# Patient Record
Sex: FEMALE | Race: OTHER | HISPANIC OR LATINO | URBAN - METROPOLITAN AREA
[De-identification: names, ages, dates, MRNs, and addresses within clinical notes are randomized per-mention and may not be internally consistent; named-entity substitution may affect disease eponyms.]

---

## 2017-05-05 ENCOUNTER — OUTPATIENT (OUTPATIENT)
Dept: OUTPATIENT SERVICES | Facility: HOSPITAL | Age: 37
LOS: 1 days | End: 2017-05-05
Payer: SELF-PAY

## 2017-05-05 ENCOUNTER — APPOINTMENT (OUTPATIENT)
Dept: UROLOGY | Facility: CLINIC | Age: 37
End: 2017-05-05

## 2017-05-05 DIAGNOSIS — R35.0 FREQUENCY OF MICTURITION: ICD-10-CM

## 2017-05-05 PROCEDURE — G0463: CPT

## 2017-05-09 DIAGNOSIS — N13.30 UNSPECIFIED HYDRONEPHROSIS: ICD-10-CM

## 2017-06-12 ENCOUNTER — OUTPATIENT (OUTPATIENT)
Dept: OUTPATIENT SERVICES | Facility: HOSPITAL | Age: 37
LOS: 1 days | End: 2017-06-12

## 2017-06-12 VITALS
WEIGHT: 169.98 LBS | HEIGHT: 63 IN | RESPIRATION RATE: 16 BRPM | TEMPERATURE: 97 F | SYSTOLIC BLOOD PRESSURE: 108 MMHG | HEART RATE: 82 BPM | DIASTOLIC BLOOD PRESSURE: 72 MMHG | OXYGEN SATURATION: 99 %

## 2017-06-12 DIAGNOSIS — N13.30 UNSPECIFIED HYDRONEPHROSIS: ICD-10-CM

## 2017-06-12 LAB
APPEARANCE UR: CLEAR — SIGNIFICANT CHANGE UP
BILIRUB UR-MCNC: NEGATIVE — SIGNIFICANT CHANGE UP
BLD GP AB SCN SERPL QL: NEGATIVE — SIGNIFICANT CHANGE UP
BLOOD UR QL VISUAL: NEGATIVE — SIGNIFICANT CHANGE UP
BUN SERPL-MCNC: 14 MG/DL — SIGNIFICANT CHANGE UP (ref 7–23)
CALCIUM SERPL-MCNC: 9 MG/DL — SIGNIFICANT CHANGE UP (ref 8.4–10.5)
CHLORIDE SERPL-SCNC: 104 MMOL/L — SIGNIFICANT CHANGE UP (ref 98–107)
CO2 SERPL-SCNC: 27 MMOL/L — SIGNIFICANT CHANGE UP (ref 22–31)
COLOR SPEC: SIGNIFICANT CHANGE UP
CREAT SERPL-MCNC: 0.61 MG/DL — SIGNIFICANT CHANGE UP (ref 0.5–1.3)
GLUCOSE SERPL-MCNC: 86 MG/DL — SIGNIFICANT CHANGE UP (ref 70–99)
GLUCOSE UR-MCNC: NEGATIVE — SIGNIFICANT CHANGE UP
HCT VFR BLD CALC: 37.9 % — SIGNIFICANT CHANGE UP (ref 34.5–45)
HGB BLD-MCNC: 12 G/DL — SIGNIFICANT CHANGE UP (ref 11.5–15.5)
KETONES UR-MCNC: NEGATIVE — SIGNIFICANT CHANGE UP
LEUKOCYTE ESTERASE UR-ACNC: NEGATIVE — SIGNIFICANT CHANGE UP
MCHC RBC-ENTMCNC: 29.3 PG — SIGNIFICANT CHANGE UP (ref 27–34)
MCHC RBC-ENTMCNC: 31.7 % — LOW (ref 32–36)
MCV RBC AUTO: 92.4 FL — SIGNIFICANT CHANGE UP (ref 80–100)
MUCOUS THREADS # UR AUTO: SIGNIFICANT CHANGE UP
NITRITE UR-MCNC: NEGATIVE — SIGNIFICANT CHANGE UP
PH UR: 6.5 — SIGNIFICANT CHANGE UP (ref 4.6–8)
PLATELET # BLD AUTO: 262 K/UL — SIGNIFICANT CHANGE UP (ref 150–400)
PMV BLD: 9.9 FL — SIGNIFICANT CHANGE UP (ref 7–13)
POTASSIUM SERPL-MCNC: 4.1 MMOL/L — SIGNIFICANT CHANGE UP (ref 3.5–5.3)
POTASSIUM SERPL-SCNC: 4.1 MMOL/L — SIGNIFICANT CHANGE UP (ref 3.5–5.3)
PROT UR-MCNC: NEGATIVE — SIGNIFICANT CHANGE UP
RBC # BLD: 4.1 M/UL — SIGNIFICANT CHANGE UP (ref 3.8–5.2)
RBC # FLD: 13.6 % — SIGNIFICANT CHANGE UP (ref 10.3–14.5)
RBC CASTS # UR COMP ASSIST: SIGNIFICANT CHANGE UP (ref 0–?)
RH IG SCN BLD-IMP: POSITIVE — SIGNIFICANT CHANGE UP
SODIUM SERPL-SCNC: 142 MMOL/L — SIGNIFICANT CHANGE UP (ref 135–145)
SP GR SPEC: 1.01 — SIGNIFICANT CHANGE UP (ref 1–1.03)
SQUAMOUS # UR AUTO: SIGNIFICANT CHANGE UP
UROBILINOGEN FLD QL: NORMAL E.U. — SIGNIFICANT CHANGE UP (ref 0.1–0.2)
WBC # BLD: 5.49 K/UL — SIGNIFICANT CHANGE UP (ref 3.8–10.5)
WBC # FLD AUTO: 5.49 K/UL — SIGNIFICANT CHANGE UP (ref 3.8–10.5)
WBC UR QL: SIGNIFICANT CHANGE UP (ref 0–?)

## 2017-06-12 RX ORDER — IBUPROFEN 200 MG
0 TABLET ORAL
Qty: 0 | Refills: 0 | COMMUNITY

## 2017-06-12 RX ORDER — SODIUM CHLORIDE 9 MG/ML
1000 INJECTION, SOLUTION INTRAVENOUS
Qty: 0 | Refills: 0 | Status: DISCONTINUED | OUTPATIENT
Start: 2017-06-13 | End: 2017-06-13

## 2017-06-12 NOTE — H&P PST ADULT - NSANTHOSAYNRD_GEN_A_CORE
No. SUSI screening performed.  STOP BANG Legend: 0-2 = LOW Risk; 3-4 = INTERMEDIATE Risk; 5-8 = HIGH Risk

## 2017-06-12 NOTE — H&P PST ADULT - HISTORY OF PRESENT ILLNESS
Pt. is a 38 yo female with left flank pain. Pt. went to the ER.  Diagnostic testing revealed left hydronephrosis.

## 2017-06-12 NOTE — H&P PST ADULT - NS NEC GEN PE MLT EXAM PC
Metabolic encephalopathy Type 2 diabetes mellitus without complication, without long-term current use of insulin Type 2 diabetes mellitus without complication, without long-term current use of insulin Type 2 diabetes mellitus without complication, without long-term current use of insulin Type 2 diabetes mellitus without complication, without long-term current use of insulin Metabolic encephalopathy Type 2 diabetes mellitus without complication, without long-term current use of insulin No bruits; no thyromegaly or nodules

## 2017-06-12 NOTE — H&P PST ADULT - REASON FOR ADMISSION
"I am having problems with my left kidney, the reason is obstruction, the tube that connects the kidney to the bladder, there is an obstruction there"

## 2017-06-12 NOTE — H&P PST ADULT - MALLAMPATI CLASS
on phonation/Class I (easy) - visualization of the soft palate, fauces, uvula, and both anterior and posterior pillars

## 2017-06-12 NOTE — ASU PATIENT PROFILE, ADULT - PATIENT KNOW
Called patient to let him know Dr. Saravia would like to repeat the procedure in 8 weeks.    He will be contacted by the schedulers to set up procedure.    Schedule Procedure:     Please Schedule 8-10 weeks  Procedure: EGD (54369) with EMR  Diagnosis: Smith's Esophagus K22.70  Is patient:    Diabetic? No   On Coumadin? No   On ASA/NSAIDS? Yes: aspirin  Prophylactic Antibiotics? No  Location: [facility]  Special Instructions:   MAC Anesthesia    With Dr. Saravia                yes

## 2017-06-13 ENCOUNTER — INPATIENT (INPATIENT)
Facility: HOSPITAL | Age: 37
LOS: 1 days | Discharge: HOME | End: 2017-06-15
Attending: UROLOGY | Admitting: UROLOGY
Payer: SELF-PAY

## 2017-06-13 ENCOUNTER — RESULT REVIEW (OUTPATIENT)
Age: 37
End: 2017-06-13

## 2017-06-13 ENCOUNTER — APPOINTMENT (OUTPATIENT)
Dept: UROLOGY | Facility: HOSPITAL | Age: 37
End: 2017-06-13

## 2017-06-13 VITALS
HEIGHT: 63 IN | SYSTOLIC BLOOD PRESSURE: 110 MMHG | HEART RATE: 69 BPM | RESPIRATION RATE: 16 BRPM | DIASTOLIC BLOOD PRESSURE: 61 MMHG | TEMPERATURE: 98 F | WEIGHT: 169.98 LBS | OXYGEN SATURATION: 100 %

## 2017-06-13 DIAGNOSIS — N13.30 UNSPECIFIED HYDRONEPHROSIS: ICD-10-CM

## 2017-06-13 LAB
HCG UR QL: NEGATIVE — SIGNIFICANT CHANGE UP
RH IG SCN BLD-IMP: POSITIVE — SIGNIFICANT CHANGE UP
SPECIMEN SOURCE: SIGNIFICANT CHANGE UP

## 2017-06-13 PROCEDURE — 88304 TISSUE EXAM BY PATHOLOGIST: CPT | Mod: 26

## 2017-06-13 PROCEDURE — 50544 LAPAROSCOPY PYELOPLASTY: CPT | Mod: LT

## 2017-06-13 RX ORDER — HEPARIN SODIUM 5000 [USP'U]/ML
5000 INJECTION INTRAVENOUS; SUBCUTANEOUS EVERY 8 HOURS
Qty: 0 | Refills: 0 | Status: DISCONTINUED | OUTPATIENT
Start: 2017-06-13 | End: 2017-06-15

## 2017-06-13 RX ORDER — SENNA PLUS 8.6 MG/1
2 TABLET ORAL AT BEDTIME
Qty: 0 | Refills: 0 | Status: DISCONTINUED | OUTPATIENT
Start: 2017-06-13 | End: 2017-06-15

## 2017-06-13 RX ORDER — CEFAZOLIN SODIUM 1 G
1000 VIAL (EA) INJECTION EVERY 8 HOURS
Qty: 0 | Refills: 0 | Status: COMPLETED | OUTPATIENT
Start: 2017-06-13 | End: 2017-06-14

## 2017-06-13 RX ORDER — ACETAMINOPHEN 500 MG
650 TABLET ORAL EVERY 6 HOURS
Qty: 0 | Refills: 0 | Status: DISCONTINUED | OUTPATIENT
Start: 2017-06-13 | End: 2017-06-15

## 2017-06-13 RX ORDER — SODIUM CHLORIDE 9 MG/ML
1000 INJECTION, SOLUTION INTRAVENOUS
Qty: 0 | Refills: 0 | Status: DISCONTINUED | OUTPATIENT
Start: 2017-06-13 | End: 2017-06-14

## 2017-06-13 RX ORDER — HYDROMORPHONE HYDROCHLORIDE 2 MG/ML
1 INJECTION INTRAMUSCULAR; INTRAVENOUS; SUBCUTANEOUS EVERY 4 HOURS
Qty: 0 | Refills: 0 | Status: DISCONTINUED | OUTPATIENT
Start: 2017-06-13 | End: 2017-06-14

## 2017-06-13 RX ORDER — DOCUSATE SODIUM 100 MG
100 CAPSULE ORAL THREE TIMES A DAY
Qty: 0 | Refills: 0 | Status: DISCONTINUED | OUTPATIENT
Start: 2017-06-13 | End: 2017-06-15

## 2017-06-13 RX ADMIN — HEPARIN SODIUM 5000 UNIT(S): 5000 INJECTION INTRAVENOUS; SUBCUTANEOUS at 21:42

## 2017-06-13 RX ADMIN — SODIUM CHLORIDE 125 MILLILITER(S): 9 INJECTION, SOLUTION INTRAVENOUS at 13:53

## 2017-06-13 RX ADMIN — HEPARIN SODIUM 5000 UNIT(S): 5000 INJECTION INTRAVENOUS; SUBCUTANEOUS at 14:00

## 2017-06-13 RX ADMIN — Medication 100 MILLIGRAM(S): at 20:26

## 2017-06-13 RX ADMIN — SENNA PLUS 2 TABLET(S): 8.6 TABLET ORAL at 21:41

## 2017-06-13 RX ADMIN — Medication 100 MILLIGRAM(S): at 21:42

## 2017-06-13 NOTE — PROGRESS NOTE ADULT - SUBJECTIVE AND OBJECTIVE BOX
Post op check    This   38yo F  is s/p  Piero. BRE lap pyrloplasty    PMH:  none  Pt is awake and alert  Has no c/o  Afeb 108/58  73  99%RA    Abd- soft; appropriately tender             wounds C&D  Flores 200  RHONDA 0

## 2017-06-14 ENCOUNTER — TRANSCRIPTION ENCOUNTER (OUTPATIENT)
Age: 37
End: 2017-06-14

## 2017-06-14 LAB
BACTERIA UR CULT: SIGNIFICANT CHANGE UP
BUN SERPL-MCNC: 8 MG/DL — SIGNIFICANT CHANGE UP (ref 7–23)
CALCIUM SERPL-MCNC: 8 MG/DL — LOW (ref 8.4–10.5)
CHLORIDE SERPL-SCNC: 104 MMOL/L — SIGNIFICANT CHANGE UP (ref 98–107)
CO2 SERPL-SCNC: 23 MMOL/L — SIGNIFICANT CHANGE UP (ref 22–31)
CREAT SERPL-MCNC: 0.58 MG/DL — SIGNIFICANT CHANGE UP (ref 0.5–1.3)
GLUCOSE SERPL-MCNC: 91 MG/DL — SIGNIFICANT CHANGE UP (ref 70–99)
HCT VFR BLD CALC: 31.2 % — LOW (ref 34.5–45)
HGB BLD-MCNC: 9.9 G/DL — LOW (ref 11.5–15.5)
MCHC RBC-ENTMCNC: 29.8 PG — SIGNIFICANT CHANGE UP (ref 27–34)
MCHC RBC-ENTMCNC: 31.7 % — LOW (ref 32–36)
MCV RBC AUTO: 94 FL — SIGNIFICANT CHANGE UP (ref 80–100)
PLATELET # BLD AUTO: 189 K/UL — SIGNIFICANT CHANGE UP (ref 150–400)
PMV BLD: 9.7 FL — SIGNIFICANT CHANGE UP (ref 7–13)
POTASSIUM SERPL-MCNC: 3.8 MMOL/L — SIGNIFICANT CHANGE UP (ref 3.5–5.3)
POTASSIUM SERPL-SCNC: 3.8 MMOL/L — SIGNIFICANT CHANGE UP (ref 3.5–5.3)
RBC # BLD: 3.32 M/UL — LOW (ref 3.8–5.2)
RBC # FLD: 14.1 % — SIGNIFICANT CHANGE UP (ref 10.3–14.5)
SODIUM SERPL-SCNC: 139 MMOL/L — SIGNIFICANT CHANGE UP (ref 135–145)
WBC # BLD: 6.38 K/UL — SIGNIFICANT CHANGE UP (ref 3.8–10.5)
WBC # FLD AUTO: 6.38 K/UL — SIGNIFICANT CHANGE UP (ref 3.8–10.5)

## 2017-06-14 RX ORDER — SODIUM CHLORIDE 9 MG/ML
1000 INJECTION, SOLUTION INTRAVENOUS
Qty: 0 | Refills: 0 | Status: DISCONTINUED | OUTPATIENT
Start: 2017-06-14 | End: 2017-06-15

## 2017-06-14 RX ORDER — HYDROMORPHONE HYDROCHLORIDE 2 MG/ML
0.5 INJECTION INTRAMUSCULAR; INTRAVENOUS; SUBCUTANEOUS ONCE
Qty: 0 | Refills: 0 | Status: DISCONTINUED | OUTPATIENT
Start: 2017-06-14 | End: 2017-06-14

## 2017-06-14 RX ORDER — HYDROMORPHONE HYDROCHLORIDE 2 MG/ML
1 INJECTION INTRAMUSCULAR; INTRAVENOUS; SUBCUTANEOUS EVERY 4 HOURS
Qty: 0 | Refills: 0 | Status: DISCONTINUED | OUTPATIENT
Start: 2017-06-14 | End: 2017-06-15

## 2017-06-14 RX ORDER — HYDROMORPHONE HYDROCHLORIDE 2 MG/ML
0.5 INJECTION INTRAMUSCULAR; INTRAVENOUS; SUBCUTANEOUS
Qty: 0 | Refills: 0 | Status: DISCONTINUED | OUTPATIENT
Start: 2017-06-14 | End: 2017-06-15

## 2017-06-14 RX ADMIN — Medication 100 MILLIGRAM(S): at 14:46

## 2017-06-14 RX ADMIN — SENNA PLUS 2 TABLET(S): 8.6 TABLET ORAL at 21:46

## 2017-06-14 RX ADMIN — HYDROMORPHONE HYDROCHLORIDE 1 MILLIGRAM(S): 2 INJECTION INTRAMUSCULAR; INTRAVENOUS; SUBCUTANEOUS at 15:44

## 2017-06-14 RX ADMIN — Medication 100 MILLIGRAM(S): at 05:35

## 2017-06-14 RX ADMIN — HYDROMORPHONE HYDROCHLORIDE 1 MILLIGRAM(S): 2 INJECTION INTRAMUSCULAR; INTRAVENOUS; SUBCUTANEOUS at 20:46

## 2017-06-14 RX ADMIN — HEPARIN SODIUM 5000 UNIT(S): 5000 INJECTION INTRAVENOUS; SUBCUTANEOUS at 05:35

## 2017-06-14 RX ADMIN — HYDROMORPHONE HYDROCHLORIDE 0.5 MILLIGRAM(S): 2 INJECTION INTRAMUSCULAR; INTRAVENOUS; SUBCUTANEOUS at 10:57

## 2017-06-14 RX ADMIN — HEPARIN SODIUM 5000 UNIT(S): 5000 INJECTION INTRAVENOUS; SUBCUTANEOUS at 21:46

## 2017-06-14 RX ADMIN — Medication 100 MILLIGRAM(S): at 03:53

## 2017-06-14 RX ADMIN — Medication 10 MILLIGRAM(S): at 21:47

## 2017-06-14 RX ADMIN — HYDROMORPHONE HYDROCHLORIDE 1 MILLIGRAM(S): 2 INJECTION INTRAMUSCULAR; INTRAVENOUS; SUBCUTANEOUS at 20:31

## 2017-06-14 RX ADMIN — HEPARIN SODIUM 5000 UNIT(S): 5000 INJECTION INTRAVENOUS; SUBCUTANEOUS at 14:46

## 2017-06-14 RX ADMIN — Medication 10 MILLIGRAM(S): at 14:46

## 2017-06-14 RX ADMIN — HYDROMORPHONE HYDROCHLORIDE 0.5 MILLIGRAM(S): 2 INJECTION INTRAMUSCULAR; INTRAVENOUS; SUBCUTANEOUS at 10:42

## 2017-06-14 RX ADMIN — HYDROMORPHONE HYDROCHLORIDE 1 MILLIGRAM(S): 2 INJECTION INTRAMUSCULAR; INTRAVENOUS; SUBCUTANEOUS at 15:59

## 2017-06-14 RX ADMIN — Medication 100 MILLIGRAM(S): at 12:00

## 2017-06-14 RX ADMIN — Medication 100 MILLIGRAM(S): at 21:46

## 2017-06-14 NOTE — PROGRESS NOTE ADULT - SUBJECTIVE AND OBJECTIVE BOX
POD #1    Afeb 96/54  76 100%RA    Pt has no c/o    Abd- soft NT ND; no flatus          wounds C&D    Mark 1400  RHONDA 60

## 2017-06-14 NOTE — PROGRESS NOTE ADULT - SUBJECTIVE AND OBJECTIVE BOX
ANESTHESIA POSTOP CHECK    37y Female POSTOP DAY 1 S/P cystoscopy    Vital Signs Last 24 Hrs  T(C): 37.4, Max: 37.4 (06-14 @ 10:36)  T(F): 99.3, Max: 99.3 (06-14 @ 10:36)  HR: 78 (70 - 88)  BP: 104/60 (92/50 - 115/66)  BP(mean): --  RR: 18 (16 - 23)  SpO2: 99% (97% - 100%)  I&O's Summary  I & Os for 24h ending 14 Jun 2017 07:00  =============================================  IN: 425 ml / OUT: 2424 ml / NET: -1999 ml    I & Os for current day (as of 14 Jun 2017 11:50)  =============================================  IN: 0 ml / OUT: 610 ml / NET: -610 ml      [x ] NO APPARENT ANESTHESIA COMPLICATIONS      Comments:

## 2017-06-15 VITALS
HEART RATE: 85 BPM | RESPIRATION RATE: 16 BRPM | OXYGEN SATURATION: 99 % | DIASTOLIC BLOOD PRESSURE: 71 MMHG | SYSTOLIC BLOOD PRESSURE: 112 MMHG | TEMPERATURE: 99 F

## 2017-06-15 LAB
BUN SERPL-MCNC: 5 MG/DL — LOW (ref 7–23)
CALCIUM SERPL-MCNC: 8.4 MG/DL — SIGNIFICANT CHANGE UP (ref 8.4–10.5)
CHLORIDE SERPL-SCNC: 105 MMOL/L — SIGNIFICANT CHANGE UP (ref 98–107)
CO2 SERPL-SCNC: 26 MMOL/L — SIGNIFICANT CHANGE UP (ref 22–31)
CREAT SERPL-MCNC: 0.6 MG/DL — SIGNIFICANT CHANGE UP (ref 0.5–1.3)
GLUCOSE SERPL-MCNC: 111 MG/DL — HIGH (ref 70–99)
HCT VFR BLD CALC: 33.3 % — LOW (ref 34.5–45)
HGB BLD-MCNC: 10.6 G/DL — LOW (ref 11.5–15.5)
MCHC RBC-ENTMCNC: 29.8 PG — SIGNIFICANT CHANGE UP (ref 27–34)
MCHC RBC-ENTMCNC: 31.8 % — LOW (ref 32–36)
MCV RBC AUTO: 93.5 FL — SIGNIFICANT CHANGE UP (ref 80–100)
PLATELET # BLD AUTO: 203 K/UL — SIGNIFICANT CHANGE UP (ref 150–400)
PMV BLD: 9.5 FL — SIGNIFICANT CHANGE UP (ref 7–13)
POTASSIUM SERPL-MCNC: 3.5 MMOL/L — SIGNIFICANT CHANGE UP (ref 3.5–5.3)
POTASSIUM SERPL-SCNC: 3.5 MMOL/L — SIGNIFICANT CHANGE UP (ref 3.5–5.3)
RBC # BLD: 3.56 M/UL — LOW (ref 3.8–5.2)
RBC # FLD: 13.9 % — SIGNIFICANT CHANGE UP (ref 10.3–14.5)
SODIUM SERPL-SCNC: 142 MMOL/L — SIGNIFICANT CHANGE UP (ref 135–145)
WBC # BLD: 7.12 K/UL — SIGNIFICANT CHANGE UP (ref 3.8–10.5)
WBC # FLD AUTO: 7.12 K/UL — SIGNIFICANT CHANGE UP (ref 3.8–10.5)

## 2017-06-15 RX ORDER — HYDROMORPHONE HYDROCHLORIDE 2 MG/ML
4 INJECTION INTRAMUSCULAR; INTRAVENOUS; SUBCUTANEOUS EVERY 4 HOURS
Qty: 0 | Refills: 0 | Status: DISCONTINUED | OUTPATIENT
Start: 2017-06-15 | End: 2017-06-15

## 2017-06-15 RX ORDER — HYDROMORPHONE HYDROCHLORIDE 2 MG/ML
1 INJECTION INTRAMUSCULAR; INTRAVENOUS; SUBCUTANEOUS
Qty: 20 | Refills: 0 | OUTPATIENT
Start: 2017-06-15

## 2017-06-15 RX ORDER — SENNA PLUS 8.6 MG/1
2 TABLET ORAL
Qty: 0 | Refills: 0 | COMMUNITY
Start: 2017-06-15

## 2017-06-15 RX ORDER — POTASSIUM CHLORIDE 20 MEQ
20 PACKET (EA) ORAL ONCE
Qty: 0 | Refills: 0 | Status: COMPLETED | OUTPATIENT
Start: 2017-06-15 | End: 2017-06-15

## 2017-06-15 RX ORDER — IBUPROFEN 200 MG
2 TABLET ORAL
Qty: 0 | Refills: 0 | COMMUNITY

## 2017-06-15 RX ORDER — DOCUSATE SODIUM 100 MG
1 CAPSULE ORAL
Qty: 0 | Refills: 0 | COMMUNITY
Start: 2017-06-15

## 2017-06-15 RX ORDER — HYDROMORPHONE HYDROCHLORIDE 2 MG/ML
2 INJECTION INTRAMUSCULAR; INTRAVENOUS; SUBCUTANEOUS EVERY 4 HOURS
Qty: 0 | Refills: 0 | Status: DISCONTINUED | OUTPATIENT
Start: 2017-06-15 | End: 2017-06-15

## 2017-06-15 RX ORDER — ACETAMINOPHEN 500 MG
2 TABLET ORAL
Qty: 0 | Refills: 0 | COMMUNITY
Start: 2017-06-15

## 2017-06-15 RX ADMIN — HYDROMORPHONE HYDROCHLORIDE 4 MILLIGRAM(S): 2 INJECTION INTRAMUSCULAR; INTRAVENOUS; SUBCUTANEOUS at 11:21

## 2017-06-15 RX ADMIN — SODIUM CHLORIDE 75 MILLILITER(S): 9 INJECTION, SOLUTION INTRAVENOUS at 06:04

## 2017-06-15 RX ADMIN — Medication 100 MILLIGRAM(S): at 06:04

## 2017-06-15 RX ADMIN — Medication 20 MILLIEQUIVALENT(S): at 09:08

## 2017-06-15 RX ADMIN — HEPARIN SODIUM 5000 UNIT(S): 5000 INJECTION INTRAVENOUS; SUBCUTANEOUS at 06:04

## 2017-06-15 RX ADMIN — HYDROMORPHONE HYDROCHLORIDE 4 MILLIGRAM(S): 2 INJECTION INTRAMUSCULAR; INTRAVENOUS; SUBCUTANEOUS at 11:50

## 2017-06-15 RX ADMIN — HYDROMORPHONE HYDROCHLORIDE 1 MILLIGRAM(S): 2 INJECTION INTRAMUSCULAR; INTRAVENOUS; SUBCUTANEOUS at 06:25

## 2017-06-15 RX ADMIN — HYDROMORPHONE HYDROCHLORIDE 1 MILLIGRAM(S): 2 INJECTION INTRAMUSCULAR; INTRAVENOUS; SUBCUTANEOUS at 06:09

## 2017-06-15 NOTE — PROGRESS NOTE ADULT - ATTENDING COMMENTS
Agree with note as written above. Patient currently up to chair. Complaining of some abdominal pain but is controlled somewhat with pain meds. Denies nausea or vomiting. No flatus    Vital Signs Last 24 Hrs  T(C): 37, Max: 37.4 (06-14 @ 10:36)  T(F): 98.6, Max: 99.3 (06-14 @ 10:36)  HR: 84 (71 - 86)  BP: 117/75 (92/50 - 117/75)  BP(mean): --  RR: 20 (16 - 20)  SpO2: 98% (98% - 100%)    Abd: soft, incisions c/d/i, RHONDA draining serosanguinous  : collazo draining clear yellow urine                          9.9    6.38  )-----------( 189      ( 14 Jun 2017 06:30 )             31.2   06-14    139  |  104  |  8   ----------------------------<  91  3.8   |  23  |  0.58    Ca    8.0<L>      14 Jun 2017 06:30    A/P 36 yo F s/p RObotic left pyeloplasty POD#1    - pain control  - ambulate  - AM labs tomorrow  - pt requesting to speak with socal work regarding billing issues  - TOV tomorrow AM
Agree with note as written above. Patient currently resting comfortably. No complaints.    A/P 36 yo F s/p left robotic pyeloplasty POD#2    - TOV today and monitor RHONDA output  - discharge planning

## 2017-06-15 NOTE — DISCHARGE NOTE ADULT - MEDICATION SUMMARY - MEDICATIONS TO TAKE
I will START or STAY ON the medications listed below when I get home from the hospital:    acetaminophen 325 mg oral tablet  -- 2 tab(s) by mouth every 6 hours, As needed, Mild Pain (1 - 3)  -- Indication: For Pain    HYDROmorphone 2 mg oral tablet  -- 1 to 2 tab(s) by mouth every 4 to 6 hours, As needed, For Pain MDD:6  -- Indication: For Pain    senna oral tablet  -- 2 tab(s) by mouth once a day (at bedtime)  -- Indication: For Constipation    docusate sodium 100 mg oral capsule  -- 1 cap(s) by mouth 3 times a day  -- Indication: For Constipation

## 2017-06-15 NOTE — DISCHARGE NOTE ADULT - CARE PLAN
Principal Discharge DX:	Hydronephrosis, left  Goal:	Pain control  Instructions for follow-up, activity and diet:	Drink plenty of fluids.  No heavy lifting (greater than 10 pounds) or straining for 4 to 6 weeks.  You may shower, just pat white strips dry, they will fall off in 10 to 14 days.  Do not drive when taking pain medication.  Call the urology clinic to schedule a follow up appointment in 3 weeks.  Call the clinic if you have fever greater than 101, difficulty urinating, pain not relieved with pain medication, nausea/vomiting. Principal Discharge DX:	Hydronephrosis, left  Goal:	Pain control  Instructions for follow-up, activity and diet:	Drink plenty of fluids.  No heavy lifting (greater than 10 pounds) or straining for 4 to 6 weeks.  You may shower, just pat white strips dry, they will fall off in 10 to 14 days.  Do not drive when taking pain medication.  You may have intermittent pink tinged urine and slight flank pain when you urinate.  This is normal and due to the stent in your ureter.   If your urine becomes bright red or with clots, please call the office.  Call the urology clinic to schedule a follow up appointment in 3 weeks for further management and stent removal.  Call the clinic if you have fever greater than 101, difficulty urinating, pain not relieved with pain medication, nausea/vomiting.

## 2017-06-15 NOTE — DISCHARGE NOTE ADULT - MEDICATION SUMMARY - MEDICATIONS TO STOP TAKING
I will STOP taking the medications listed below when I get home from the hospital:    Advil 200 mg oral tablet  -- 2  tabs by mouth , As Needed

## 2017-06-15 NOTE — PROGRESS NOTE ADULT - PROBLEM SELECTOR PLAN 1
check AM labs  Check urine outputs  OOB  awaiting GI
D/c Flores, monitor RHONDA output once voids, if remains low, d/c RHONDA  Advance diet, monitor GI function  OOB, ambulate  DVT prophy  Discharge planning.
check AM labs  Check urine outputs  OOB  awaiting GI

## 2017-06-15 NOTE — DISCHARGE NOTE ADULT - PLAN OF CARE
Pain control Drink plenty of fluids.  No heavy lifting (greater than 10 pounds) or straining for 4 to 6 weeks.  You may shower, just pat white strips dry, they will fall off in 10 to 14 days.  Do not drive when taking pain medication.  Call the urology clinic to schedule a follow up appointment in 3 weeks.  Call the clinic if you have fever greater than 101, difficulty urinating, pain not relieved with pain medication, nausea/vomiting. Drink plenty of fluids.  No heavy lifting (greater than 10 pounds) or straining for 4 to 6 weeks.  You may shower, just pat white strips dry, they will fall off in 10 to 14 days.  Do not drive when taking pain medication.  You may have intermittent pink tinged urine and slight flank pain when you urinate.  This is normal and due to the stent in your ureter.   If your urine becomes bright red or with clots, please call the office.  Call the urology clinic to schedule a follow up appointment in 3 weeks for further management and stent removal.  Call the clinic if you have fever greater than 101, difficulty urinating, pain not relieved with pain medication, nausea/vomiting.

## 2017-06-15 NOTE — DISCHARGE NOTE ADULT - HOSPITAL COURSE
38 yo F underwent uncomplicated robot assisted lap left pyeloplasty on 6/13/17.  Postoperative course uneventful, pain controlled, ambulating.  Successful TOV on POD #2, RHONDA then removed. Return of GI function on POD #2, diet advanced without incident.   Pt d/c on POD #2 to f/u with Urology clinic.  I-stop checked. 38 yo F underwent uncomplicated robot assisted lap left pyeloplasty on 6/13/17.  Postoperative course uneventful, pain controlled, ambulating.  Successful TOV on POD #2, RHONDA output minimal, RHONDA removed. Return of GI function on POD #2, diet advanced without incident.   Pt d/c on POD #2 to f/u with Urology clinic.  I-stop checked.

## 2017-06-15 NOTE — DISCHARGE NOTE ADULT - PATIENT PORTAL LINK FT
“You can access the [employer] Patient Portal, offered by [facility], by registering with the following website: [email or web address]”

## 2017-06-15 NOTE — PROGRESS NOTE ADULT - SUBJECTIVE AND OBJECTIVE BOX
Overnight events: No events      Subjective:  Pt passed flatus, small BM, no N/V, tolerating CLD      Objective:    Vital signs  T(C): , Max: 37.4 (06-14 @ 10:36)  HR: 72  BP: 116/62  SpO2: 99%  Wt(kg): --    Output     I & Os for current day (as of 06-15 @ 07:10)  =============================================  IN: 0 ml / OUT: 3764.5 ml / NET: -3764.5 ml    Flores: 2125 yellow  RHONDA: 14.5 serous    Gen: NAD  Abd: Incisions C/D/I, soft, appropriately tender, RHONDA dressing changed      Labs                        10.6   7.12  )-----------( 203      ( 15 Carlos 2017 05:48 )             33.3     15 Carlos 2017 05:48    142    |  105    |  5      ----------------------------<  111    3.5     |  26     |  0.60     Ca    8.4        15 Carlos 2017 05:48

## 2017-06-15 NOTE — DISCHARGE NOTE ADULT - INSTRUCTIONS
Drink plenty of fluids Call MD for any complain of difficulty urinating, bloody urine, fever and a return appointment.

## 2017-06-15 NOTE — DISCHARGE NOTE ADULT - PROVIDER TOKENS
FREE:[LAST:[Urology Clinic],PHONE:[phone]],FAX:[(   [phone]    -],ADDRESS:[[address] [place] [address]]]

## 2017-06-15 NOTE — DISCHARGE NOTE ADULT - NS AS ACTIVITY OBS
Walking-Indoors allowed/Walking-Outdoors allowed/No Heavy lifting/straining/Showering allowed/Stairs allowed

## 2017-06-15 NOTE — DISCHARGE NOTE ADULT - CONDITIONS AT DISCHARGE
+void, Tolerated po and fluids.   Left abdominal RHONDA insertion sited dressing clean, dry and intact

## 2017-06-16 ENCOUNTER — APPOINTMENT (OUTPATIENT)
Dept: UROLOGY | Facility: CLINIC | Age: 37
End: 2017-06-16

## 2017-07-07 ENCOUNTER — APPOINTMENT (OUTPATIENT)
Dept: UROLOGY | Facility: CLINIC | Age: 37
End: 2017-07-07

## 2017-07-28 ENCOUNTER — APPOINTMENT (OUTPATIENT)
Dept: UROLOGY | Facility: CLINIC | Age: 37
End: 2017-07-28

## 2017-07-28 ENCOUNTER — OUTPATIENT (OUTPATIENT)
Dept: OUTPATIENT SERVICES | Facility: HOSPITAL | Age: 37
LOS: 1 days | End: 2017-07-28
Payer: SELF-PAY

## 2017-07-28 VITALS
RESPIRATION RATE: 16 BRPM | BODY MASS INDEX: 29.02 KG/M2 | WEIGHT: 170 LBS | SYSTOLIC BLOOD PRESSURE: 107 MMHG | HEART RATE: 73 BPM | DIASTOLIC BLOOD PRESSURE: 74 MMHG | HEIGHT: 64 IN

## 2017-07-28 DIAGNOSIS — R35.0 FREQUENCY OF MICTURITION: ICD-10-CM

## 2017-07-28 PROCEDURE — 52310 CYSTOSCOPY AND TREATMENT: CPT

## 2017-08-01 DIAGNOSIS — N13.5 CROSSING VESSEL AND STRICTURE OF URETER WITHOUT HYDRONEPHROSIS: ICD-10-CM

## 2017-09-20 NOTE — H&P PST ADULT - PAIN SCALE PREFERRED, PROFILE
CC:  Mariela Cobb MD *

 

HISTORY AND PHYSICAL:

 

DATE OF ADMISSION:  17

 

PRIMARY CARE PROVIDER:  Mariela Cobb MD

 

CHIEF COMPLAINT:  Chest pain.

 

HISTORY OF PRESENT ILLNESS:  Ms. Javier is an 87-year-old female with history 
of COPD and recent diagnosis of pneumonia for which she was admitted to the 
hospital for 2 days and discharged on 17 with a course of prednisone and 
Levaquin who presented to the hospital today complaining of chest pain.  The 
patient stated that at approximately 9 p.m., when she was getting ready to bed, 
she started experiencing upper chest discomfort, nonradiating, localized at her 
upper chest. She lay on bed and she continued to feel the chest discomfort for 
approximately 15 to 20 minutes.  The chest pain resolved spontaneously.  There 
were no associating factors with it and there was no shortness of breath or 
cough.  The pain was not pleuritic and did not change with movement.  Once again
, it resolved spontaneously after 15 to 20 minutes.  The patient came to the ED 
for evaluation where her initial workup was unremarkable.  Troponin was 
negative and EKG showed no active changes.  Dr. Myrick requested for the 
patient to be observed on telemetry monitoring bed with stress test in the 
morning.

 

PAST MEDICAL HISTORY:

1.  Recent diagnosis of pneumonia.  The patient is still on Levaquin and 
prednisone.

2.  History of COPD with history of COPD exacerbation, continued on prednisone.

3.  Status post cataract surgery bilaterally.

4.  History of cholecystectomy.

5.  History of appendectomy.

6.  History of hysterectomy.

 

MEDICATIONS:  Are unchanged from the discharge 2 days ago and include:

1.  Prednisone 30 mg daily.

2.  Guaifenesin 1200 mg b.i.d.

3.  Ambien 10 mg at bedtime.

4.  Spiriva 1 inhalation daily.

5.  Omeprazole 20 mg daily.

6.  Levofloxacin 750 mg daily.

7.  Albuterol inhaler 2 puffs on a p.r.n. basis.

8.  Xanax 0.25 mg every 8 hours p.r.n.

 

SOCIAL HISTORY:  The patient smokes 1 pack per day and she started when she was 
a teenager.  She denies any alcohol or recreational drug use.  She is  
and lives with her son, who is her surrogate.

 

FAMILY HISTORY:  Positive for mother with history of Alzheimer's,  at the 
age of 87.  Father  secondary to alcoholism in his 70's.

 

REVIEW OF SYSTEMS:  Please see history of present illness.  The patient stated 
that her breathing has markedly improved and she coughed with phlegm and she 
uses incentive spirometry.  She continues to smoke.

 

She stated that she is in a stressful situation at home with her family.

 

She had been ambulating without any problems and no assistance.

 

Other remaining 14 systems reviewed with the patient and were otherwise 
negative.

 

                               PHYSICAL EXAMINATION

 

VITAL SIGNS:  Blood pressure of 117/59, heart rate of 56 and regular, 
respiratory rate of 21, oxygen saturation 92% on room air, temperature 98.0.

 

GENERAL:  The patient is a very pleasant 87-year-old female, who is in no acute 
distress.  Alert, awake, and oriented x3.

 

HEENT:  Head atraumatic, normocephalic.  Eyes: Pupils are equal and reactive to 
light and accommodation.  Oropharynx is clear. Mucosa moist.

 

NECK:  Supple, no JVD, no bruits bilaterally.

 

RESPIRATORY:  Coarse breath sounds at bilateral bases, otherwise clear.

 

CARDIOVASCULAR:  Regular rate and rhythm.  No murmurs.

 

ABDOMEN:  Soft, nontender.  Bowel sounds present in all 4 quadrants.

 

EXTREMITIES:  There is trace bilateral pedal edema.  Pulses are +2 bilaterally. 
There is no clubbing or cyanosis.

 

SKIN:  On evaluation of the skin, no rashes noted.

 

NEURO EVALUATION:  Speech is clear.  Cranial nerves II through XII are grossly 
intact.  Motor strength is 5/5 bilaterally.

 

PSYCHIATRIC EVALUATION:  Pleasant, cooperative with evaluation, oriented x3, 
but sometimes forgetful with no evidence of anxiety or depression.

 

 DIAGNOSTIC STUDIES/LAB DATA:  Laboratory data showed white blood cell count of 
12.7, hemoglobin of 12.5, hematocrit of 37, and platelets of 343.

 

Sodium of 131, potassium 3.3, chloride 101, carbon dioxide 26, BUN 25, 
creatinine 0.9.  Liver function tests were unremarkable.  C-reactive protein of 
8.5.  Troponin of 0.  TSH of 0.4.

 

Urinalysis grossly unremarkable.

 

CT angiogram of the chest showed no evidence of PE and resolving right lung 
pneumonia.

 

The patient's EKG showed normal sinus rhythm with a heart rate of 72 beats per 
minute with no ST changes.

 

ASSESSMENT AND PLAN:  1.  In regards to patient's chest pain, so far the patient
's workup had been unremarkable.  At this point, the patient is going to be 
observed on telemetry monitoring bed with serial troponins, which if they 
continue to negative, she is going to undergo a treadmill stress test in the 
morning.

2.  In regards to patient's chronic obstructive pulmonary disease, the patient 
does not appear in exacerbation.  She is going to be continued on prednisone 
that prednisone taper has been prescribed on discharge.  We will also continue 
her inhalers.

3.  In regards to patient's pneumonia, Levaquin is going to be continued as 
prescribed in discharge.

4.  In regards to DVT prophylaxis, the patient is going to be placed on heparin 
subcutaneously.

5.  The patient's code status is full and her surrogate is her son. TIME SPENT:
  Approximately 65 minutes was spent on admission of this patient.  More than 
half of that time was spent face-to-face with the patient during the interview 
and physical exam.

 

[id number]/[facility] #: [id number]

MYCHAL numerical [id number]

## 2017-10-04 ENCOUNTER — APPOINTMENT (OUTPATIENT)
Dept: ULTRASOUND IMAGING | Facility: CLINIC | Age: 37
End: 2017-10-04

## 2017-10-04 ENCOUNTER — OUTPATIENT (OUTPATIENT)
Dept: OUTPATIENT SERVICES | Facility: HOSPITAL | Age: 37
LOS: 1 days | End: 2017-10-04
Payer: COMMERCIAL

## 2017-10-04 DIAGNOSIS — N13.5 CROSSING VESSEL AND STRICTURE OF URETER WITHOUT HYDRONEPHROSIS: ICD-10-CM

## 2017-10-04 PROCEDURE — 76770 US EXAM ABDO BACK WALL COMP: CPT | Mod: 26

## 2017-10-04 PROCEDURE — 76770 US EXAM ABDO BACK WALL COMP: CPT

## 2017-10-27 ENCOUNTER — OUTPATIENT (OUTPATIENT)
Dept: OUTPATIENT SERVICES | Facility: HOSPITAL | Age: 37
LOS: 1 days | End: 2017-10-27
Payer: SELF-PAY

## 2017-10-27 ENCOUNTER — APPOINTMENT (OUTPATIENT)
Dept: UROLOGY | Facility: CLINIC | Age: 37
End: 2017-10-27

## 2017-10-27 DIAGNOSIS — R35.0 FREQUENCY OF MICTURITION: ICD-10-CM

## 2017-10-27 PROCEDURE — G0463: CPT

## 2017-11-06 DIAGNOSIS — N13.30 UNSPECIFIED HYDRONEPHROSIS: ICD-10-CM

## 2018-01-10 ENCOUNTER — OUTPATIENT (OUTPATIENT)
Dept: OUTPATIENT SERVICES | Facility: HOSPITAL | Age: 38
LOS: 1 days | End: 2018-01-10

## 2018-01-10 ENCOUNTER — APPOINTMENT (OUTPATIENT)
Dept: NUCLEAR MEDICINE | Facility: HOSPITAL | Age: 38
End: 2018-01-10
Payer: COMMERCIAL

## 2018-01-10 DIAGNOSIS — N13.5 CROSSING VESSEL AND STRICTURE OF URETER WITHOUT HYDRONEPHROSIS: ICD-10-CM

## 2018-01-10 PROCEDURE — 78708 K FLOW/FUNCT IMAGE W/DRUG: CPT | Mod: 26

## 2018-01-10 PROCEDURE — 51702 INSERT TEMP BLADDER CATH: CPT

## 2018-01-26 ENCOUNTER — OUTPATIENT (OUTPATIENT)
Dept: OUTPATIENT SERVICES | Facility: HOSPITAL | Age: 38
LOS: 1 days | End: 2018-01-26
Payer: SELF-PAY

## 2018-01-26 ENCOUNTER — APPOINTMENT (OUTPATIENT)
Dept: UROLOGY | Facility: CLINIC | Age: 38
End: 2018-01-26

## 2018-01-26 DIAGNOSIS — R35.0 FREQUENCY OF MICTURITION: ICD-10-CM

## 2018-01-26 PROCEDURE — G0463: CPT

## 2018-01-30 DIAGNOSIS — N13.5 CROSSING VESSEL AND STRICTURE OF URETER WITHOUT HYDRONEPHROSIS: ICD-10-CM

## 2018-02-15 ENCOUNTER — MESSAGE (OUTPATIENT)
Age: 38
End: 2018-02-15

## 2018-02-16 ENCOUNTER — OUTPATIENT (OUTPATIENT)
Dept: OUTPATIENT SERVICES | Facility: HOSPITAL | Age: 38
LOS: 1 days | End: 2018-02-16

## 2018-02-16 VITALS
HEART RATE: 60 BPM | TEMPERATURE: 98 F | RESPIRATION RATE: 14 BRPM | SYSTOLIC BLOOD PRESSURE: 110 MMHG | HEIGHT: 63 IN | DIASTOLIC BLOOD PRESSURE: 66 MMHG | WEIGHT: 173.94 LBS

## 2018-02-16 DIAGNOSIS — N13.30 UNSPECIFIED HYDRONEPHROSIS: Chronic | ICD-10-CM

## 2018-02-16 DIAGNOSIS — N13.5 CROSSING VESSEL AND STRICTURE OF URETER WITHOUT HYDRONEPHROSIS: ICD-10-CM

## 2018-02-16 LAB
APPEARANCE UR: CLEAR — SIGNIFICANT CHANGE UP
BILIRUB UR-MCNC: NEGATIVE — SIGNIFICANT CHANGE UP
BLOOD UR QL VISUAL: NEGATIVE — SIGNIFICANT CHANGE UP
BUN SERPL-MCNC: 14 MG/DL — SIGNIFICANT CHANGE UP (ref 7–23)
CALCIUM SERPL-MCNC: 8.7 MG/DL — SIGNIFICANT CHANGE UP (ref 8.4–10.5)
CHLORIDE SERPL-SCNC: 104 MMOL/L — SIGNIFICANT CHANGE UP (ref 98–107)
CO2 SERPL-SCNC: 26 MMOL/L — SIGNIFICANT CHANGE UP (ref 22–31)
COLOR SPEC: SIGNIFICANT CHANGE UP
CREAT SERPL-MCNC: 0.72 MG/DL — SIGNIFICANT CHANGE UP (ref 0.5–1.3)
GLUCOSE SERPL-MCNC: 78 MG/DL — SIGNIFICANT CHANGE UP (ref 70–99)
GLUCOSE UR-MCNC: NEGATIVE — SIGNIFICANT CHANGE UP
HCT VFR BLD CALC: 37.5 % — SIGNIFICANT CHANGE UP (ref 34.5–45)
HGB BLD-MCNC: 11.9 G/DL — SIGNIFICANT CHANGE UP (ref 11.5–15.5)
KETONES UR-MCNC: NEGATIVE — SIGNIFICANT CHANGE UP
LEUKOCYTE ESTERASE UR-ACNC: NEGATIVE — SIGNIFICANT CHANGE UP
MCHC RBC-ENTMCNC: 28.5 PG — SIGNIFICANT CHANGE UP (ref 27–34)
MCHC RBC-ENTMCNC: 31.7 % — LOW (ref 32–36)
MCV RBC AUTO: 89.7 FL — SIGNIFICANT CHANGE UP (ref 80–100)
NITRITE UR-MCNC: NEGATIVE — SIGNIFICANT CHANGE UP
NON-SQ EPI CELLS # UR AUTO: <1 — SIGNIFICANT CHANGE UP
NRBC # FLD: 0 — SIGNIFICANT CHANGE UP
PH UR: 6.5 — SIGNIFICANT CHANGE UP (ref 4.6–8)
PLATELET # BLD AUTO: 269 K/UL — SIGNIFICANT CHANGE UP (ref 150–400)
PMV BLD: 9.9 FL — SIGNIFICANT CHANGE UP (ref 7–13)
POTASSIUM SERPL-MCNC: 4.4 MMOL/L — SIGNIFICANT CHANGE UP (ref 3.5–5.3)
POTASSIUM SERPL-SCNC: 4.4 MMOL/L — SIGNIFICANT CHANGE UP (ref 3.5–5.3)
PROT UR-MCNC: NEGATIVE MG/DL — SIGNIFICANT CHANGE UP
RBC # BLD: 4.18 M/UL — SIGNIFICANT CHANGE UP (ref 3.8–5.2)
RBC # FLD: 13.1 % — SIGNIFICANT CHANGE UP (ref 10.3–14.5)
RBC CASTS # UR COMP ASSIST: SIGNIFICANT CHANGE UP (ref 0–?)
SODIUM SERPL-SCNC: 142 MMOL/L — SIGNIFICANT CHANGE UP (ref 135–145)
SP GR SPEC: 1.01 — SIGNIFICANT CHANGE UP (ref 1–1.04)
SQUAMOUS # UR AUTO: SIGNIFICANT CHANGE UP
UROBILINOGEN FLD QL: NORMAL MG/DL — SIGNIFICANT CHANGE UP
WBC # BLD: 5.86 K/UL — SIGNIFICANT CHANGE UP (ref 3.8–10.5)
WBC # FLD AUTO: 5.86 K/UL — SIGNIFICANT CHANGE UP (ref 3.8–10.5)
WBC UR QL: SIGNIFICANT CHANGE UP (ref 0–?)

## 2018-02-16 NOTE — H&P PST ADULT - NEGATIVE ENMT SYMPTOMS
no tinnitus/no vertigo/no sinus symptoms/no hearing difficulty/no ear pain/no throat pain/no dysphagia

## 2018-02-16 NOTE — H&P PST ADULT - MALLAMPATI CLASS
on phonation/Class I (easy) - visualization of the soft palate, fauces, uvula, and both anterior and posterior pillars Class II - visualization of the soft palate, fauces, and uvula

## 2018-02-16 NOTE — H&P PST ADULT - PROBLEM SELECTOR PLAN 1
Cystoscopy, Left Retrograde Pyelogram, Left Ureteroscopy, Possible Left Endopyelotomy scheduled on 2/20/18.  Pre-op instructions provided. Pt verbalized understanding.   Pepcid provided for GI prophylaxis.

## 2018-02-16 NOTE — H&P PST ADULT - MUSCULOSKELETAL
details… detailed exam no calf tenderness/ROM intact/no joint swelling/no joint erythema/normal strength/no joint warmth

## 2018-02-16 NOTE — H&P PST ADULT - HISTORY OF PRESENT ILLNESS
37 year old female with h/o left hydronephrosis, s/p left pyeloplasty in 6/2017 with stent placement. After stent was removed pt again developed left flank pain and found to have stricture of ureter. Pt presents today for presurgical evaluation for... 37 year old female with h/o left hydronephrosis, s/p left pyeloplasty in 6/2017 with stent placement. After stent was removed pt again developed left flank pain and found to have stricture of ureter. Pt presents today for presurgical evaluation for Cystoscopy, Left Retrograde Pyelogram, Left Ureteroscopy, Possible Left Endopyelotomy scheduled on 2/20/18.

## 2018-02-18 LAB
BACTERIA UR CULT: SIGNIFICANT CHANGE UP
SPECIMEN SOURCE: SIGNIFICANT CHANGE UP

## 2018-02-20 ENCOUNTER — RESULT REVIEW (OUTPATIENT)
Age: 38
End: 2018-02-20

## 2018-02-20 ENCOUNTER — INPATIENT (INPATIENT)
Facility: HOSPITAL | Age: 38
LOS: 0 days | Discharge: HOME | End: 2018-02-21
Attending: UROLOGY | Admitting: UROLOGY
Payer: MEDICAID

## 2018-02-20 ENCOUNTER — APPOINTMENT (OUTPATIENT)
Dept: UROLOGY | Facility: AMBULATORY SURGERY CENTER | Age: 38
End: 2018-02-20

## 2018-02-20 VITALS
OXYGEN SATURATION: 100 % | TEMPERATURE: 97 F | HEART RATE: 70 BPM | WEIGHT: 173.94 LBS | SYSTOLIC BLOOD PRESSURE: 127 MMHG | HEIGHT: 63 IN | RESPIRATION RATE: 16 BRPM | DIASTOLIC BLOOD PRESSURE: 77 MMHG

## 2018-02-20 DIAGNOSIS — N13.30 UNSPECIFIED HYDRONEPHROSIS: Chronic | ICD-10-CM

## 2018-02-20 DIAGNOSIS — N13.5 CROSSING VESSEL AND STRICTURE OF URETER WITHOUT HYDRONEPHROSIS: ICD-10-CM

## 2018-02-20 LAB
HCG UR-SCNC: NEGATIVE — SIGNIFICANT CHANGE UP
SP GR UR: 1.02 — SIGNIFICANT CHANGE UP (ref 1–1.03)

## 2018-02-20 PROCEDURE — 88305 TISSUE EXAM BY PATHOLOGIST: CPT | Mod: 26

## 2018-02-20 PROCEDURE — 52354 CYSTOURETERO W/BIOPSY: CPT | Mod: LT

## 2018-02-20 PROCEDURE — 74420 UROGRAPHY RTRGR +-KUB: CPT | Mod: 26

## 2018-02-20 RX ORDER — HEPARIN SODIUM 5000 [USP'U]/ML
5000 INJECTION INTRAVENOUS; SUBCUTANEOUS EVERY 8 HOURS
Qty: 0 | Refills: 0 | Status: DISCONTINUED | OUTPATIENT
Start: 2018-02-20 | End: 2018-02-21

## 2018-02-20 RX ORDER — CEFAZOLIN SODIUM 1 G
2000 VIAL (EA) INJECTION ONCE
Qty: 0 | Refills: 0 | Status: COMPLETED | OUTPATIENT
Start: 2018-02-20 | End: 2018-02-20

## 2018-02-20 RX ORDER — ACETAMINOPHEN 500 MG
650 TABLET ORAL EVERY 6 HOURS
Qty: 0 | Refills: 0 | Status: DISCONTINUED | OUTPATIENT
Start: 2018-02-20 | End: 2018-02-21

## 2018-02-20 RX ORDER — CEFAZOLIN SODIUM 1 G
VIAL (EA) INJECTION
Qty: 0 | Refills: 0 | Status: DISCONTINUED | OUTPATIENT
Start: 2018-02-20 | End: 2018-02-21

## 2018-02-20 RX ORDER — INFLUENZA VIRUS VACCINE 15; 15; 15; 15 UG/.5ML; UG/.5ML; UG/.5ML; UG/.5ML
0.5 SUSPENSION INTRAMUSCULAR ONCE
Qty: 0 | Refills: 0 | Status: COMPLETED | OUTPATIENT
Start: 2018-02-20 | End: 2018-02-21

## 2018-02-20 RX ORDER — SODIUM CHLORIDE 9 MG/ML
1000 INJECTION INTRAMUSCULAR; INTRAVENOUS; SUBCUTANEOUS
Qty: 0 | Refills: 0 | Status: DISCONTINUED | OUTPATIENT
Start: 2018-02-20 | End: 2018-02-21

## 2018-02-20 RX ORDER — CEFAZOLIN SODIUM 1 G
2000 VIAL (EA) INJECTION EVERY 8 HOURS
Qty: 0 | Refills: 0 | Status: DISCONTINUED | OUTPATIENT
Start: 2018-02-20 | End: 2018-02-21

## 2018-02-20 RX ADMIN — Medication 100 MILLIGRAM(S): at 19:57

## 2018-02-20 RX ADMIN — HEPARIN SODIUM 5000 UNIT(S): 5000 INJECTION INTRAVENOUS; SUBCUTANEOUS at 22:09

## 2018-02-20 RX ADMIN — SODIUM CHLORIDE 125 MILLILITER(S): 9 INJECTION INTRAMUSCULAR; INTRAVENOUS; SUBCUTANEOUS at 22:09

## 2018-02-20 NOTE — BRIEF OPERATIVE NOTE - COMMENTS
Unable to pass wire or contrast. Admit to [facility] inpatient for nephrostomy tube versus antegrade stent

## 2018-02-20 NOTE — BRIEF OPERATIVE NOTE - PROCEDURE
<<-----Click on this checkbox to enter Procedure Left ureteroscopy with biopsy  02/20/2018    [facility]

## 2018-02-21 ENCOUNTER — TRANSCRIPTION ENCOUNTER (OUTPATIENT)
Age: 38
End: 2018-02-21

## 2018-02-21 VITALS
RESPIRATION RATE: 16 BRPM | DIASTOLIC BLOOD PRESSURE: 73 MMHG | SYSTOLIC BLOOD PRESSURE: 109 MMHG | OXYGEN SATURATION: 100 % | TEMPERATURE: 98 F | HEART RATE: 62 BPM

## 2018-02-21 DIAGNOSIS — N13.30 UNSPECIFIED HYDRONEPHROSIS: ICD-10-CM

## 2018-02-21 LAB
APTT BLD: 31.4 SEC — SIGNIFICANT CHANGE UP (ref 27.5–37.4)
BASOPHILS # BLD AUTO: 0.02 K/UL — SIGNIFICANT CHANGE UP (ref 0–0.2)
BASOPHILS NFR BLD AUTO: 0.2 % — SIGNIFICANT CHANGE UP (ref 0–2)
BUN SERPL-MCNC: 12 MG/DL — SIGNIFICANT CHANGE UP (ref 7–23)
CALCIUM SERPL-MCNC: 8.1 MG/DL — LOW (ref 8.4–10.5)
CHLORIDE SERPL-SCNC: 106 MMOL/L — SIGNIFICANT CHANGE UP (ref 98–107)
CO2 SERPL-SCNC: 24 MMOL/L — SIGNIFICANT CHANGE UP (ref 22–31)
CREAT SERPL-MCNC: 0.84 MG/DL — SIGNIFICANT CHANGE UP (ref 0.5–1.3)
EOSINOPHIL # BLD AUTO: 0.02 K/UL — SIGNIFICANT CHANGE UP (ref 0–0.5)
EOSINOPHIL NFR BLD AUTO: 0.2 % — SIGNIFICANT CHANGE UP (ref 0–6)
GLUCOSE SERPL-MCNC: 104 MG/DL — HIGH (ref 70–99)
HCT VFR BLD CALC: 32.5 % — LOW (ref 34.5–45)
HGB BLD-MCNC: 10.4 G/DL — LOW (ref 11.5–15.5)
IMM GRANULOCYTES # BLD AUTO: 0.03 # — SIGNIFICANT CHANGE UP
IMM GRANULOCYTES NFR BLD AUTO: 0.4 % — SIGNIFICANT CHANGE UP (ref 0–1.5)
INR BLD: 1.04 — SIGNIFICANT CHANGE UP (ref 0.88–1.17)
LYMPHOCYTES # BLD AUTO: 2.09 K/UL — SIGNIFICANT CHANGE UP (ref 1–3.3)
LYMPHOCYTES # BLD AUTO: 25.6 % — SIGNIFICANT CHANGE UP (ref 13–44)
MCHC RBC-ENTMCNC: 28.7 PG — SIGNIFICANT CHANGE UP (ref 27–34)
MCHC RBC-ENTMCNC: 32 % — SIGNIFICANT CHANGE UP (ref 32–36)
MCV RBC AUTO: 89.5 FL — SIGNIFICANT CHANGE UP (ref 80–100)
MONOCYTES # BLD AUTO: 0.43 K/UL — SIGNIFICANT CHANGE UP (ref 0–0.9)
MONOCYTES NFR BLD AUTO: 5.3 % — SIGNIFICANT CHANGE UP (ref 2–14)
NEUTROPHILS # BLD AUTO: 5.56 K/UL — SIGNIFICANT CHANGE UP (ref 1.8–7.4)
NEUTROPHILS NFR BLD AUTO: 68.3 % — SIGNIFICANT CHANGE UP (ref 43–77)
NRBC # FLD: 0 — SIGNIFICANT CHANGE UP
PLATELET # BLD AUTO: 236 K/UL — SIGNIFICANT CHANGE UP (ref 150–400)
PMV BLD: 9.7 FL — SIGNIFICANT CHANGE UP (ref 7–13)
POTASSIUM SERPL-MCNC: 3.6 MMOL/L — SIGNIFICANT CHANGE UP (ref 3.5–5.3)
POTASSIUM SERPL-SCNC: 3.6 MMOL/L — SIGNIFICANT CHANGE UP (ref 3.5–5.3)
PROTHROM AB SERPL-ACNC: 12 SEC — SIGNIFICANT CHANGE UP (ref 9.8–13.1)
RBC # BLD: 3.63 M/UL — LOW (ref 3.8–5.2)
RBC # FLD: 13 % — SIGNIFICANT CHANGE UP (ref 10.3–14.5)
SODIUM SERPL-SCNC: 141 MMOL/L — SIGNIFICANT CHANGE UP (ref 135–145)
WBC # BLD: 8.15 K/UL — SIGNIFICANT CHANGE UP (ref 3.8–10.5)
WBC # FLD AUTO: 8.15 K/UL — SIGNIFICANT CHANGE UP (ref 3.8–10.5)

## 2018-02-21 PROCEDURE — 74176 CT ABD & PELVIS W/O CONTRAST: CPT | Mod: 26

## 2018-02-21 PROCEDURE — 50695 PLMT URETERAL STENT PRQ: CPT | Mod: LT

## 2018-02-21 RX ORDER — OXYCODONE HYDROCHLORIDE 5 MG/1
5 TABLET ORAL EVERY 4 HOURS
Qty: 0 | Refills: 0 | Status: DISCONTINUED | OUTPATIENT
Start: 2018-02-21 | End: 2018-02-21

## 2018-02-21 RX ORDER — OXYCODONE HYDROCHLORIDE 5 MG/1
10 TABLET ORAL EVERY 4 HOURS
Qty: 0 | Refills: 0 | Status: DISCONTINUED | OUTPATIENT
Start: 2018-02-21 | End: 2018-02-21

## 2018-02-21 RX ADMIN — Medication 650 MILLIGRAM(S): at 12:52

## 2018-02-21 RX ADMIN — HEPARIN SODIUM 5000 UNIT(S): 5000 INJECTION INTRAVENOUS; SUBCUTANEOUS at 05:26

## 2018-02-21 RX ADMIN — SODIUM CHLORIDE 125 MILLILITER(S): 9 INJECTION INTRAMUSCULAR; INTRAVENOUS; SUBCUTANEOUS at 12:44

## 2018-02-21 RX ADMIN — INFLUENZA VIRUS VACCINE 0.5 MILLILITER(S): 15; 15; 15; 15 SUSPENSION INTRAMUSCULAR at 16:05

## 2018-02-21 RX ADMIN — Medication 100 MILLIGRAM(S): at 05:25

## 2018-02-21 RX ADMIN — OXYCODONE HYDROCHLORIDE 5 MILLIGRAM(S): 5 TABLET ORAL at 15:56

## 2018-02-21 NOTE — DISCHARGE NOTE ADULT - MEDICATION SUMMARY - MEDICATIONS TO STOP TAKING
I will STOP taking the medications listed below when I get home from the hospital:    acetaminophen 325 mg oral tablet  -- 2 tab(s) by mouth every 6 hours, As needed, Mild Pain (1 - 3)    HYDROmorphone 2 mg oral tablet  -- 1 to 2 tab(s) by mouth every 4 to 6 hours, As needed, For Pain MDD:6

## 2018-02-21 NOTE — PROGRESS NOTE ADULT - SUBJECTIVE AND OBJECTIVE BOX
POD #1    Afeb 100/62 57 100%RA    Pt has no c/o    Abd- soft NT ND; some L. CVAT    Void 800    CT - L. hydro

## 2018-02-21 NOTE — CHART NOTE - NSCHARTNOTEFT_GEN_A_CORE
Patient Age: 37    Patient Gender: F    Procedure (including site / side if known): L. nephrostomy tube/stent if poss; nephrostogram    Diagnosis / Indication: L. hydroneph/UPJ obst    Interventional Radiology Attending Physician: Catherine    Ordering Attending Physician: Santy    Pertinent Medical History:    PAST MEDICAL & SURGICAL HISTORY:  Seasonal allergies  Hydronephrosis, left  Acquired hydronephrosis: left pyeloplasty 6/2017        Pertinent Labs:                            10.4   8.15  )-----------( 236      ( 21 Feb 2018 05:54 )             32.5       02-21    141  |  106  |  12  ----------------------------<  104<H>  3.6   |  24  |  0.84    Ca    8.1<L>      21 Feb 2018 05:54        PT/INR - ( 21 Feb 2018 05:54 )   PT: 12.0 SEC;   INR: 1.04          PTT - ( 21 Feb 2018 05:54 )  PTT:31.4 SEC    Patient and Family aware:   [ X ]Y   [  ]N

## 2018-02-21 NOTE — DISCHARGE NOTE ADULT - PATIENT PORTAL LINK FT
You can access the [facility] Patient Portal, offered by [facility], by registering with the following website: [email or web address]

## 2018-02-21 NOTE — DISCHARGE NOTE ADULT - NS AS DC FOLLOWUP STROKE INST
Smoking Cessation/Influenza vaccination (VIS Pub Date: August 19, 2014)/hydronephrosis, nephrostomy tube care

## 2018-02-21 NOTE — DISCHARGE NOTE ADULT - PLAN OF CARE
L. neph tube May shower tomorrow; if dressing does not get wet may change it every 2 days; be careful not to pull on tube; if fever develops or very severe pain that increases, uncap tube and attach to bag as shown; make appt for clinic a week from Friday March 2

## 2018-02-21 NOTE — DISCHARGE NOTE ADULT - HOSPITAL COURSE
Pt had pyeloplasty last year; developed hydronephrosis and pain and yesterday went to OR; stent not able to be placed; today IR placed stent and nephrostomy tube; home today with capped neph tube; f/u in clinic next week

## 2018-02-21 NOTE — DISCHARGE NOTE ADULT - INSTRUCTIONS
Make a follow up appointment with Dr. Madera. Call if you develop a fever or if you have pain not relieved by medication. Nephrostomy tube care as instructed. Drink plenty of liquids. as tolerated

## 2018-02-21 NOTE — DISCHARGE NOTE ADULT - CONDITIONS AT DISCHARGE
Pt is afebrile and offers no complaints. In no acute distress. Left nephrostomy tube patent and draining adequate amounts of bloody urine. Pt is voiding as well. Ambulating ad della and tolerating diet.

## 2018-02-21 NOTE — DISCHARGE NOTE ADULT - CARE PLAN
Principal Discharge DX:	Hydronephrosis, left  Goal:	L. neph tube  Assessment and plan of treatment:	May shower tomorrow; if dressing does not get wet may change it every 2 days; be careful not to pull on tube; if fever develops or very severe pain that increases, uncap tube and attach to bag as shown; make appt for clinic a week from Friday March 2

## 2018-02-21 NOTE — DISCHARGE NOTE ADULT - MEDICATION SUMMARY - MEDICATIONS TO TAKE
I will START or STAY ON the medications listed below when I get home from the hospital:    oxyCODONE-acetaminophen 5 mg-325 mg oral tablet  -- 1-2 tab(s) by mouth every 6 hours, As Needed MDD:8  -- Caution federal law prohibits the transfer of this drug to any person other  than the person for whom it was prescribed.  May cause drowsiness.  Alcohol may intensify this effect.  Use care when operating dangerous machinery.  This prescription cannot be refilled.  This product contains acetaminophen.  Do not use  with any other product containing acetaminophen to prevent possible liver damage.  Using more of this medication than prescribed may cause serious breathing problems.    -- Indication: For pain

## 2018-02-22 LAB
SPECIMEN SOURCE: SIGNIFICANT CHANGE UP
SPECIMEN SOURCE: SIGNIFICANT CHANGE UP

## 2018-02-23 LAB — BACTERIA UR CULT: SIGNIFICANT CHANGE UP

## 2018-02-28 ENCOUNTER — TRANSCRIPTION ENCOUNTER (OUTPATIENT)
Age: 38
End: 2018-02-28

## 2018-03-02 ENCOUNTER — OUTPATIENT (OUTPATIENT)
Dept: OUTPATIENT SERVICES | Facility: HOSPITAL | Age: 38
LOS: 1 days | End: 2018-03-02
Payer: SELF-PAY

## 2018-03-02 ENCOUNTER — APPOINTMENT (OUTPATIENT)
Dept: UROLOGY | Facility: CLINIC | Age: 38
End: 2018-03-02

## 2018-03-02 VITALS
TEMPERATURE: 98.3 F | SYSTOLIC BLOOD PRESSURE: 120 MMHG | DIASTOLIC BLOOD PRESSURE: 79 MMHG | RESPIRATION RATE: 16 BRPM | HEART RATE: 69 BPM

## 2018-03-02 DIAGNOSIS — N13.30 UNSPECIFIED HYDRONEPHROSIS: Chronic | ICD-10-CM

## 2018-03-02 DIAGNOSIS — R35.0 FREQUENCY OF MICTURITION: ICD-10-CM

## 2018-03-02 LAB
ANION GAP SERPL CALC-SCNC: 13 MMOL/L
BUN SERPL-MCNC: 12 MG/DL
CALCIUM SERPL-MCNC: 9.2 MG/DL
CHLORIDE SERPL-SCNC: 99 MMOL/L
CO2 SERPL-SCNC: 27 MMOL/L
CREAT SERPL-MCNC: 0.74 MG/DL
GLUCOSE SERPL-MCNC: 99 MG/DL
POTASSIUM SERPL-SCNC: 4.2 MMOL/L
SODIUM SERPL-SCNC: 139 MMOL/L

## 2018-03-02 PROCEDURE — G0463: CPT

## 2018-03-02 PROCEDURE — 50435 EXCHANGE NEPHROSTOMY CATH: CPT

## 2018-03-03 ENCOUNTER — EMERGENCY (EMERGENCY)
Facility: HOSPITAL | Age: 38
LOS: 1 days | Discharge: HOME | End: 2018-03-03
Attending: EMERGENCY MEDICINE | Admitting: EMERGENCY MEDICINE
Payer: MEDICAID

## 2018-03-03 ENCOUNTER — MOBILE ON CALL (OUTPATIENT)
Age: 38
End: 2018-03-03

## 2018-03-03 VITALS
SYSTOLIC BLOOD PRESSURE: 117 MMHG | RESPIRATION RATE: 16 BRPM | TEMPERATURE: 98 F | HEART RATE: 65 BPM | DIASTOLIC BLOOD PRESSURE: 82 MMHG | OXYGEN SATURATION: 100 %

## 2018-03-03 DIAGNOSIS — Z87.448 PERSONAL HISTORY OF OTHER DISEASES OF URINARY SYSTEM: Chronic | ICD-10-CM

## 2018-03-03 DIAGNOSIS — N13.30 UNSPECIFIED HYDRONEPHROSIS: Chronic | ICD-10-CM

## 2018-03-03 LAB
ALBUMIN SERPL ELPH-MCNC: 4 G/DL — SIGNIFICANT CHANGE UP (ref 3.3–5)
ALP SERPL-CCNC: 73 U/L — SIGNIFICANT CHANGE UP (ref 40–120)
ALT FLD-CCNC: 15 U/L — SIGNIFICANT CHANGE UP (ref 4–33)
APPEARANCE UR: CLEAR — SIGNIFICANT CHANGE UP
AST SERPL-CCNC: 16 U/L — SIGNIFICANT CHANGE UP (ref 4–32)
BACTERIA # UR AUTO: SIGNIFICANT CHANGE UP
BASOPHILS # BLD AUTO: 0.04 K/UL — SIGNIFICANT CHANGE UP (ref 0–0.2)
BASOPHILS NFR BLD AUTO: 0.6 % — SIGNIFICANT CHANGE UP (ref 0–2)
BILIRUB SERPL-MCNC: 0.2 MG/DL — SIGNIFICANT CHANGE UP (ref 0.2–1.2)
BILIRUB UR-MCNC: NEGATIVE — SIGNIFICANT CHANGE UP
BLOOD UR QL VISUAL: HIGH
BUN SERPL-MCNC: 8 MG/DL — SIGNIFICANT CHANGE UP (ref 7–23)
CALCIUM SERPL-MCNC: 8.6 MG/DL — SIGNIFICANT CHANGE UP (ref 8.4–10.5)
CHLORIDE SERPL-SCNC: 102 MMOL/L — SIGNIFICANT CHANGE UP (ref 98–107)
CO2 SERPL-SCNC: 25 MMOL/L — SIGNIFICANT CHANGE UP (ref 22–31)
COLOR SPEC: SIGNIFICANT CHANGE UP
CREAT SERPL-MCNC: 0.62 MG/DL — SIGNIFICANT CHANGE UP (ref 0.5–1.3)
EOSINOPHIL # BLD AUTO: 0.23 K/UL — SIGNIFICANT CHANGE UP (ref 0–0.5)
EOSINOPHIL NFR BLD AUTO: 3.6 % — SIGNIFICANT CHANGE UP (ref 0–6)
GLUCOSE SERPL-MCNC: 90 MG/DL — SIGNIFICANT CHANGE UP (ref 70–99)
GLUCOSE UR-MCNC: NEGATIVE — SIGNIFICANT CHANGE UP
HCG SERPL-ACNC: < 5 MIU/ML — SIGNIFICANT CHANGE UP
HCT VFR BLD CALC: 36.2 % — SIGNIFICANT CHANGE UP (ref 34.5–45)
HGB BLD-MCNC: 11.8 G/DL — SIGNIFICANT CHANGE UP (ref 11.5–15.5)
IMM GRANULOCYTES # BLD AUTO: 0.02 # — SIGNIFICANT CHANGE UP
IMM GRANULOCYTES NFR BLD AUTO: 0.3 % — SIGNIFICANT CHANGE UP (ref 0–1.5)
KETONES UR-MCNC: NEGATIVE — SIGNIFICANT CHANGE UP
LEUKOCYTE ESTERASE UR-ACNC: HIGH
LYMPHOCYTES # BLD AUTO: 2.53 K/UL — SIGNIFICANT CHANGE UP (ref 1–3.3)
LYMPHOCYTES # BLD AUTO: 39.5 % — SIGNIFICANT CHANGE UP (ref 13–44)
MCHC RBC-ENTMCNC: 28.4 PG — SIGNIFICANT CHANGE UP (ref 27–34)
MCHC RBC-ENTMCNC: 32.6 % — SIGNIFICANT CHANGE UP (ref 32–36)
MCV RBC AUTO: 87.2 FL — SIGNIFICANT CHANGE UP (ref 80–100)
MONOCYTES # BLD AUTO: 0.41 K/UL — SIGNIFICANT CHANGE UP (ref 0–0.9)
MONOCYTES NFR BLD AUTO: 6.4 % — SIGNIFICANT CHANGE UP (ref 2–14)
NEUTROPHILS # BLD AUTO: 3.18 K/UL — SIGNIFICANT CHANGE UP (ref 1.8–7.4)
NEUTROPHILS NFR BLD AUTO: 49.6 % — SIGNIFICANT CHANGE UP (ref 43–77)
NITRITE UR-MCNC: NEGATIVE — SIGNIFICANT CHANGE UP
NRBC # FLD: 0 — SIGNIFICANT CHANGE UP
PH UR: 6.5 — SIGNIFICANT CHANGE UP (ref 4.6–8)
PLATELET # BLD AUTO: 254 K/UL — SIGNIFICANT CHANGE UP (ref 150–400)
PMV BLD: 9.6 FL — SIGNIFICANT CHANGE UP (ref 7–13)
POTASSIUM SERPL-MCNC: 4.1 MMOL/L — SIGNIFICANT CHANGE UP (ref 3.5–5.3)
POTASSIUM SERPL-SCNC: 4.1 MMOL/L — SIGNIFICANT CHANGE UP (ref 3.5–5.3)
PROT SERPL-MCNC: 7.4 G/DL — SIGNIFICANT CHANGE UP (ref 6–8.3)
PROT UR-MCNC: 30 MG/DL — HIGH
RBC # BLD: 4.15 M/UL — SIGNIFICANT CHANGE UP (ref 3.8–5.2)
RBC # FLD: 12.7 % — SIGNIFICANT CHANGE UP (ref 10.3–14.5)
RBC CASTS # UR COMP ASSIST: HIGH (ref 0–?)
SODIUM SERPL-SCNC: 139 MMOL/L — SIGNIFICANT CHANGE UP (ref 135–145)
SP GR SPEC: 1.01 — SIGNIFICANT CHANGE UP (ref 1–1.04)
UROBILINOGEN FLD QL: NORMAL MG/DL — SIGNIFICANT CHANGE UP
WBC # BLD: 6.41 K/UL — SIGNIFICANT CHANGE UP (ref 3.8–10.5)
WBC # FLD AUTO: 6.41 K/UL — SIGNIFICANT CHANGE UP (ref 3.8–10.5)
WBC UR QL: HIGH (ref 0–?)

## 2018-03-03 PROCEDURE — 76775 US EXAM ABDO BACK WALL LIM: CPT | Mod: 26

## 2018-03-03 PROCEDURE — 99284 EMERGENCY DEPT VISIT MOD MDM: CPT

## 2018-03-03 PROCEDURE — 99220: CPT

## 2018-03-03 RX ORDER — OXYCODONE AND ACETAMINOPHEN 5; 325 MG/1; MG/1
1 TABLET ORAL ONCE
Qty: 0 | Refills: 0 | Status: DISCONTINUED | OUTPATIENT
Start: 2018-03-03 | End: 2018-03-03

## 2018-03-03 RX ADMIN — OXYCODONE AND ACETAMINOPHEN 1 TABLET(S): 5; 325 TABLET ORAL at 22:50

## 2018-03-03 RX ADMIN — OXYCODONE AND ACETAMINOPHEN 1 TABLET(S): 5; 325 TABLET ORAL at 22:15

## 2018-03-03 NOTE — ED PROVIDER NOTE - OBJECTIVE STATEMENT
38F with h/o pyeloplasty and left hydronephrosis s/p left nephrostomy tube sent to ED by urologist for evaluation of likely post-obstructive diuresis. Patient had nephrostomy tube exchanged yesterday with large urine output after exchange, was advised to contact urology answering service if increased urine output >500cc/hr. Patient has been having ~600cc/hr, contacted urology answering service and was advised to come to ED for evaluation of likely post-obstructive diuresis. Patient also c/o chronic pain at site of nephrostomy tube, unchanged from baseline pain.

## 2018-03-03 NOTE — ED CDU PROVIDER INITIAL DAY NOTE - PMH
Hydronephrosis, left    Seasonal allergies Hydronephrosis, left    Seasonal allergies    Ureteral stricture  (left percutaneous nephrostomy and ureteral stent placed 2/21/18)

## 2018-03-03 NOTE — ED ADULT NURSE NOTE - OBJECTIVE STATEMENT
Tiffany RN: Pt rcvd to room 20, c/o increased drainage from L neph tube. Pt had nephrostomy tube placed 2 weeks ago, then removed yesterday and had to be replaced. Pt was told to go to ER if output increased. In 3 hours, pt had 600ml output. Pt arrives with nephrostomy drainage bag full. Denies any other complaints at this time. In NAD. Denies fever/chills. Site dry/intact/dressed. States neph tube was placed due to "blockage in kidney." Evaluated by MD. Labs drawn & sent. 20g IV placed to L ac. IV dry/intact/patent. VSS. Report given to primary RN.

## 2018-03-03 NOTE — ED CDU PROVIDER INITIAL DAY NOTE - MEDICAL DECISION MAKING DETAILS
Encourage PO hydration, monitor I&O's, am labs, Urology team reassessment, general observation and reassessment.

## 2018-03-03 NOTE — ED ADULT TRIAGE NOTE - CHIEF COMPLAINT QUOTE
Pt 2 weeks s/p left sided nephrostomy tube placement, tube taken out yesterday and replaced due to increased fluid production. Pt c/o voiding approx 600 ccs of fluid into tube x 3 hours. Told to come in by MD for further eval. Pt denies complaints. Appears comfortable.

## 2018-03-03 NOTE — ED CDU PROVIDER INITIAL DAY NOTE - GASTROINTESTINAL, MLM
Abdomen soft, non-tender, no rebound, no guarding. Abdomen soft and flat, mildly TTP to mid abdomen and around left nephrostomy (site is clean and dry with intact dry gauze/Tegaderm overlying site), otherwise objectively non-tender, no rebound, no guarding.  Nephrostomy tube draining clear urine.

## 2018-03-03 NOTE — ED CDU PROVIDER INITIAL DAY NOTE - PSH
Acquired hydronephrosis  left pyeloplasty 6/2017 Acquired hydronephrosis  left pyeloplasty 6/2017  History of nephrostomy  (Left percutaneous nephrostomy and ureteral stent placed 2/21/18)

## 2018-03-03 NOTE — ED CDU PROVIDER INITIAL DAY NOTE - OBJECTIVE STATEMENT
38F with h/o pyeloplasty and left hydronephrosis s/p left nephrostomy tube sent to ED by urologist for evaluation of likely post-obstructive diuresis. Patient had nephrostomy tube exchanged yesterday with large urine output after exchange, was advised to contact urology answering service if increased urine output >500cc/hr. Patient has been having ~600cc/hr, contacted urology answering service and was advised to come to ED for evaluation of likely post-obstructive diuresis. Patient also c/o chronic pain at site of nephrostomy tube, unchanged from baseline pain.    CDU NOEMI Guzman Note--------  39 yo female, PMH of left hydronephrisis with hx/o left pyeloplasty 6/2017 with stent placement; after stent removed, had left flank pain and was dx with ureteral stricture.  Pt. was scheduled for 2/20/18 left ureteroscopy with biopsy; per Operative Note in EMR, "unable to pass wire or contrast"; pt was admitted to [facility] and had IR procedure 2/21/18: Left percutaneous nephrostomy with insertion of left nephrostomy tube and ureteral stent.  As per above, pt had nephrostomy tube exchanged yesterday with large urine output after exchange; per pt's urologist, was advised to come to the ED for further eval of likely post-obstructive diuresis.  Pt. was evaluated in the ED and Urology was consulted.  Per discussion with Urology team (see Progress Note), advised PO hydration to meet losses from nephrostomy tube, and observation/monitoring overnight and am labs.  On CDU arrival, pt has no active c/o.  CDU plan d/w pt; pt verbalized agreement with plan. 38F with h/o pyeloplasty and left hydronephrosis s/p left nephrostomy tube sent to ED by urologist for evaluation of likely post-obstructive diuresis. Patient had nephrostomy tube exchanged yesterday with large urine output after exchange, was advised to contact urology answering service if increased urine output >500cc/hr. Patient has been having ~600cc/hr, contacted urology answering service and was advised to come to ED for evaluation of likely post-obstructive diuresis. Patient also c/o chronic pain at site of nephrostomy tube, unchanged from baseline pain.    CDU NOEMI Guzman Note--------  39 yo female, PMH of left hydronephrisis with hx/o left pyeloplasty 6/2017 with stent placement; after stent removed, had left flank pain and was dx with ureteral stricture.  Pt. was scheduled for 2/20/18 left ureteroscopy with biopsy; per Operative Note in EMR, "unable to pass wire or contrast"; pt was admitted to [facility] and had IR procedure 2/21/18: Left percutaneous nephrostomy with insertion of left nephrostomy tube and ureteral stent.  As per above, pt had nephrostomy tube exchanged yesterday with large urine output after exchange; per pt's urologist, was advised to come to the ED for further eval of likely post-obstructive diuresis.  Pt. was evaluated in the ED and Urology was consulted.  Per discussion with Urology team (see Progress Note), advised PO hydration to meet losses from nephrostomy tube, and observation/monitoring overnight and am labs.  On CDU arrival, pt has no active c/o; on ROS, pt states 6/10 flank pain at nephrostomy site, c/w pain she has been having since nephrostomy tube placed; no new/worsening pain; no hx/o N/V/D/fevers.  CDU plan d/w pt; pt verbalized agreement with plan. 38F with h/o pyeloplasty and left hydronephrosis s/p left nephrostomy tube sent to ED by urologist for evaluation of likely post-obstructive diuresis. Patient had nephrostomy tube exchanged yesterday with large urine output after exchange, was advised to contact urology answering service if increased urine output >500cc/hr. Patient has been having ~600cc/hr, contacted urology answering service and was advised to come to ED for evaluation of likely post-obstructive diuresis. Patient also c/o chronic pain at site of nephrostomy tube, unchanged from baseline pain.    CDU NOEMI Guzman Note--------  39 yo [place] >ENGLISH SPEAKING female, PMH of left hydronephrisis with hx/o left pyeloplasty 6/2017 with stent placement; after stent removed, had left flank pain and was dx with ureteral stricture.  Pt. was scheduled for 2/20/18 left ureteroscopy with biopsy; per Operative Note in EMR, "unable to pass wire or contrast"; pt was admitted to [facility] and had IR procedure 2/21/18: Left percutaneous nephrostomy with insertion of left nephrostomy tube and ureteral stent.  As per above, pt had nephrostomy tube exchanged yesterday with large urine output after exchange; per pt's urologist, was advised to come to the ED for further eval of likely post-obstructive diuresis.  Pt. was evaluated in the ED and Urology was consulted.  Per discussion with Urology team (see Progress Note), advised PO hydration to meet losses from nephrostomy tube, and observation/monitoring overnight and am labs.  On CDU arrival, pt states 6/10 left flank pain at nephrostomy site, c/w pain she has been having since nephrostomy tube placed; also states she has been having mid abd pain since nephrostomy tube placed.  Pt. states no new/worsening pain; no hx/o N/V/D/fevers.  CDU plan (encourage PO hydration, monitor I&O's, am labs, Urology team reassessment, general observation and reassessment) d/w pt; pt verbalized agreement with plan.

## 2018-03-03 NOTE — ED CDU PROVIDER INITIAL DAY NOTE - ATTENDING CONTRIBUTION TO CARE
I performed a face to face history and physical exam of the patient and discussed their management with the resident. I reviewed the resident's note and agree with the documented findings and plan of care. 39 y/o female with pyeloplasty and L hydronephrosis, s/p L nephrostomy tube replacement yesterday with greater than 600cc of urine/hr, pt with pain at site, no fever, no chills, no hemturia, pt seen in ED by urology, initial labs wnL, pt admitted to observation unit for I&O monitoring, repeat BMP, IV hydration, urology reassessment. I performed a face to face history and physical exam of the patient and discussed their management with the PA. I reviewed the PA's note and agree with the documented findings and plan of care. 39 y/o female with pyeloplasty and L hydronephrosis, s/p L nephrostomy tube replacement yesterday with greater than 600cc of urine/hr, pt with pain at site, no fever, no chills, no hemturia, pt seen in ED by urology, initial labs wnL, pt admitted to observation unit for close I&O monitoring, repeat BMP, IV hydration, urology reassessment.

## 2018-03-03 NOTE — ED PROVIDER NOTE - ATTENDING CONTRIBUTION TO CARE
I performed a face to face history and physical exam of the patient and discussed their management with the resident. I reviewed the resident's note and agree with the documented findings and plan of care. 39 y/o female with pyeloplasty and L hydronephrosis, s/p L nephrostomy tube replacement yesterday with greater than 600cc of urine/hr, pt with pain at site, no fever, no chills, no hemturia, 1)CBC, CMP, U/A, 2)Urology 3)re-evaluate

## 2018-03-03 NOTE — ED CDU PROVIDER INITIAL DAY NOTE - INDICATION FOR OBSERVATION
Other Encourage PO hydration, monitor I&O's, am labs, Urology team reassessment, general observation and reassessment./Other

## 2018-03-03 NOTE — ED PROVIDER NOTE - MEDICAL DECISION MAKING DETAILS
38F with h/o pyeloplasty and left hydronephrosis s/p left nephrostomy tube sent to ED by urologist for evaluation of likely post-obstructive diuresis. Will check BMP and reassess.

## 2018-03-03 NOTE — ED CDU PROVIDER INITIAL DAY NOTE - PROGRESS NOTE DETAILS
Of note, at 1945 hrs, I had spoken to the covering Urology PA Lindsey Christensen regarding plan of care; she advised to encourage PO hydration and stated to repeat bmp in am; Uro team will be rounding on pt in am.  CDU RN caring for pt notified of plan on arrival of pt to unit; I's and O's being monitored.  Pt. stable at present; will continue to monitor.

## 2018-03-04 VITALS
RESPIRATION RATE: 16 BRPM | OXYGEN SATURATION: 98 % | DIASTOLIC BLOOD PRESSURE: 63 MMHG | SYSTOLIC BLOOD PRESSURE: 103 MMHG | HEART RATE: 72 BPM | TEMPERATURE: 98 F

## 2018-03-04 LAB
ALBUMIN SERPL ELPH-MCNC: 3.9 G/DL — SIGNIFICANT CHANGE UP (ref 3.3–5)
ALP SERPL-CCNC: 67 U/L — SIGNIFICANT CHANGE UP (ref 40–120)
ALT FLD-CCNC: 12 U/L — SIGNIFICANT CHANGE UP (ref 4–33)
AST SERPL-CCNC: 12 U/L — SIGNIFICANT CHANGE UP (ref 4–32)
BASOPHILS # BLD AUTO: 0.03 K/UL — SIGNIFICANT CHANGE UP (ref 0–0.2)
BASOPHILS NFR BLD AUTO: 0.5 % — SIGNIFICANT CHANGE UP (ref 0–2)
BILIRUB SERPL-MCNC: 0.2 MG/DL — SIGNIFICANT CHANGE UP (ref 0.2–1.2)
BUN SERPL-MCNC: 10 MG/DL — SIGNIFICANT CHANGE UP (ref 7–23)
CALCIUM SERPL-MCNC: 8.6 MG/DL — SIGNIFICANT CHANGE UP (ref 8.4–10.5)
CHLORIDE SERPL-SCNC: 99 MMOL/L — SIGNIFICANT CHANGE UP (ref 98–107)
CO2 SERPL-SCNC: 25 MMOL/L — SIGNIFICANT CHANGE UP (ref 22–31)
CREAT SERPL-MCNC: 0.77 MG/DL — SIGNIFICANT CHANGE UP (ref 0.5–1.3)
EOSINOPHIL # BLD AUTO: 0.27 K/UL — SIGNIFICANT CHANGE UP (ref 0–0.5)
EOSINOPHIL NFR BLD AUTO: 4.4 % — SIGNIFICANT CHANGE UP (ref 0–6)
GLUCOSE SERPL-MCNC: 92 MG/DL — SIGNIFICANT CHANGE UP (ref 70–99)
HCT VFR BLD CALC: 34.5 % — SIGNIFICANT CHANGE UP (ref 34.5–45)
HGB BLD-MCNC: 11.2 G/DL — LOW (ref 11.5–15.5)
IMM GRANULOCYTES # BLD AUTO: 0.01 # — SIGNIFICANT CHANGE UP
IMM GRANULOCYTES NFR BLD AUTO: 0.2 % — SIGNIFICANT CHANGE UP (ref 0–1.5)
LYMPHOCYTES # BLD AUTO: 2.86 K/UL — SIGNIFICANT CHANGE UP (ref 1–3.3)
LYMPHOCYTES # BLD AUTO: 46.5 % — HIGH (ref 13–44)
MCHC RBC-ENTMCNC: 28.4 PG — SIGNIFICANT CHANGE UP (ref 27–34)
MCHC RBC-ENTMCNC: 32.5 % — SIGNIFICANT CHANGE UP (ref 32–36)
MCV RBC AUTO: 87.6 FL — SIGNIFICANT CHANGE UP (ref 80–100)
MONOCYTES # BLD AUTO: 0.41 K/UL — SIGNIFICANT CHANGE UP (ref 0–0.9)
MONOCYTES NFR BLD AUTO: 6.7 % — SIGNIFICANT CHANGE UP (ref 2–14)
NEUTROPHILS # BLD AUTO: 2.57 K/UL — SIGNIFICANT CHANGE UP (ref 1.8–7.4)
NEUTROPHILS NFR BLD AUTO: 41.7 % — LOW (ref 43–77)
NRBC # FLD: 0 — SIGNIFICANT CHANGE UP
PLATELET # BLD AUTO: 249 K/UL — SIGNIFICANT CHANGE UP (ref 150–400)
PMV BLD: 9.4 FL — SIGNIFICANT CHANGE UP (ref 7–13)
POTASSIUM SERPL-MCNC: 3.5 MMOL/L — SIGNIFICANT CHANGE UP (ref 3.5–5.3)
POTASSIUM SERPL-SCNC: 3.5 MMOL/L — SIGNIFICANT CHANGE UP (ref 3.5–5.3)
PROT SERPL-MCNC: 6.9 G/DL — SIGNIFICANT CHANGE UP (ref 6–8.3)
RBC # BLD: 3.94 M/UL — SIGNIFICANT CHANGE UP (ref 3.8–5.2)
RBC # FLD: 12.4 % — SIGNIFICANT CHANGE UP (ref 10.3–14.5)
SODIUM SERPL-SCNC: 135 MMOL/L — SIGNIFICANT CHANGE UP (ref 135–145)
WBC # BLD: 6.15 K/UL — SIGNIFICANT CHANGE UP (ref 3.8–10.5)
WBC # FLD AUTO: 6.15 K/UL — SIGNIFICANT CHANGE UP (ref 3.8–10.5)

## 2018-03-04 PROCEDURE — 99217: CPT

## 2018-03-04 NOTE — CONSULT NOTE ADULT - ASSESSMENT
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  -- electrolytes WNL  -- monitor UOP, strict I & O, please record UOP every hour  -- encourage PO, pt should drink to thirst  -- UOP should be less than 200cc/hr  -- repeat BMP in AM  -- d/w Dr. Madera

## 2018-03-04 NOTE — CONSULT NOTE ADULT - SUBJECTIVE AND OBJECTIVE BOX
HPI  37F hx UPJ obstruction s/p L robotic pyeloplasty 17 s/p placement of L antegrade stent and L NT for distal polyp causing recurrent obstruction now p/w increased UOP from NT after exchange on 3/2. Pt p/w to clinic on 3/2 for removal of L NT. When the cope loupe was cut, brisk output from tube was noted, L NT was exchanged and pt was sent home w/NT to drainage. BMP at the time revealed nl electrolytes and Cr of .74. Since leaving clinic. NT output was copious, ~500-600cc every 1-2 hours. Pt has been attempting to keep up w/losses w/PO hydration. Pt denies muscle cramps, confusion, palpitations.    PAST MEDICAL & SURGICAL HISTORY:  Ureteral stricture: (left percutaneous nephrostomy and ureteral stent placed 18)  Seasonal allergies  Hydronephrosis, left  History of nephrostomy: (Left percutaneous nephrostomy and ureteral stent placed 18)  Acquired hydronephrosis: left pyeloplasty 2017      MEDICATIONS  (STANDING):    MEDICATIONS  (PRN):      FAMILY HISTORY:  Family history of uterine cancer (Mother)      Allergies    No Known Allergies    Intolerances        SOCIAL HISTORY:    REVIEW OF SYSTEMS: Otherwise negative as stated in HPI    Physical Exam  Vital signs  T(C): 36.7 (18 @ 16:18), Max: 36.7 (18 @ 14:07)  HR: 68 (18 @ 16:18)  BP: 106/65 (18 @ 16:18)  SpO2: 100% (18 @ 16:18)  Wt(kg): --    Output    UOP    Gen:  [x] NAD [] toxic    Pulm:  [x] no resp distress [] no substernal retractions  	  CV:  [x] no JVD  [x] RRR    GI:  [x] Soft [x] ND left lower quadrant discomfort    Back: L NT output clear  mild L discomfort around L NT    LABS:       @ 16:11    WBC 6.41  / Hct 36.2  / SCr 0.62         139  |  102  |  8   ----------------------------<  90  4.1   |  25  |  0.62    Ca    8.6      03 Mar 2018 16:11    TPro  7.4  /  Alb  4.0  /  TBili  0.2  /  DBili  x   /  AST  16  /  ALT  15  /  AlkPhos  73  03-03      Urinalysis Basic - ( 03 Mar 2018 19:11 )    Color: COLORL / Appearance: CLEAR / S.006 / pH: 6.5  Gluc: NEGATIVE / Ketone: NEGATIVE  / Bili: NEGATIVE / Urobili: NORMAL mg/dL   Blood: MODERATE / Protein: 30 mg/dL / Nitrite: NEGATIVE   Leuk Esterase: MODERATE / RBC: 5-10 / WBC 5-10   Sq Epi: x / Non Sq Epi: x / Bacteria: FEW    RADIOLOGY:

## 2018-03-04 NOTE — ED CDU PROVIDER SUBSEQUENT DAY NOTE - PMH
Hydronephrosis, left    Seasonal allergies    Ureteral stricture  (left percutaneous nephrostomy and ureteral stent placed 2/21/18)

## 2018-03-04 NOTE — PROGRESS NOTE ADULT - SUBJECTIVE AND OBJECTIVE BOX
Subjective    Patient seen and examined. No overnight events. No urination per urethra. Denies fevers/chills/flank pain.     Objective    Vital signs  T(F): , Max: 98.1 (03-03-18 @ 14:07)  HR: 72 (03-04-18 @ 10:00)  BP: 103/63 (03-04-18 @ 10:00)  SpO2: 98% (03-04-18 @ 10:00)  Wt(kg): --    Output     03-03 @ 07:01  -  03-04 @ 07:00  --------------------------------------------------------  IN: 2900 mL / OUT: 2325 mL / NET: 575 mL    03-04 @ 07:01  -  03-04 @ 11:52  --------------------------------------------------------  IN: 800 mL / OUT: 1500 mL / NET: -700 mL        General: NAD  Abdomen: soft/non-tender/non-distended  : NT in place, clear yellow urine    Labs      03-04 @ 05:30    WBC 6.15  / Hct 34.5  / SCr 0.77     03-03 @ 16:11    WBC 6.41  / Hct 36.2  / SCr 0.62

## 2018-03-04 NOTE — ED CDU PROVIDER SUBSEQUENT DAY NOTE - PSH
Acquired hydronephrosis  left pyeloplasty 6/2017  History of nephrostomy  (Left percutaneous nephrostomy and ureteral stent placed 2/21/18)

## 2018-03-04 NOTE — ED CDU PROVIDER SUBSEQUENT DAY NOTE - MEDICAL DECISION MAKING DETAILS
38F p/w sent in by urologist due to possible post-obstructive diuresis - pt has been passing > 500cc/hr.  Pt with L Hydronephrosis with stent and nephrostomy tube.  Flank pain on L side -> stricture, L ureteroscopy, IR procedure feb 21, perc nephrostomy + stent.  2 D ago, urologist -> acute worsening pain, urine leakage.  (+)600cc of UO / hr.  Placed in CDU for obs of postobstructive diuresis.  PO hydration, titrate to thirst, following electrolytes.  Still having significant UO - ~1L in 3h this AM.  Strict I/O.  Reassess by uro.  If still having significant UO by noon – recheck BMP.  D/w Uro - electrolytes wnl, pt feeling well, can d/c home f/u uro as outpt.

## 2018-03-04 NOTE — ED CDU PROVIDER SUBSEQUENT DAY NOTE - ATTENDING CONTRIBUTION TO CARE
38F p/w sent in by urologist due to possible post-obstructive diuresis - pt has been passing > 500cc/hr.  Pt with L Hydronephrosis with stent and nephrostomy tube.  Flank pain on L side -> stricture, L ureteroscopy, IR procedure feb 21, perc nephrostomy + stent.  2 D ago, urologist -> acute worsening pain, urine leakage.  (+)600cc of UO / hr.  Placed in CDU for obs of postobstructive diuresis.  PO hydration, titrate to thirst, following electrolytes.  Still having significant UO - ~1L in 3h this AM.  Strict I/O.  Reassess by uro.  If still having significant UO by noon – recheck BMP.  D/w Uro - electrolytes wnl, pt feeling well, can d/c home f/u uro as outpt.    VS:  unremarkable    GEN - NAD; well appearing; A+O x3   HEAD - NC/AT     ENT - PEERL, EOMI, mucous membranes  moist , no discharge      NECK: Neck supple, non-tender without lymphadenopathy, no masses, no JVD  PULM - CTA b/l,  symmetric breath sounds  COR -  normal heart sounds    ABD - , ND, NT, soft, no guarding, no rebound, no masses    BACK - no CVA tenderness, nontender spine   L flank nephrostomy tube c/d/i, draining light yellow clear urine  EXTREMS - no edema, no deformity, warm and well perfused    SKIN - no rash or bruising      NEUROLOGIC - alert, CN 2-12 intact, sensation nl, motor 5/5 RUE/LUE/RLE/LLE.

## 2018-03-04 NOTE — PROGRESS NOTE ADULT - ASSESSMENT
38F h/o L robotic pyeloplasty, recent URS showing distal left ureteral lesion.  Presenting with increased UOP for L NT after uncapping on Friday.    Stable for discharge home.    --PO hydration  --follow up Friday in office for nephrostogram, plan for repeat ureteroscopy as outpatient

## 2018-03-04 NOTE — ED CDU PROVIDER SUBSEQUENT DAY NOTE - PROGRESS NOTE DETAILS
Pt resting comfortably in the interim; no issues thus far.  Will continue to monitor; pt will be signed out to day CDU PA and attending at 0700 hrs. Pt feeling better after CDU stay. Pt seen and examined by Urology, stable for dc with adequate hydration and close outpatient f/u.

## 2018-03-04 NOTE — PROGRESS NOTE ADULT - ATTENDING COMMENTS
Pt seen and examined  agree with above assessment- appears urine volume refluxing up stent and via nephrostomy  f/u in office as planned above for management

## 2018-03-05 DIAGNOSIS — N13.30 UNSPECIFIED HYDRONEPHROSIS: ICD-10-CM

## 2018-03-05 DIAGNOSIS — N13.5 CROSSING VESSEL AND STRICTURE OF URETER WITHOUT HYDRONEPHROSIS: ICD-10-CM

## 2018-03-05 DIAGNOSIS — Q62.11 CONGENITAL OCCLUSION OF URETEROPELVIC JUNCTION: ICD-10-CM

## 2018-03-05 LAB
BACTERIA UR CULT: SIGNIFICANT CHANGE UP
SPECIMEN SOURCE: SIGNIFICANT CHANGE UP

## 2018-03-09 ENCOUNTER — APPOINTMENT (OUTPATIENT)
Dept: UROLOGY | Facility: CLINIC | Age: 38
End: 2018-03-09

## 2018-03-13 ENCOUNTER — OUTPATIENT (OUTPATIENT)
Dept: OUTPATIENT SERVICES | Facility: HOSPITAL | Age: 38
LOS: 1 days | Discharge: HOME | End: 2018-03-13
Payer: MEDICAID

## 2018-03-13 ENCOUNTER — APPOINTMENT (OUTPATIENT)
Dept: UROLOGY | Facility: AMBULATORY SURGERY CENTER | Age: 38
End: 2018-03-13

## 2018-03-13 ENCOUNTER — RESULT REVIEW (OUTPATIENT)
Age: 38
End: 2018-03-13

## 2018-03-13 VITALS
WEIGHT: 173.94 LBS | OXYGEN SATURATION: 100 % | SYSTOLIC BLOOD PRESSURE: 106 MMHG | DIASTOLIC BLOOD PRESSURE: 69 MMHG | RESPIRATION RATE: 16 BRPM | HEIGHT: 63 IN | HEART RATE: 69 BPM | TEMPERATURE: 79 F

## 2018-03-13 VITALS
HEART RATE: 74 BPM | OXYGEN SATURATION: 98 % | SYSTOLIC BLOOD PRESSURE: 102 MMHG | TEMPERATURE: 98 F | DIASTOLIC BLOOD PRESSURE: 67 MMHG | RESPIRATION RATE: 12 BRPM

## 2018-03-13 DIAGNOSIS — N13.30 UNSPECIFIED HYDRONEPHROSIS: Chronic | ICD-10-CM

## 2018-03-13 DIAGNOSIS — N13.30 UNSPECIFIED HYDRONEPHROSIS: ICD-10-CM

## 2018-03-13 DIAGNOSIS — Z87.448 PERSONAL HISTORY OF OTHER DISEASES OF URINARY SYSTEM: Chronic | ICD-10-CM

## 2018-03-13 PROCEDURE — 88305 TISSUE EXAM BY PATHOLOGIST: CPT | Mod: 26

## 2018-03-13 PROCEDURE — 52354 CYSTOURETERO W/BIOPSY: CPT | Mod: LT

## 2018-03-13 PROCEDURE — 52332 CYSTOSCOPY AND TREATMENT: CPT | Mod: LT

## 2018-03-13 RX ORDER — CEPHALEXIN 500 MG
1 CAPSULE ORAL
Qty: 6 | Refills: 0 | OUTPATIENT
Start: 2018-03-13 | End: 2018-03-15

## 2018-03-13 RX ORDER — TAMSULOSIN HYDROCHLORIDE 0.4 MG/1
1 CAPSULE ORAL
Qty: 30 | Refills: 0 | OUTPATIENT
Start: 2018-03-13 | End: 2018-04-11

## 2018-03-13 NOTE — ASU DISCHARGE PLAN (ADULT/PEDIATRIC). - SPECIAL INSTRUCTIONS
Alternate between Tylenol and Advil every 6 hrs if you have pain     Keep nephrostomy clamped. If experiencing sever back pain, fever, chills, nausea, or difficulty urinating please call [phone]

## 2018-03-13 NOTE — BRIEF OPERATIVE NOTE - PROCEDURE
<<-----Click on this checkbox to enter Procedure Ureteroscopy of left ureter with use of laser  03/13/2018    Active  [facility]

## 2018-03-13 NOTE — ASU DISCHARGE PLAN (ADULT/PEDIATRIC). - NOTIFY
Pain not relieved by Medications/Unable to Urinate/Swelling that continues/Fever greater than 101/Persistent Nausea and Vomiting/Inability to Tolerate Liquids or Foods/Bleeding that does not stop

## 2018-03-13 NOTE — BRIEF OPERATIVE NOTE - OPERATION/FINDINGS
L ureteroscopy, L RPG, L balloon dilation of stricture, L basket and biopsy of ureteral mass, L laser fulguration of ureteral mass, L ureteral stent placement

## 2018-03-14 ENCOUNTER — INPATIENT (INPATIENT)
Facility: HOSPITAL | Age: 38
LOS: 4 days | Discharge: HOME | End: 2018-03-19
Attending: UROLOGY | Admitting: UROLOGY
Payer: MEDICAID

## 2018-03-14 VITALS
TEMPERATURE: 102 F | OXYGEN SATURATION: 100 % | SYSTOLIC BLOOD PRESSURE: 112 MMHG | DIASTOLIC BLOOD PRESSURE: 74 MMHG | HEART RATE: 732 BPM | RESPIRATION RATE: 16 BRPM

## 2018-03-14 DIAGNOSIS — N13.30 UNSPECIFIED HYDRONEPHROSIS: Chronic | ICD-10-CM

## 2018-03-14 DIAGNOSIS — Z87.448 PERSONAL HISTORY OF OTHER DISEASES OF URINARY SYSTEM: Chronic | ICD-10-CM

## 2018-03-14 LAB
APPEARANCE UR: CLEAR — SIGNIFICANT CHANGE UP
BACTERIA # UR AUTO: SIGNIFICANT CHANGE UP
BASE EXCESS BLDV CALC-SCNC: 0.7 MMOL/L — SIGNIFICANT CHANGE UP
BASOPHILS # BLD AUTO: 0.03 K/UL — SIGNIFICANT CHANGE UP (ref 0–0.2)
BASOPHILS NFR BLD AUTO: 0.2 % — SIGNIFICANT CHANGE UP (ref 0–2)
BILIRUB UR-MCNC: NEGATIVE — SIGNIFICANT CHANGE UP
BLOOD GAS VENOUS - CREATININE: 0.68 MG/DL — SIGNIFICANT CHANGE UP (ref 0.5–1.3)
BLOOD UR QL VISUAL: HIGH
CHLORIDE BLDV-SCNC: 103 MMOL/L — SIGNIFICANT CHANGE UP (ref 96–108)
COLOR SPEC: SIGNIFICANT CHANGE UP
EOSINOPHIL # BLD AUTO: 0 K/UL — SIGNIFICANT CHANGE UP (ref 0–0.5)
EOSINOPHIL NFR BLD AUTO: 0 % — SIGNIFICANT CHANGE UP (ref 0–6)
GAS PNL BLDV: 132 MMOL/L — LOW (ref 136–146)
GLUCOSE BLDV-MCNC: 126 — HIGH (ref 70–99)
GLUCOSE UR-MCNC: NEGATIVE — SIGNIFICANT CHANGE UP
HCO3 BLDV-SCNC: 25 MMOL/L — SIGNIFICANT CHANGE UP (ref 20–27)
HCT VFR BLD CALC: 32 % — LOW (ref 34.5–45)
HCT VFR BLDV CALC: 34.7 % — SIGNIFICANT CHANGE UP (ref 34.5–45)
HGB BLD-MCNC: 10.3 G/DL — LOW (ref 11.5–15.5)
HGB BLDV-MCNC: 11.3 G/DL — LOW (ref 11.5–15.5)
IMM GRANULOCYTES # BLD AUTO: 0.1 # — SIGNIFICANT CHANGE UP
IMM GRANULOCYTES NFR BLD AUTO: 0.6 % — SIGNIFICANT CHANGE UP (ref 0–1.5)
KETONES UR-MCNC: SIGNIFICANT CHANGE UP
LACTATE BLDV-MCNC: 1.4 MMOL/L — SIGNIFICANT CHANGE UP (ref 0.5–2)
LEUKOCYTE ESTERASE UR-ACNC: HIGH
LYMPHOCYTES # BLD AUTO: 1.41 K/UL — SIGNIFICANT CHANGE UP (ref 1–3.3)
LYMPHOCYTES # BLD AUTO: 8.4 % — LOW (ref 13–44)
MCHC RBC-ENTMCNC: 28.2 PG — SIGNIFICANT CHANGE UP (ref 27–34)
MCHC RBC-ENTMCNC: 32.2 % — SIGNIFICANT CHANGE UP (ref 32–36)
MCV RBC AUTO: 87.7 FL — SIGNIFICANT CHANGE UP (ref 80–100)
MONOCYTES # BLD AUTO: 0.89 K/UL — SIGNIFICANT CHANGE UP (ref 0–0.9)
MONOCYTES NFR BLD AUTO: 5.3 % — SIGNIFICANT CHANGE UP (ref 2–14)
MUCOUS THREADS # UR AUTO: SIGNIFICANT CHANGE UP
NEUTROPHILS # BLD AUTO: 14.33 K/UL — HIGH (ref 1.8–7.4)
NEUTROPHILS NFR BLD AUTO: 85.5 % — HIGH (ref 43–77)
NITRITE UR-MCNC: NEGATIVE — SIGNIFICANT CHANGE UP
NRBC # FLD: 0 — SIGNIFICANT CHANGE UP
PCO2 BLDV: 37 MMHG — LOW (ref 41–51)
PH BLDV: 7.43 PH — SIGNIFICANT CHANGE UP (ref 7.32–7.43)
PH UR: 7.5 — SIGNIFICANT CHANGE UP (ref 4.6–8)
PLATELET # BLD AUTO: 228 K/UL — SIGNIFICANT CHANGE UP (ref 150–400)
PMV BLD: 9.5 FL — SIGNIFICANT CHANGE UP (ref 7–13)
PO2 BLDV: 34 MMHG — LOW (ref 35–40)
POTASSIUM BLDV-SCNC: 3.6 MMOL/L — SIGNIFICANT CHANGE UP (ref 3.4–4.5)
PROT UR-MCNC: 30 MG/DL — HIGH
RBC # BLD: 3.65 M/UL — LOW (ref 3.8–5.2)
RBC # FLD: 13.1 % — SIGNIFICANT CHANGE UP (ref 10.3–14.5)
RBC CASTS # UR COMP ASSIST: SIGNIFICANT CHANGE UP (ref 0–?)
SAO2 % BLDV: 63.4 % — SIGNIFICANT CHANGE UP (ref 60–85)
SP GR SPEC: 1.01 — SIGNIFICANT CHANGE UP (ref 1–1.04)
SQUAMOUS # UR AUTO: SIGNIFICANT CHANGE UP
UROBILINOGEN FLD QL: NORMAL MG/DL — SIGNIFICANT CHANGE UP
WBC # BLD: 16.76 K/UL — HIGH (ref 3.8–10.5)
WBC # FLD AUTO: 16.76 K/UL — HIGH (ref 3.8–10.5)
WBC UR QL: HIGH (ref 0–?)

## 2018-03-14 PROCEDURE — 71045 X-RAY EXAM CHEST 1 VIEW: CPT | Mod: 26

## 2018-03-14 RX ORDER — PIPERACILLIN AND TAZOBACTAM 4; .5 G/20ML; G/20ML
3.38 INJECTION, POWDER, LYOPHILIZED, FOR SOLUTION INTRAVENOUS ONCE
Qty: 0 | Refills: 0 | Status: COMPLETED | OUTPATIENT
Start: 2018-03-14 | End: 2018-03-14

## 2018-03-14 RX ORDER — ACETAMINOPHEN 500 MG
1000 TABLET ORAL ONCE
Qty: 0 | Refills: 0 | Status: COMPLETED | OUTPATIENT
Start: 2018-03-14 | End: 2018-03-14

## 2018-03-14 RX ORDER — SODIUM CHLORIDE 9 MG/ML
2000 INJECTION INTRAMUSCULAR; INTRAVENOUS; SUBCUTANEOUS ONCE
Qty: 0 | Refills: 0 | Status: COMPLETED | OUTPATIENT
Start: 2018-03-14 | End: 2018-03-14

## 2018-03-14 RX ORDER — VANCOMYCIN HCL 1 G
1000 VIAL (EA) INTRAVENOUS ONCE
Qty: 0 | Refills: 0 | Status: COMPLETED | OUTPATIENT
Start: 2018-03-14 | End: 2018-03-15

## 2018-03-14 RX ADMIN — SODIUM CHLORIDE 2000 MILLILITER(S): 9 INJECTION INTRAMUSCULAR; INTRAVENOUS; SUBCUTANEOUS at 23:20

## 2018-03-14 RX ADMIN — Medication 400 MILLIGRAM(S): at 23:20

## 2018-03-14 RX ADMIN — PIPERACILLIN AND TAZOBACTAM 200 GRAM(S): 4; .5 INJECTION, POWDER, LYOPHILIZED, FOR SOLUTION INTRAVENOUS at 23:45

## 2018-03-14 NOTE — ED PROVIDER NOTE - SKIN, MLM
Skin normal color for race, warm, dry and intact. No evidence of rash. no signs of infection around surgical site, - left flank

## 2018-03-14 NOTE — ED PROVIDER NOTE - ATTENDING CONTRIBUTION TO CARE
oc: procedure 2 d prior for ureteral mass: procedure was L ureteroscopy, L RPG, L balloon dilation of stricture, L basket and biopsy of ureteral mass, L laser fulguration of ureteral mass, L ureteral stent placement. Since this am, increasing left lower back pain; fever at home to '107'. painful urination.  Exam: febrile as noted. diffuse tenderness left CVA region. mild bilateral lower abdomen tenderness. no guarding.   Pt started on antibiotics and fluids; urology consulted, requesting US. labs and cultures sent.

## 2018-03-14 NOTE — ED ADULT TRIAGE NOTE - CHIEF COMPLAINT QUOTE
pt c/o fever s/p surgery yesterday. pt states they placed a left nephrostomy tube and stent yesterday, had fevers all day tmax 107.7. reports only taking percocet for pain and fever. no redness at site, dressing CDI pt c/o fever s/p surgery yesterday. pt states they placed a left nephrostomy tube and stent yesterday, had fevers all day today, tmax 107.7. reports only taking percocet for pain and fever. no redness at site, dressing CDI

## 2018-03-15 ENCOUNTER — TRANSCRIPTION ENCOUNTER (OUTPATIENT)
Age: 38
End: 2018-03-15

## 2018-03-15 DIAGNOSIS — R50.9 FEVER, UNSPECIFIED: ICD-10-CM

## 2018-03-15 DIAGNOSIS — N13.5 CROSSING VESSEL AND STRICTURE OF URETER WITHOUT HYDRONEPHROSIS: Chronic | ICD-10-CM

## 2018-03-15 DIAGNOSIS — A41.9 SEPSIS, UNSPECIFIED ORGANISM: ICD-10-CM

## 2018-03-15 LAB
ALBUMIN SERPL ELPH-MCNC: 3.8 G/DL — SIGNIFICANT CHANGE UP (ref 3.3–5)
ALP SERPL-CCNC: 74 U/L — SIGNIFICANT CHANGE UP (ref 40–120)
ALT FLD-CCNC: 18 U/L — SIGNIFICANT CHANGE UP (ref 4–33)
AST SERPL-CCNC: 15 U/L — SIGNIFICANT CHANGE UP (ref 4–32)
BASOPHILS # BLD AUTO: 0.02 K/UL — SIGNIFICANT CHANGE UP (ref 0–0.2)
BASOPHILS NFR BLD AUTO: 0.2 % — SIGNIFICANT CHANGE UP (ref 0–2)
BILIRUB SERPL-MCNC: 0.3 MG/DL — SIGNIFICANT CHANGE UP (ref 0.2–1.2)
BUN SERPL-MCNC: 5 MG/DL — LOW (ref 7–23)
BUN SERPL-MCNC: 8 MG/DL — SIGNIFICANT CHANGE UP (ref 7–23)
BUN SERPL-MCNC: 9 MG/DL — SIGNIFICANT CHANGE UP (ref 7–23)
CALCIUM SERPL-MCNC: 7.4 MG/DL — LOW (ref 8.4–10.5)
CALCIUM SERPL-MCNC: 7.4 MG/DL — LOW (ref 8.4–10.5)
CALCIUM SERPL-MCNC: 8.2 MG/DL — LOW (ref 8.4–10.5)
CHLORIDE SERPL-SCNC: 100 MMOL/L — SIGNIFICANT CHANGE UP (ref 98–107)
CHLORIDE SERPL-SCNC: 108 MMOL/L — HIGH (ref 98–107)
CHLORIDE SERPL-SCNC: 110 MMOL/L — HIGH (ref 98–107)
CO2 SERPL-SCNC: 20 MMOL/L — LOW (ref 22–31)
CO2 SERPL-SCNC: 23 MMOL/L — SIGNIFICANT CHANGE UP (ref 22–31)
CO2 SERPL-SCNC: 24 MMOL/L — SIGNIFICANT CHANGE UP (ref 22–31)
CREAT SERPL-MCNC: 0.64 MG/DL — SIGNIFICANT CHANGE UP (ref 0.5–1.3)
CREAT SERPL-MCNC: 0.68 MG/DL — SIGNIFICANT CHANGE UP (ref 0.5–1.3)
CREAT SERPL-MCNC: 0.73 MG/DL — SIGNIFICANT CHANGE UP (ref 0.5–1.3)
EOSINOPHIL # BLD AUTO: 0 K/UL — SIGNIFICANT CHANGE UP (ref 0–0.5)
EOSINOPHIL NFR BLD AUTO: 0 % — SIGNIFICANT CHANGE UP (ref 0–6)
GLUCOSE SERPL-MCNC: 115 MG/DL — HIGH (ref 70–99)
GLUCOSE SERPL-MCNC: 123 MG/DL — HIGH (ref 70–99)
GLUCOSE SERPL-MCNC: 128 MG/DL — HIGH (ref 70–99)
HCT VFR BLD CALC: 29.3 % — LOW (ref 34.5–45)
HCT VFR BLD CALC: 29.5 % — LOW (ref 34.5–45)
HGB BLD-MCNC: 9.3 G/DL — LOW (ref 11.5–15.5)
HGB BLD-MCNC: 9.7 G/DL — LOW (ref 11.5–15.5)
IMM GRANULOCYTES # BLD AUTO: 0.04 # — SIGNIFICANT CHANGE UP
IMM GRANULOCYTES NFR BLD AUTO: 0.5 % — SIGNIFICANT CHANGE UP (ref 0–1.5)
LYMPHOCYTES # BLD AUTO: 0.65 K/UL — LOW (ref 1–3.3)
LYMPHOCYTES # BLD AUTO: 7.3 % — LOW (ref 13–44)
MCHC RBC-ENTMCNC: 28.4 PG — SIGNIFICANT CHANGE UP (ref 27–34)
MCHC RBC-ENTMCNC: 29.8 PG — SIGNIFICANT CHANGE UP (ref 27–34)
MCHC RBC-ENTMCNC: 31.7 % — LOW (ref 32–36)
MCHC RBC-ENTMCNC: 32.9 % — SIGNIFICANT CHANGE UP (ref 32–36)
MCV RBC AUTO: 89.6 FL — SIGNIFICANT CHANGE UP (ref 80–100)
MCV RBC AUTO: 90.8 FL — SIGNIFICANT CHANGE UP (ref 80–100)
METHOD TYPE: SIGNIFICANT CHANGE UP
MONOCYTES # BLD AUTO: 0.25 K/UL — SIGNIFICANT CHANGE UP (ref 0–0.9)
MONOCYTES NFR BLD AUTO: 2.8 % — SIGNIFICANT CHANGE UP (ref 2–14)
NEUTROPHILS # BLD AUTO: 7.91 K/UL — HIGH (ref 1.8–7.4)
NEUTROPHILS NFR BLD AUTO: 89.2 % — HIGH (ref 43–77)
NRBC # FLD: 0 — SIGNIFICANT CHANGE UP
NRBC # FLD: 0 — SIGNIFICANT CHANGE UP
ORGANISM # SPEC MICROSCOPIC CNT: SIGNIFICANT CHANGE UP
ORGANISM # SPEC MICROSCOPIC CNT: SIGNIFICANT CHANGE UP
PLATELET # BLD AUTO: 158 K/UL — SIGNIFICANT CHANGE UP (ref 150–400)
PLATELET # BLD AUTO: 183 K/UL — SIGNIFICANT CHANGE UP (ref 150–400)
PMV BLD: 9.2 FL — SIGNIFICANT CHANGE UP (ref 7–13)
PMV BLD: 9.9 FL — SIGNIFICANT CHANGE UP (ref 7–13)
POTASSIUM SERPL-MCNC: 3.6 MMOL/L — SIGNIFICANT CHANGE UP (ref 3.5–5.3)
POTASSIUM SERPL-MCNC: 3.7 MMOL/L — SIGNIFICANT CHANGE UP (ref 3.5–5.3)
POTASSIUM SERPL-MCNC: 3.9 MMOL/L — SIGNIFICANT CHANGE UP (ref 3.5–5.3)
POTASSIUM SERPL-SCNC: 3.6 MMOL/L — SIGNIFICANT CHANGE UP (ref 3.5–5.3)
POTASSIUM SERPL-SCNC: 3.7 MMOL/L — SIGNIFICANT CHANGE UP (ref 3.5–5.3)
POTASSIUM SERPL-SCNC: 3.9 MMOL/L — SIGNIFICANT CHANGE UP (ref 3.5–5.3)
PROT SERPL-MCNC: 6.9 G/DL — SIGNIFICANT CHANGE UP (ref 6–8.3)
RBC # BLD: 3.25 M/UL — LOW (ref 3.8–5.2)
RBC # BLD: 3.27 M/UL — LOW (ref 3.8–5.2)
RBC # FLD: 13.3 % — SIGNIFICANT CHANGE UP (ref 10.3–14.5)
RBC # FLD: 13.4 % — SIGNIFICANT CHANGE UP (ref 10.3–14.5)
SODIUM SERPL-SCNC: 136 MMOL/L — SIGNIFICANT CHANGE UP (ref 135–145)
SODIUM SERPL-SCNC: 141 MMOL/L — SIGNIFICANT CHANGE UP (ref 135–145)
SODIUM SERPL-SCNC: 143 MMOL/L — SIGNIFICANT CHANGE UP (ref 135–145)
SPECIMEN SOURCE: SIGNIFICANT CHANGE UP
SPECIMEN SOURCE: SIGNIFICANT CHANGE UP
WBC # BLD: 6.7 K/UL — SIGNIFICANT CHANGE UP (ref 3.8–10.5)
WBC # BLD: 8.87 K/UL — SIGNIFICANT CHANGE UP (ref 3.8–10.5)
WBC # FLD AUTO: 6.7 K/UL — SIGNIFICANT CHANGE UP (ref 3.8–10.5)
WBC # FLD AUTO: 8.87 K/UL — SIGNIFICANT CHANGE UP (ref 3.8–10.5)

## 2018-03-15 PROCEDURE — 99223 1ST HOSP IP/OBS HIGH 75: CPT | Mod: AI

## 2018-03-15 PROCEDURE — 74018 RADEX ABDOMEN 1 VIEW: CPT | Mod: 26

## 2018-03-15 PROCEDURE — 76775 US EXAM ABDO BACK WALL LIM: CPT | Mod: 26

## 2018-03-15 PROCEDURE — 76770 US EXAM ABDO BACK WALL COMP: CPT | Mod: 26

## 2018-03-15 RX ORDER — PIPERACILLIN AND TAZOBACTAM 4; .5 G/20ML; G/20ML
3.38 INJECTION, POWDER, LYOPHILIZED, FOR SOLUTION INTRAVENOUS EVERY 8 HOURS
Qty: 0 | Refills: 0 | Status: DISCONTINUED | OUTPATIENT
Start: 2018-03-15 | End: 2018-03-16

## 2018-03-15 RX ORDER — SENNA PLUS 8.6 MG/1
2 TABLET ORAL AT BEDTIME
Qty: 0 | Refills: 0 | Status: DISCONTINUED | OUTPATIENT
Start: 2018-03-15 | End: 2018-03-19

## 2018-03-15 RX ORDER — OXYCODONE AND ACETAMINOPHEN 5; 325 MG/1; MG/1
2 TABLET ORAL EVERY 6 HOURS
Qty: 0 | Refills: 0 | Status: DISCONTINUED | OUTPATIENT
Start: 2018-03-15 | End: 2018-03-15

## 2018-03-15 RX ORDER — ONDANSETRON 8 MG/1
4 TABLET, FILM COATED ORAL ONCE
Qty: 0 | Refills: 0 | Status: COMPLETED | OUTPATIENT
Start: 2018-03-15 | End: 2018-03-15

## 2018-03-15 RX ORDER — SODIUM CHLORIDE 9 MG/ML
1000 INJECTION INTRAMUSCULAR; INTRAVENOUS; SUBCUTANEOUS ONCE
Qty: 0 | Refills: 0 | Status: COMPLETED | OUTPATIENT
Start: 2018-03-15 | End: 2018-03-16

## 2018-03-15 RX ORDER — SODIUM CHLORIDE 9 MG/ML
1000 INJECTION INTRAMUSCULAR; INTRAVENOUS; SUBCUTANEOUS ONCE
Qty: 0 | Refills: 0 | Status: COMPLETED | OUTPATIENT
Start: 2018-03-15 | End: 2018-03-15

## 2018-03-15 RX ORDER — IBUPROFEN 200 MG
400 TABLET ORAL EVERY 6 HOURS
Qty: 0 | Refills: 0 | Status: DISCONTINUED | OUTPATIENT
Start: 2018-03-15 | End: 2018-03-15

## 2018-03-15 RX ORDER — TAMSULOSIN HYDROCHLORIDE 0.4 MG/1
0.4 CAPSULE ORAL AT BEDTIME
Qty: 0 | Refills: 0 | Status: DISCONTINUED | OUTPATIENT
Start: 2018-03-15 | End: 2018-03-19

## 2018-03-15 RX ORDER — OXYCODONE AND ACETAMINOPHEN 5; 325 MG/1; MG/1
1 TABLET ORAL EVERY 4 HOURS
Qty: 0 | Refills: 0 | Status: DISCONTINUED | OUTPATIENT
Start: 2018-03-15 | End: 2018-03-15

## 2018-03-15 RX ORDER — DOCUSATE SODIUM 100 MG
100 CAPSULE ORAL DAILY
Qty: 0 | Refills: 0 | Status: DISCONTINUED | OUTPATIENT
Start: 2018-03-15 | End: 2018-03-19

## 2018-03-15 RX ORDER — KETOROLAC TROMETHAMINE 30 MG/ML
15 SYRINGE (ML) INJECTION EVERY 6 HOURS
Qty: 0 | Refills: 0 | Status: DISCONTINUED | OUTPATIENT
Start: 2018-03-15 | End: 2018-03-16

## 2018-03-15 RX ORDER — VANCOMYCIN HCL 1 G
1000 VIAL (EA) INTRAVENOUS EVERY 12 HOURS
Qty: 0 | Refills: 0 | Status: DISCONTINUED | OUTPATIENT
Start: 2018-03-15 | End: 2018-03-16

## 2018-03-15 RX ORDER — SODIUM CHLORIDE 9 MG/ML
1000 INJECTION, SOLUTION INTRAVENOUS
Qty: 0 | Refills: 0 | Status: DISCONTINUED | OUTPATIENT
Start: 2018-03-15 | End: 2018-03-15

## 2018-03-15 RX ORDER — HEPARIN SODIUM 5000 [USP'U]/ML
5000 INJECTION INTRAVENOUS; SUBCUTANEOUS EVERY 12 HOURS
Qty: 0 | Refills: 0 | Status: DISCONTINUED | OUTPATIENT
Start: 2018-03-15 | End: 2018-03-19

## 2018-03-15 RX ORDER — OXYCODONE HYDROCHLORIDE 5 MG/1
5 TABLET ORAL EVERY 4 HOURS
Qty: 0 | Refills: 0 | Status: DISCONTINUED | OUTPATIENT
Start: 2018-03-15 | End: 2018-03-19

## 2018-03-15 RX ORDER — ACETAMINOPHEN 500 MG
650 TABLET ORAL EVERY 6 HOURS
Qty: 0 | Refills: 0 | Status: DISCONTINUED | OUTPATIENT
Start: 2018-03-15 | End: 2018-03-16

## 2018-03-15 RX ORDER — ACETAMINOPHEN 500 MG
1000 TABLET ORAL ONCE
Qty: 0 | Refills: 0 | Status: COMPLETED | OUTPATIENT
Start: 2018-03-15 | End: 2018-03-15

## 2018-03-15 RX ORDER — SODIUM CHLORIDE 9 MG/ML
1000 INJECTION, SOLUTION INTRAVENOUS
Qty: 0 | Refills: 0 | Status: DISCONTINUED | OUTPATIENT
Start: 2018-03-15 | End: 2018-03-17

## 2018-03-15 RX ORDER — OXYCODONE HYDROCHLORIDE 5 MG/1
10 TABLET ORAL EVERY 4 HOURS
Qty: 0 | Refills: 0 | Status: DISCONTINUED | OUTPATIENT
Start: 2018-03-15 | End: 2018-03-19

## 2018-03-15 RX ADMIN — PIPERACILLIN AND TAZOBACTAM 25 GRAM(S): 4; .5 INJECTION, POWDER, LYOPHILIZED, FOR SOLUTION INTRAVENOUS at 16:25

## 2018-03-15 RX ADMIN — Medication 100 MILLIGRAM(S): at 12:14

## 2018-03-15 RX ADMIN — SODIUM CHLORIDE 1000 MILLILITER(S): 9 INJECTION INTRAMUSCULAR; INTRAVENOUS; SUBCUTANEOUS at 02:46

## 2018-03-15 RX ADMIN — SODIUM CHLORIDE 75 MILLILITER(S): 9 INJECTION, SOLUTION INTRAVENOUS at 22:31

## 2018-03-15 RX ADMIN — Medication 650 MILLIGRAM(S): at 04:48

## 2018-03-15 RX ADMIN — PIPERACILLIN AND TAZOBACTAM 25 GRAM(S): 4; .5 INJECTION, POWDER, LYOPHILIZED, FOR SOLUTION INTRAVENOUS at 07:26

## 2018-03-15 RX ADMIN — HEPARIN SODIUM 5000 UNIT(S): 5000 INJECTION INTRAVENOUS; SUBCUTANEOUS at 04:56

## 2018-03-15 RX ADMIN — TAMSULOSIN HYDROCHLORIDE 0.4 MILLIGRAM(S): 0.4 CAPSULE ORAL at 22:31

## 2018-03-15 RX ADMIN — Medication 15 MILLIGRAM(S): at 17:48

## 2018-03-15 RX ADMIN — Medication 15 MILLIGRAM(S): at 12:14

## 2018-03-15 RX ADMIN — HEPARIN SODIUM 5000 UNIT(S): 5000 INJECTION INTRAVENOUS; SUBCUTANEOUS at 17:48

## 2018-03-15 RX ADMIN — Medication 250 MILLIGRAM(S): at 00:50

## 2018-03-15 RX ADMIN — Medication 250 MILLIGRAM(S): at 14:09

## 2018-03-15 RX ADMIN — SODIUM CHLORIDE 1000 MILLILITER(S): 9 INJECTION INTRAMUSCULAR; INTRAVENOUS; SUBCUTANEOUS at 06:06

## 2018-03-15 RX ADMIN — ONDANSETRON 4 MILLIGRAM(S): 8 TABLET, FILM COATED ORAL at 16:47

## 2018-03-15 RX ADMIN — SENNA PLUS 2 TABLET(S): 8.6 TABLET ORAL at 22:31

## 2018-03-15 RX ADMIN — SODIUM CHLORIDE 125 MILLILITER(S): 9 INJECTION, SOLUTION INTRAVENOUS at 02:20

## 2018-03-15 RX ADMIN — OXYCODONE AND ACETAMINOPHEN 2 TABLET(S): 5; 325 TABLET ORAL at 03:18

## 2018-03-15 RX ADMIN — SODIUM CHLORIDE 75 MILLILITER(S): 9 INJECTION, SOLUTION INTRAVENOUS at 16:38

## 2018-03-15 RX ADMIN — Medication 15 MILLIGRAM(S): at 07:57

## 2018-03-15 RX ADMIN — Medication 400 MILLIGRAM(S): at 16:47

## 2018-03-15 NOTE — H&P ADULT - HISTORY OF PRESENT ILLNESS
39yo F with hx of Left hydronephrosis s/p Robotic Left pyeloplasty one year ago recently underwent a left ureteroscopy, biopsy of ureteral mass yesterday presents to [facility] ED with 1 day of fever, chills, and abdominal pain. She endorses that her temperature was as "high as 107.0" at home. Has associated left flank pain, dysuria, and some bloody urine. Denies CP, SOB, diplopia, vomiting, and or being around sick contacts.    Of note, she previously had a Percutaneous Nephrostomy tube placed in 2/2018 for severe hydro. Due to the severe hydro yesterday she underwent a repeat ureteroscopy, Left balloon dilation of the stricture, Left basket and biopsy of a ureteral mass, and left ureteral stent placement. 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   In ED: Tm/c: 102.4, BP: 112/74, HR: 130, 02: 100% RA. Given 1L NS bolus, Blood and Urine Cx’s were obtained. Zosyn IVSS/ Vanco 1gm IVSS x1 dose given. Left Perc in place and capped. Uncapped PCN- sent Cx and placed to drainage bag.

## 2018-03-15 NOTE — H&P ADULT - ASSESSMENT
37yo F with hx of hydronephrosis s/p Robotic Left pyeloplasty developed ureteral stricture disease is POD #1 from Left Ureteroscopy, RPG, Left balloon dilation of stricture, biopsy of ureteral mass, Left stent with elevated temperature concerning for sepsis.

## 2018-03-15 NOTE — ED ADULT NURSE REASSESSMENT NOTE - NS ED NURSE REASSESS COMMENT FT1
report received from CHAD jordan, pt noted resting comfortable with family at bedside, pt revitaled as noted. pt responding well to fluid bolus to help bring up her BP. pt states mild pain at surgical site. pt respirations are even and unlabored and pt is in NAD. pt report received for 909a by CHAD Malcolm. all of RN questions were answered and pt put in fro transport.

## 2018-03-15 NOTE — PROGRESS NOTE ADULT - SUBJECTIVE AND OBJECTIVE BOX
Overnight events:  Pt continues to be febrile this AM with stable BP, received 1liter bolus overnight for soft BP    Subjective:  Through  pt c/o LLQ to left flank pain, dysuria, but able to void to completion, no N/V    Objective:    Vital signs  T(C): , Max: 39.6 (03-15-18 @ 07:55)  HR: 120 (03-15-18 @ 07:55)  BP: 127/61 (03-15-18 @ 07:55)  SpO2: 98% (03-15-18 @ 07:55)  Wt(kg): --    Output   Void: NR  L NT: 550      Gen: NAD  Abd: soft, tender LLQ, no rebound or guarding, + left CVAT      Labs                        9.3    8.87  )-----------( 183      ( 15 Mar 2018 05:55 )             29.3     15 Mar 2018 05:55    141    |  108    |  8      ----------------------------<  115    3.7     |  20     |  0.68     Ca    7.4        15 Mar 2018 05:55        Urine Cx: P  Blood Cx: P    Imaging: P Overnight events:  Pt continues to be febrile this AM with stable BP, received 1liter bolus overnight for soft BP    Subjective:  Through  pt c/o LLQ to left flank pain, dysuria, but able to void to completion, no N/V    Objective:    Vital signs  T(C): , Max: 39.6 (03-15-18 @ 07:55)  HR: 120 (03-15-18 @ 07:55)  BP: 127/61 (03-15-18 @ 07:55)  SpO2: 98% (03-15-18 @ 07:55)  Wt(kg): --    Output   Void: NR  L NT: 550      Gen: NAD  Abd: soft, tender LLQ, no rebound or guarding, + left CVAT, NT dressing c/d/i      Labs                        9.3    8.87  )-----------( 183      ( 15 Mar 2018 05:55 )             29.3     15 Mar 2018 05:55    141    |  108    |  8      ----------------------------<  115    3.7     |  20     |  0.68     Ca    7.4        15 Mar 2018 05:55        Urine Cx: P  Blood Cx: P    Imaging: P

## 2018-03-15 NOTE — PROGRESS NOTE ADULT - ASSESSMENT
39 yo F POD #1 L URS, ureteral biopsy, balloon dilation, ureteral stent placement, capped L NT for left hydro/ureteral stricture admitted with fever, L NT uncapped

## 2018-03-15 NOTE — H&P ADULT - NSHPPHYSICALEXAM_GEN_ALL_CORE
Vital Signs Last 24 Hrs  T(C): 39.1 (14 Mar 2018 20:19), Max: 39.1 (14 Mar 2018 20:19)  T(F): 102.4 (14 Mar 2018 20:19), Max: 102.4 (14 Mar 2018 20:19)  HR: 132 (14 Mar 2018 20:19) (132 - 732)  BP: 112/74 (14 Mar 2018 20:19) (112/74 - 112/74)  BP(mean): --  RR: 16 (14 Mar 2018 20:19) (16 - 16)  SpO2: 100% (14 Mar 2018 20:19) (100% - 100%)    Gen: AAOx3, NAD  Lungs: CTA b/l. No w/r/r  CV: sinus tachy  Abd: soft, +BS, diffuse abdominal tenderness. + Left flank tenderness. Left flank with PCN in place- capped.   : uncapped the Left PCN- draining straw-colored urine into bag  Ext: no edema b/l. 2+ DP  Neuro: no defecits CN II-XII

## 2018-03-15 NOTE — PROVIDER CONTACT NOTE (MEDICATION) - ASSESSMENT
Pt. w/ elevated temp 102.8, tachycardic in 120's..  BP & O2 stable. Pt. reports "chills." No pain otherwise noted.

## 2018-03-15 NOTE — PROVIDER CONTACT NOTE (MEDICATION) - ACTION/TREATMENT ORDERED:
Tylenol & Bolus provided as ordered after temp of 102.8  Temp continued at 103.2.   Toradol IV push administered as ordered.  Change of shift, temp at 99.0 F. Pt. reports relief.

## 2018-03-15 NOTE — H&P ADULT - PSH
Acquired hydronephrosis  left pyeloplasty 6/2017  Acquired ureteral stricture    History of nephrostomy  (Left percutaneous nephrostomy and ureteral stent placed 2/21/18)

## 2018-03-15 NOTE — ED ADULT NURSE NOTE - CHIEF COMPLAINT QUOTE
pt c/o fever s/p surgery yesterday. pt states they placed a left nephrostomy tube and stent yesterday, had fevers all day today, tmax 107.7. reports only taking percocet for pain and fever. no redness at site, dressing CDI

## 2018-03-15 NOTE — H&P ADULT - NSHPREVIEWOFSYSTEMS_GEN_ALL_CORE
Constitutional: +fever, chills  Respiratory: negative  Cardiovascular: negative  Gastroenterology: +nausea, abdominal pain  Genitourinary: burning on urination, bloody urine  MSK: negative  Neuro: +headache

## 2018-03-15 NOTE — PROGRESS NOTE ADULT - PROBLEM SELECTOR PLAN 1
Continue NT to drainage  Continue zosyn/vanco  Monitor VS  Standing toradol  AM labs reviewed  F/U cultures  F/U official U/S, KUB reports  DVT prophy, IS  OOB, ambulate

## 2018-03-16 LAB
BUN SERPL-MCNC: 4 MG/DL — LOW (ref 7–23)
CALCIUM SERPL-MCNC: 7.4 MG/DL — LOW (ref 8.4–10.5)
CHLORIDE SERPL-SCNC: 107 MMOL/L — SIGNIFICANT CHANGE UP (ref 98–107)
CO2 SERPL-SCNC: 20 MMOL/L — LOW (ref 22–31)
CREAT SERPL-MCNC: 0.64 MG/DL — SIGNIFICANT CHANGE UP (ref 0.5–1.3)
GLUCOSE SERPL-MCNC: 113 MG/DL — HIGH (ref 70–99)
HCG UR-SCNC: NEGATIVE — SIGNIFICANT CHANGE UP
HCT VFR BLD CALC: 27.2 % — LOW (ref 34.5–45)
HGB BLD-MCNC: 9 G/DL — LOW (ref 11.5–15.5)
MCHC RBC-ENTMCNC: 29.9 PG — SIGNIFICANT CHANGE UP (ref 27–34)
MCHC RBC-ENTMCNC: 33.1 % — SIGNIFICANT CHANGE UP (ref 32–36)
MCV RBC AUTO: 90.4 FL — SIGNIFICANT CHANGE UP (ref 80–100)
NRBC # FLD: 0 — SIGNIFICANT CHANGE UP
ORGANISM # SPEC MICROSCOPIC CNT: SIGNIFICANT CHANGE UP
PLATELET # BLD AUTO: 152 K/UL — SIGNIFICANT CHANGE UP (ref 150–400)
PMV BLD: 9.9 FL — SIGNIFICANT CHANGE UP (ref 7–13)
POTASSIUM SERPL-MCNC: 3.4 MMOL/L — LOW (ref 3.5–5.3)
POTASSIUM SERPL-SCNC: 3.4 MMOL/L — LOW (ref 3.5–5.3)
RBC # BLD: 3.01 M/UL — LOW (ref 3.8–5.2)
RBC # FLD: 13.3 % — SIGNIFICANT CHANGE UP (ref 10.3–14.5)
SODIUM SERPL-SCNC: 139 MMOL/L — SIGNIFICANT CHANGE UP (ref 135–145)
SP GR UR: 1 — LOW (ref 1–1.03)
SPECIMEN SOURCE: SIGNIFICANT CHANGE UP
SPECIMEN SOURCE: SIGNIFICANT CHANGE UP
WBC # BLD: 5.33 K/UL — SIGNIFICANT CHANGE UP (ref 3.8–10.5)
WBC # FLD AUTO: 5.33 K/UL — SIGNIFICANT CHANGE UP (ref 3.8–10.5)

## 2018-03-16 PROCEDURE — 99232 SBSQ HOSP IP/OBS MODERATE 35: CPT

## 2018-03-16 PROCEDURE — 74177 CT ABD & PELVIS W/CONTRAST: CPT | Mod: 26

## 2018-03-16 RX ORDER — MEROPENEM 1 G/30ML
2000 INJECTION INTRAVENOUS ONCE
Qty: 0 | Refills: 0 | Status: COMPLETED | OUTPATIENT
Start: 2018-03-16 | End: 2018-03-16

## 2018-03-16 RX ORDER — ACETAMINOPHEN 500 MG
500 TABLET ORAL EVERY 4 HOURS
Qty: 0 | Refills: 0 | Status: DISCONTINUED | OUTPATIENT
Start: 2018-03-16 | End: 2018-03-16

## 2018-03-16 RX ORDER — ACETAMINOPHEN 500 MG
650 TABLET ORAL EVERY 6 HOURS
Qty: 0 | Refills: 0 | Status: DISCONTINUED | OUTPATIENT
Start: 2018-03-16 | End: 2018-03-19

## 2018-03-16 RX ORDER — ONDANSETRON 8 MG/1
4 TABLET, FILM COATED ORAL EVERY 6 HOURS
Qty: 0 | Refills: 0 | Status: DISCONTINUED | OUTPATIENT
Start: 2018-03-16 | End: 2018-03-19

## 2018-03-16 RX ORDER — ACETAMINOPHEN 500 MG
1000 TABLET ORAL ONCE
Qty: 0 | Refills: 0 | Status: COMPLETED | OUTPATIENT
Start: 2018-03-16 | End: 2018-03-16

## 2018-03-16 RX ORDER — MEROPENEM 1 G/30ML
2000 INJECTION INTRAVENOUS EVERY 8 HOURS
Qty: 0 | Refills: 0 | Status: COMPLETED | OUTPATIENT
Start: 2018-03-16 | End: 2018-03-18

## 2018-03-16 RX ORDER — POTASSIUM CHLORIDE 20 MEQ
40 PACKET (EA) ORAL EVERY 4 HOURS
Qty: 0 | Refills: 0 | Status: COMPLETED | OUTPATIENT
Start: 2018-03-16 | End: 2018-03-16

## 2018-03-16 RX ORDER — MEROPENEM 1 G/30ML
INJECTION INTRAVENOUS
Qty: 0 | Refills: 0 | Status: COMPLETED | OUTPATIENT
Start: 2018-03-16 | End: 2018-03-18

## 2018-03-16 RX ADMIN — TAMSULOSIN HYDROCHLORIDE 0.4 MILLIGRAM(S): 0.4 CAPSULE ORAL at 21:58

## 2018-03-16 RX ADMIN — MEROPENEM 200 MILLIGRAM(S): 1 INJECTION INTRAVENOUS at 21:58

## 2018-03-16 RX ADMIN — Medication 40 MILLIEQUIVALENT(S): at 20:03

## 2018-03-16 RX ADMIN — SODIUM CHLORIDE 1000 MILLILITER(S): 9 INJECTION INTRAMUSCULAR; INTRAVENOUS; SUBCUTANEOUS at 00:04

## 2018-03-16 RX ADMIN — Medication 100 MILLIGRAM(S): at 12:17

## 2018-03-16 RX ADMIN — Medication 650 MILLIGRAM(S): at 18:22

## 2018-03-16 RX ADMIN — Medication 40 MILLIEQUIVALENT(S): at 12:17

## 2018-03-16 RX ADMIN — MEROPENEM 200 MILLIGRAM(S): 1 INJECTION INTRAVENOUS at 07:28

## 2018-03-16 RX ADMIN — HEPARIN SODIUM 5000 UNIT(S): 5000 INJECTION INTRAVENOUS; SUBCUTANEOUS at 18:22

## 2018-03-16 RX ADMIN — Medication 15 MILLIGRAM(S): at 05:35

## 2018-03-16 RX ADMIN — Medication 15 MILLIGRAM(S): at 00:04

## 2018-03-16 RX ADMIN — Medication 250 MILLIGRAM(S): at 02:31

## 2018-03-16 RX ADMIN — HEPARIN SODIUM 5000 UNIT(S): 5000 INJECTION INTRAVENOUS; SUBCUTANEOUS at 05:35

## 2018-03-16 RX ADMIN — ONDANSETRON 4 MILLIGRAM(S): 8 TABLET, FILM COATED ORAL at 18:41

## 2018-03-16 RX ADMIN — PIPERACILLIN AND TAZOBACTAM 25 GRAM(S): 4; .5 INJECTION, POWDER, LYOPHILIZED, FOR SOLUTION INTRAVENOUS at 00:04

## 2018-03-16 RX ADMIN — Medication 15 MILLIGRAM(S): at 12:17

## 2018-03-16 RX ADMIN — MEROPENEM 200 MILLIGRAM(S): 1 INJECTION INTRAVENOUS at 14:43

## 2018-03-16 RX ADMIN — Medication 400 MILLIGRAM(S): at 05:34

## 2018-03-16 RX ADMIN — SENNA PLUS 2 TABLET(S): 8.6 TABLET ORAL at 21:58

## 2018-03-16 NOTE — PROVIDER CONTACT NOTE (CRITICAL VALUE NOTIFICATION) - BACKGROUND
S/P nephrostomy 3/13
Pt. s/p L ureteroscopy, L NT placement for ureteral stricture and hydronephrosis.

## 2018-03-16 NOTE — PROVIDER CONTACT NOTE (CRITICAL VALUE NOTIFICATION) - ACTION/TREATMENT ORDERED:
Continue to monitor
PA notified. Pt. getting zosyn q8h, vanco q12h, 1L NS ordered for low BP. Cont to monitor v/s.

## 2018-03-16 NOTE — PROVIDER CONTACT NOTE (OTHER) - ACTION/TREATMENT ORDERED:
PA notified, blood cx, urine cx, IV tylenol 1G to be ordered. Will reassess v/s and continue to monitor.
Tylenol and CT of ABD
Tylenol and hypothermia blanket, toradol IVP

## 2018-03-16 NOTE — PROVIDER CONTACT NOTE (CRITICAL VALUE NOTIFICATION) - ASSESSMENT
febrile, IV tylenol and toradol given. Hr elevated.
Pt. A&Ox4, Temp 36.7 oral, HR 76, BP 95/49, O2 99% RA.

## 2018-03-16 NOTE — PROGRESS NOTE ADULT - PROBLEM SELECTOR PLAN 1
Continue NT to drainage  Meropenem  Monitor VS  Standing toradol, IV tylenol as needed  AM labs reviewed  F/U cultures  DVT prophy, IS  OOB, ambulate Continue NT to drainage  Meropenem  Monitor VS  Continue collazo  Standing toradol, IV tylenol as needed  AM labs reviewed  F/U cultures  DVT prophy, IS  OOB, ambulate Continue NT to drainage  Meropenem  Monitor VS  Continue collazo  Standing toradol, IV tylenol as needed  AM labs reviewed  Repeat blood and urine cultures ordered  F/U cultures  DVT prophy, IS  OOB, ambulate

## 2018-03-16 NOTE — PROGRESS NOTE ADULT - SUBJECTIVE AND OBJECTIVE BOX
Overnight events:  Pt continued to be febrile with stable BP, T max 104.5, last fever 101.1 at 4am, currently 99.5, no change in MS    Subjective:  Pt states she feels okay this am, no N/V, no chills, still with mild abdominal/flank pain    Objective:    Vital signs  T(C): , Max: 40.3 (03-15-18 @ 16:33)  HR: 99 (03-16-18 @ 06:38)  BP: 124/65 (03-16-18 @ 04:44)  SpO2: 97% (03-16-18 @ 04:44)  Wt(kg): --    Output   Collazo: 100  L NT: 2400        Gen: NAD  Abd: soft, minimal tenderness, no guarding or rebound  : collazo secured    Labs                        9.0    5.33  )-----------( 152      ( 16 Mar 2018 07:21 )             27.2     16 Mar 2018 07:21    139    |  107    |  4      ----------------------------<  113    3.4     |  20     |  0.64     Ca    7.4        16 Mar 2018 07:21        Urine Cx: P     Blood Cx:   Culture - Blood (03.15.18 @ 00:39)    Culture - Blood:       -  Serratia marcescens: + DETECT MICHELA    Specimen Source: BLOOD PERIPHERAL    Organism: BLOOD CULTURE PCR    Gram Stain Blood:   ***** CRITICAL RESULT *****  PERSON CALLED / READ-BACK: KIN NORTH RN / Y  DATE / TIME CALLED: 03/15/18 1701  CALLED BY: RUDY WAGNER                *******************************                * This is an appended result. *  *******************************  A prior result that was reported as final has been changed.  GNR^Gram Neg Rods  AFTER: 14 HOURS INCUBATION  BOTTLE: AEROBIC   ANAEROBIC BOTTLES    Organism Identification: BLOOD CULTURE PCR    Method Type: PCR    Imaging:  < from: US Renal (03.15.18 @ 00:53) >  IMPRESSION:     Moderate hydronephrosis of the left kidney. Ureteral stent is identified  proximally in the left renal pelvis and distally in the urinary bladder.    < end of copied text > Overnight events:  Pt continued to be febrile with stable BP, T max 104.5, last fever 101.1 at 4am, currently 99.5, no change in MS, collazo placed overnight for maximal drainage    Subjective:  Pt states she feels okay this am, no N/V, no chills, still with mild abdominal/flank pain    Objective:    Vital signs  T(C): , Max: 40.3 (03-15-18 @ 16:33)  HR: 99 (03-16-18 @ 06:38)  BP: 124/65 (03-16-18 @ 04:44)  SpO2: 97% (03-16-18 @ 04:44)  Wt(kg): --    Output   Collazo: 100  L NT: 2400        Gen: NAD  Abd: soft, minimal tenderness, no guarding or rebound, L NT dressing c/d/i, draining yellow urine  : collazo secured    Labs                        9.0    5.33  )-----------( 152      ( 16 Mar 2018 07:21 )             27.2     16 Mar 2018 07:21    139    |  107    |  4      ----------------------------<  113    3.4     |  20     |  0.64     Ca    7.4        16 Mar 2018 07:21        Urine Cx: P     Blood Cx:   Culture - Blood (03.15.18 @ 00:39)    Culture - Blood:       -  Serratia marcescens: + DETECT MICHELA    Specimen Source: BLOOD PERIPHERAL    Organism: BLOOD CULTURE PCR    Gram Stain Blood:   ***** CRITICAL RESULT *****  PERSON CALLED / READ-BACK: KIN NORTH RN / Y  DATE / TIME CALLED: 03/15/18 1701  CALLED BY: RUDY WAGNER                *******************************                * This is an appended result. *  *******************************  A prior result that was reported as final has been changed.  GNR^Gram Neg Rods  AFTER: 14 HOURS INCUBATION  BOTTLE: AEROBIC   ANAEROBIC BOTTLES    Organism Identification: BLOOD CULTURE PCR    Method Type: PCR    Imaging:  < from: US Renal (03.15.18 @ 00:53) >  IMPRESSION:     Moderate hydronephrosis of the left kidney. Ureteral stent is identified  proximally in the left renal pelvis and distally in the urinary bladder.    < end of copied text >

## 2018-03-16 NOTE — PROGRESS NOTE ADULT - ASSESSMENT
37 yo F POD #2 L URS, ureteral biopsy, balloon dilation, ureteral stent placement, capped L NT for left hydro/ureteral stricture admitted with fever, L NT uncapped, continued fevers with stable BP, normal MS, Bcx PCR + serratia, vanco d/c and meropenem started.

## 2018-03-16 NOTE — PROGRESS NOTE ADULT - ATTENDING COMMENTS
Agree with note as written above. Patient spiked fevers again x2. Currently resting comfortably with no complaints    Abd: soft, nondistended, some LLQ pain, nephrostomy tube draining light pink urine    A/P s/p left ureteral stricture dilation and mass ablation, admitted for fever    - FU sensitivities for culture  - Place collazo catheter for max drainage  - Continue to monitor closely.
Agree with note as stated above. Currently with minimal complaints. States that every time she has a febrile episode, feels more tired afterwards.    Abd: soft, nt/nd, nephrostomy tube draining clear yellow urine  : Flores draining yellow urine    A/P s/p left ureteral stricture dilation and mass ablation    - Continue meropenem  - COntinue close monitoring  - Follow-up sensitivities

## 2018-03-17 LAB
-  AMIKACIN: SIGNIFICANT CHANGE UP
-  AMPICILLIN/SULBACTAM: SIGNIFICANT CHANGE UP
-  AMPICILLIN: SIGNIFICANT CHANGE UP
-  AZTREONAM: SIGNIFICANT CHANGE UP
-  CEFAZOLIN: SIGNIFICANT CHANGE UP
-  CEFEPIME: SIGNIFICANT CHANGE UP
-  CEFOXITIN: SIGNIFICANT CHANGE UP
-  CEFTAZIDIME: SIGNIFICANT CHANGE UP
-  CEFTRIAXONE: SIGNIFICANT CHANGE UP
-  CIPROFLOXACIN: SIGNIFICANT CHANGE UP
-  ERTAPENEM: SIGNIFICANT CHANGE UP
-  GENTAMICIN: SIGNIFICANT CHANGE UP
-  IMIPENEM: SIGNIFICANT CHANGE UP
-  LEVOFLOXACIN: SIGNIFICANT CHANGE UP
-  MEROPENEM: SIGNIFICANT CHANGE UP
-  NITROFURANTOIN: SIGNIFICANT CHANGE UP
-  NITROFURANTOIN: SIGNIFICANT CHANGE UP
-  PIPERACILLIN/TAZOBACTAM: SIGNIFICANT CHANGE UP
-  TIGECYCLINE: SIGNIFICANT CHANGE UP
-  TOBRAMYCIN: SIGNIFICANT CHANGE UP
-  TRIMETHOPRIM/SULFAMETHOXAZOLE: SIGNIFICANT CHANGE UP
BACTERIA BLD CULT: SIGNIFICANT CHANGE UP
BACTERIA BLD CULT: SIGNIFICANT CHANGE UP
BACTERIA UR CULT: SIGNIFICANT CHANGE UP
HCT VFR BLD CALC: 28.2 % — LOW (ref 34.5–45)
HGB BLD-MCNC: 8.8 G/DL — LOW (ref 11.5–15.5)
MCHC RBC-ENTMCNC: 28.2 PG — SIGNIFICANT CHANGE UP (ref 27–34)
MCHC RBC-ENTMCNC: 31.2 % — LOW (ref 32–36)
MCV RBC AUTO: 90.4 FL — SIGNIFICANT CHANGE UP (ref 80–100)
METHOD TYPE: SIGNIFICANT CHANGE UP
NRBC # FLD: 0 — SIGNIFICANT CHANGE UP
ORGANISM # SPEC MICROSCOPIC CNT: SIGNIFICANT CHANGE UP
PLATELET # BLD AUTO: 171 K/UL — SIGNIFICANT CHANGE UP (ref 150–400)
PMV BLD: 9.7 FL — SIGNIFICANT CHANGE UP (ref 7–13)
RBC # BLD: 3.12 M/UL — LOW (ref 3.8–5.2)
RBC # FLD: 13.4 % — SIGNIFICANT CHANGE UP (ref 10.3–14.5)
S MARCESCENS DNA BLD POS NAA+NON-PROBE: SIGNIFICANT CHANGE UP
SPECIMEN SOURCE: SIGNIFICANT CHANGE UP
WBC # BLD: 4.72 K/UL — SIGNIFICANT CHANGE UP (ref 3.8–10.5)
WBC # FLD AUTO: 4.72 K/UL — SIGNIFICANT CHANGE UP (ref 3.8–10.5)

## 2018-03-17 PROCEDURE — 99232 SBSQ HOSP IP/OBS MODERATE 35: CPT

## 2018-03-17 RX ADMIN — MEROPENEM 200 MILLIGRAM(S): 1 INJECTION INTRAVENOUS at 05:46

## 2018-03-17 RX ADMIN — Medication 650 MILLIGRAM(S): at 18:16

## 2018-03-17 RX ADMIN — HEPARIN SODIUM 5000 UNIT(S): 5000 INJECTION INTRAVENOUS; SUBCUTANEOUS at 05:46

## 2018-03-17 RX ADMIN — Medication 650 MILLIGRAM(S): at 01:43

## 2018-03-17 RX ADMIN — MEROPENEM 200 MILLIGRAM(S): 1 INJECTION INTRAVENOUS at 22:18

## 2018-03-17 RX ADMIN — MEROPENEM 200 MILLIGRAM(S): 1 INJECTION INTRAVENOUS at 13:47

## 2018-03-17 RX ADMIN — Medication 650 MILLIGRAM(S): at 09:42

## 2018-03-17 RX ADMIN — Medication 100 MILLIGRAM(S): at 11:54

## 2018-03-17 RX ADMIN — HEPARIN SODIUM 5000 UNIT(S): 5000 INJECTION INTRAVENOUS; SUBCUTANEOUS at 18:16

## 2018-03-17 RX ADMIN — TAMSULOSIN HYDROCHLORIDE 0.4 MILLIGRAM(S): 0.4 CAPSULE ORAL at 22:19

## 2018-03-17 RX ADMIN — SODIUM CHLORIDE 75 MILLILITER(S): 9 INJECTION, SOLUTION INTRAVENOUS at 11:54

## 2018-03-17 NOTE — PROGRESS NOTE ADULT - ASSESSMENT
37 yo F POD #3 L URS, ureteral biopsy, balloon dilation, ureteral stent placement, capped L NT for left hydro/ureteral stricture admitted with fever, L NT uncapped, continued fevers with stable BP, normal MS, Bcx PCR + serratia, vanco d/c and meropenem started. Patient switched to zosyn vanc. 37 yo F POD #3 L URS, ureteral biopsy, balloon dilation, ureteral stent placement, capped L NT for left hydro/ureteral stricture admitted with fever, L NT uncapped, continued fevers with stable BP, normal MS, Bcx PCR + serratia, vanco d/c and meropenem started.

## 2018-03-17 NOTE — PROGRESS NOTE ADULT - SUBJECTIVE AND OBJECTIVE BOX
Subjective:      Objectives:  T(C): 36.8 (03-17-18 @ 05:40), Max: 38 (03-17-18 @ 01:30)  HR: 71 (03-17-18 @ 05:40) (71 - 99)  BP: 92/51 (03-17-18 @ 05:40) (92/51 - 108/67)  RR: 17 (03-17-18 @ 05:40) (16 - 17)  SpO2: 100% (03-17-18 @ 05:40) (99% - 100%)  Wt(kg): --    03-15 @ 07:01  -  03-16 @ 07:00  --------------------------------------------------------  IN:  Total IN: 0 mL    OUT:    Indwelling Catheter - Urethral: 110 mL    Nephrostomy Tube: 4750 mL    Voided: 1000 mL  Total OUT: 5860 mL    Total NET: -5860 mL      03-16 @ 07:01  -  03-17 @ 06:56  --------------------------------------------------------  IN:  Total IN: 0 mL    OUT:    Indwelling Catheter - Urethral: 4175 mL    Nephrostomy Tube: 2750 mL  Total OUT: 6925 mL    Total NET: -6925 mL          Physcial Exam  GENERAL: NAD, well-developed  ABDOMEN: Soft, Nontender, Nondistended; Bowel sounds present  GENITOURINARY:  WOUND:  DRAINS:  PSYCH: AAOx3    LABS:                        8.8    4.72  )-----------( 171      ( 17 Mar 2018 05:30 )             28.2     03-16    139  |  107  |  4<L>  ----------------------------<  113<H>  3.4<L>   |  20<L>  |  0.64    Ca    7.4<L>      16 Mar 2018 07:21 Overnight events:  T Max 100.4    Subjective:  Pt c/o myalgias, urinating well, states abdominal/flank pain better    Objective:    Vital signs  T(C): , Max: 38.3 (03-16-18 @ 17:50)  HR: 92 (03-17-18 @ 09:31)  BP: 98/71 (03-17-18 @ 09:31)  SpO2: 100% (03-17-18 @ 09:31)  Wt(kg): --    Output   Collazo: 250  NT: 1,000      Gen: NAD  Abd: soft, minimal left lower quadrant tenderness, no rebound, no guarding, L NT dressing c/d/i, no CVAT  : collazo secured    Labs                        8.8    4.72  )-----------( 171      ( 17 Mar 2018 05:30 )             28.2     16 Mar 2018 07:21    139    |  107    |  4      ----------------------------<  113    3.4     |  20     |  0.64     Ca    7.4        16 Mar 2018 07:21        Urine Cx:   Culture - Urine (03.16.18 @ 07:45)    Culture - Urine:   NO GROWTH AT 24 HOURS    Specimen Source: URINE CATHETER    Culture - Blood (03.16.18 @ 07:44)    Culture - Blood:   NO ORGANISMS ISOLATED  NO ORGANISMS ISOLATED AT 24 HOURS    Specimen Source: BLOOD VENOUS    Culture - Blood (03.16.18 @ 07:44)    Culture - Blood:   NO ORGANISMS ISOLATED  NO ORGANISMS ISOLATED AT 24 HOURS    Specimen Source: BLOOD PERIPHERAL    Culture - Urine (03.15.18 @ 03:07)    Culture - Urine:   GNR^Gram Neg Rods  COLONY COUNT: > = 100,000 CFU/ML    Specimen Source: NEPHROSTOMY - LEFT    Culture - Blood (03.15.18 @ 00:39)    Culture - Blood:   ***Blood Panel PCR results on this specimen are available  approximately 3 hours after the Gram stain result***  Gram stain, PCR, and/or culture results may not always  correspond due to difference in methodologies  ------------------------------------------------------------  This PCR assay was performed using [employer].  The  following targets are tested for:  Enterococcus, vancomycin  resistant enterococci, Listeria monocytogenes,  coagulase  negative staphylococci, S. aureus, methicillin resistant S.  aureus, Streptococcus agalactiae (Group B), S. pneumoniae,  S. pyogenes (Group A), Acinetobacter baumannii, Enterobacter  cloacae, E. coli, Klebsiella oxytoca, K. pneumoniae, Proteus  sp., Serratia marcescens, Haemophilus influenzae, Neisseria  meningitidis, Pseudomonas aeruginosa, Candida albicans, C.  glabrata, C. krusei, C. parapsilosis, C. tropicalis and the  KPC resistance gene.  **NOTE: Due to technical problems, Proteus sp. will NOT be  reported as part of the BCID paneluntil further notice.    -  Serratia marcescens: + DETECT MICHELA    Specimen Source: BLOOD PERIPHERAL    Organism: BLOOD CULTURE PCR    Organism: Serratia marcescens    Gram Stain Blood:   ***** CRITICAL RESULT *****  PERSON CALLED / READ-BACK: KIN NORTH RN / Y  DATE / TIME CALLED: 03/15/18 1701  CALLED BY: RUDY WAGNER                *******************************                * This is an appended result. *  *******************************  A prior result that was reported as final has been changed.  GNR^Gram Neg Rods  AFTER: 14 HOURS INCUBATION  BOTTLE: AEROBIC   ANAEROBIC BOTTLES    Organism Identification: BLOOD CULTURE PCR  Serratia marcescens    Method Type: PCR      Blood Cx:     Imaging  < from: CT Abdomen and Pelvis w/ IV Cont (03.16.18 @ 23:10) >  IMPRESSION: Adequate placement of left percutaneous nephrostomy and left   ureteral stent.    Focal pyelonephritis upper pole left kidney.    < end of copied text >

## 2018-03-17 NOTE — PROGRESS NOTE ADULT - PROBLEM SELECTOR PLAN 1
Continue NT to drainage  Zosyn/Vanc  Continue collazo  Standing toradol, IV tylenol as needed  AM labs  F/U cultures  DVT prophy, IS  OOB, ambulate Continue NT to drainage  Continue meropenem  Continue collazo  Monitor VS  AM labs reviewed  F/U cultures  DVT prophy, IS  OOB, ambulate

## 2018-03-18 LAB
BUN SERPL-MCNC: 5 MG/DL — LOW (ref 7–23)
CALCIUM SERPL-MCNC: 8.6 MG/DL — SIGNIFICANT CHANGE UP (ref 8.4–10.5)
CHLORIDE SERPL-SCNC: 100 MMOL/L — SIGNIFICANT CHANGE UP (ref 98–107)
CO2 SERPL-SCNC: 26 MMOL/L — SIGNIFICANT CHANGE UP (ref 22–31)
CREAT SERPL-MCNC: 0.7 MG/DL — SIGNIFICANT CHANGE UP (ref 0.5–1.3)
GLUCOSE SERPL-MCNC: 95 MG/DL — SIGNIFICANT CHANGE UP (ref 70–99)
HCT VFR BLD CALC: 32.5 % — LOW (ref 34.5–45)
HGB BLD-MCNC: 10.2 G/DL — LOW (ref 11.5–15.5)
MAGNESIUM SERPL-MCNC: 2 MG/DL — SIGNIFICANT CHANGE UP (ref 1.6–2.6)
MCHC RBC-ENTMCNC: 27.7 PG — SIGNIFICANT CHANGE UP (ref 27–34)
MCHC RBC-ENTMCNC: 31.4 % — LOW (ref 32–36)
MCV RBC AUTO: 88.3 FL — SIGNIFICANT CHANGE UP (ref 80–100)
NRBC # FLD: 0 — SIGNIFICANT CHANGE UP
PHOSPHATE SERPL-MCNC: 3.1 MG/DL — SIGNIFICANT CHANGE UP (ref 2.5–4.5)
PLATELET # BLD AUTO: 208 K/UL — SIGNIFICANT CHANGE UP (ref 150–400)
PMV BLD: 9.5 FL — SIGNIFICANT CHANGE UP (ref 7–13)
POTASSIUM SERPL-MCNC: 3.8 MMOL/L — SIGNIFICANT CHANGE UP (ref 3.5–5.3)
POTASSIUM SERPL-SCNC: 3.8 MMOL/L — SIGNIFICANT CHANGE UP (ref 3.5–5.3)
RBC # BLD: 3.68 M/UL — LOW (ref 3.8–5.2)
RBC # FLD: 13.4 % — SIGNIFICANT CHANGE UP (ref 10.3–14.5)
SODIUM SERPL-SCNC: 138 MMOL/L — SIGNIFICANT CHANGE UP (ref 135–145)
WBC # BLD: 4.02 K/UL — SIGNIFICANT CHANGE UP (ref 3.8–10.5)
WBC # FLD AUTO: 4.02 K/UL — SIGNIFICANT CHANGE UP (ref 3.8–10.5)

## 2018-03-18 RX ORDER — ACETAMINOPHEN 500 MG
650 TABLET ORAL ONCE
Qty: 0 | Refills: 0 | Status: COMPLETED | OUTPATIENT
Start: 2018-03-18 | End: 2018-03-18

## 2018-03-18 RX ADMIN — Medication 650 MILLIGRAM(S): at 21:07

## 2018-03-18 RX ADMIN — MEROPENEM 200 MILLIGRAM(S): 1 INJECTION INTRAVENOUS at 07:04

## 2018-03-18 RX ADMIN — SENNA PLUS 2 TABLET(S): 8.6 TABLET ORAL at 21:01

## 2018-03-18 RX ADMIN — HEPARIN SODIUM 5000 UNIT(S): 5000 INJECTION INTRAVENOUS; SUBCUTANEOUS at 18:21

## 2018-03-18 RX ADMIN — TAMSULOSIN HYDROCHLORIDE 0.4 MILLIGRAM(S): 0.4 CAPSULE ORAL at 21:01

## 2018-03-18 RX ADMIN — Medication 1 TABLET(S): at 18:21

## 2018-03-18 RX ADMIN — HEPARIN SODIUM 5000 UNIT(S): 5000 INJECTION INTRAVENOUS; SUBCUTANEOUS at 06:03

## 2018-03-18 RX ADMIN — Medication 100 MILLIGRAM(S): at 14:46

## 2018-03-18 RX ADMIN — Medication 650 MILLIGRAM(S): at 03:15

## 2018-03-18 NOTE — PROGRESS NOTE ADULT - PROBLEM SELECTOR PLAN 1
Continue NT to drainage  Continue meropenem  Continue Flores  Monitor VS  Will review AM labs  F/U cultures  DVT prophy, IS  OOB, ambulate Continue NT to drainage  Change Meropenem to Bactrim, as Cx show Serratia sensitive to Bactrim  Continue Flores  Monitor VS  Will review AM labs  F/U cultures  DVT prophy, IS  OOB, ambulate

## 2018-03-18 NOTE — PROGRESS NOTE ADULT - SUBJECTIVE AND OBJECTIVE BOX
Overnight events:  T Max 100.4    Subjective:  Pt c/o myalgias, urinating well, states abdominal/flank pain better    Objective:    Vital signs  Vital Signs Last 24 Hrs  T(C): 37.1 (18 Mar 2018 05:58), Max: 38.2 (17 Mar 2018 18:13)  T(F): 98.7 (18 Mar 2018 05:58), Max: 100.7 (17 Mar 2018 18:13)  HR: 84 (18 Mar 2018 05:58) (67 - 94)  BP: 95/53 (18 Mar 2018 05:58) (95/53 - 117/69)  BP(mean): --  RR: 17 (18 Mar 2018 05:58) (17 - 18)  SpO2: 99% (18 Mar 2018 05:58) (99% - 100%)  Wt(kg): --    Output   I&O's Detail    16 Mar 2018 07:01  -  17 Mar 2018 07:00  --------------------------------------------------------  IN:  Total IN: 0 mL    OUT:    Indwelling Catheter - Urethral: 4175 mL    Nephrostomy Tube: 2750 mL  Total OUT: 6925 mL    Total NET: -6925 mL      17 Mar 2018 07:01  -  18 Mar 2018 06:57  --------------------------------------------------------  IN:  Total IN: 0 mL    OUT:    Indwelling Catheter - Urethral: 4000 mL    Nephrostomy Tube: 2555 mL  Total OUT: 6555 mL    Total NET: -6555 mL        Gen: NAD  Abd: soft, minimal left lower quadrant tenderness, no rebound, no guarding, L NT dressing c/d/i, no CVAT  : collazo secured    Labs                        8.8    4.72  )-----------( 171      ( 17 Mar 2018 05:30 )             28.2     16 Mar 2018 07:21    139    |  107    |  4      ----------------------------<  113    3.4     |  20     |  0.64     Ca    7.4        16 Mar 2018 07:21        Urine Cx:   Culture - Urine (03.16.18 @ 07:45)    Culture - Urine:   NO GROWTH AT 24 HOURS    Specimen Source: URINE CATHETER    Culture - Blood (03.16.18 @ 07:44)    Culture - Blood:   NO ORGANISMS ISOLATED  NO ORGANISMS ISOLATED AT 24 HOURS    Specimen Source: BLOOD VENOUS    Culture - Blood (03.16.18 @ 07:44)    Culture - Blood:   NO ORGANISMS ISOLATED  NO ORGANISMS ISOLATED AT 24 HOURS    Specimen Source: BLOOD PERIPHERAL    Culture - Urine (03.15.18 @ 03:07)    Culture - Urine:   GNR^Gram Neg Rods  COLONY COUNT: > = 100,000 CFU/ML    Specimen Source: NEPHROSTOMY - LEFT    Culture - Blood (03.15.18 @ 00:39)    Culture - Blood:   ***Blood Panel PCR results on this specimen are available  approximately 3 hours after the Gram stain result***  Gram stain, PCR, and/or culture results may not always  correspond due to difference in methodologies  ------------------------------------------------------------  This PCR assay was performed using [employer].  The  following targets are tested for:  Enterococcus, vancomycin  resistant enterococci, Listeria monocytogenes,  coagulase  negative staphylococci, S. aureus, methicillin resistant S.  aureus, Streptococcus agalactiae (Group B), S. pneumoniae,  S. pyogenes (Group A), Acinetobacter baumannii, Enterobacter  cloacae, E. coli, Klebsiella oxytoca, K. pneumoniae, Proteus  sp., Serratia marcescens, Haemophilus influenzae, Neisseria  meningitidis, Pseudomonas aeruginosa, Candida albicans, C.  glabrata, C. krusei, C. parapsilosis, C. tropicalis and the  KPC resistance gene.  **NOTE: Due to technical problems, Proteus sp. will NOT be  reported as part of the BCID paneluntil further notice.    -  Serratia marcescens: + DETECT MICHELA    Specimen Source: BLOOD PERIPHERAL    Organism: BLOOD CULTURE PCR    Organism: Serratia marcescens    Gram Stain Blood:   ***** CRITICAL RESULT *****  PERSON CALLED / READ-BACK: KIN NORTH RN / Y  DATE / TIME CALLED: 03/15/18 1701  CALLED BY: RUDY WAGNER                *******************************                * This is an appended result. *  *******************************  A prior result that was reported as final has been changed.  GNR^Gram Neg Rods  AFTER: 14 HOURS INCUBATION  BOTTLE: AEROBIC   ANAEROBIC BOTTLES    Organism Identification: BLOOD CULTURE PCR  Serratia marcescens    Method Type: PCR      Blood Cx:     Imaging  < from: CT Abdomen and Pelvis w/ IV Cont (03.16.18 @ 23:10) >  IMPRESSION: Adequate placement of left percutaneous nephrostomy and left   ureteral stent.    Focal pyelonephritis upper pole left kidney. Overnight events:  T Max 100.4    Subjective:  Pt c/o diffuse abdominal pain is improving, urinating well.      Objective:    Vital signs  Vital Signs Last 24 Hrs  T(C): 37.1 (18 Mar 2018 05:58), Max: 38.2 (17 Mar 2018 18:13)  T(F): 98.7 (18 Mar 2018 05:58), Max: 100.7 (17 Mar 2018 18:13)  HR: 84 (18 Mar 2018 05:58) (67 - 94)  BP: 95/53 (18 Mar 2018 05:58) (95/53 - 117/69)  BP(mean): --  RR: 17 (18 Mar 2018 05:58) (17 - 18)  SpO2: 99% (18 Mar 2018 05:58) (99% - 100%)  Wt(kg): --    Output   I&O's Detail    16 Mar 2018 07:01  -  17 Mar 2018 07:00  --------------------------------------------------------  IN:  Total IN: 0 mL    OUT:    Indwelling Catheter - Urethral: 4175 mL    Nephrostomy Tube: 2750 mL  Total OUT: 6925 mL    Total NET: -6925 mL      17 Mar 2018 07:01  -  18 Mar 2018 06:57  --------------------------------------------------------  IN:  Total IN: 0 mL    OUT:    Indwelling Catheter - Urethral: 4000 mL    Nephrostomy Tube: 2555 mL  Total OUT: 6555 mL    Total NET: -6555 mL        Gen: NAD  Abd: soft, minimal left lower quadrant tenderness, no rebound, no guarding, L NT dressing c/d/i, no CVAT  : collazo secured    Labs                        8.8    4.72  )-----------( 171      ( 17 Mar 2018 05:30 )             28.2     16 Mar 2018 07:21    139    |  107    |  4      ----------------------------<  113    3.4     |  20     |  0.64     Ca    7.4        16 Mar 2018 07:21        Urine Cx:   Culture - Urine (03.16.18 @ 07:45)    Culture - Urine:   NO GROWTH AT 24 HOURS    Specimen Source: URINE CATHETER    Culture - Blood (03.16.18 @ 07:44)    Culture - Blood:   NO ORGANISMS ISOLATED  NO ORGANISMS ISOLATED AT 24 HOURS    Specimen Source: BLOOD VENOUS    Culture - Blood (03.16.18 @ 07:44)    Culture - Blood:   NO ORGANISMS ISOLATED  NO ORGANISMS ISOLATED AT 24 HOURS    Specimen Source: BLOOD PERIPHERAL    Culture - Urine (03.15.18 @ 03:07)    Culture - Urine:   GNR^Gram Neg Rods  COLONY COUNT: > = 100,000 CFU/ML    Specimen Source: NEPHROSTOMY - LEFT    Culture - Blood (03.15.18 @ 00:39)    Culture - Blood:   ***Blood Panel PCR results on this specimen are available  approximately 3 hours after the Gram stain result***  Gram stain, PCR, and/or culture results may not always  correspond due to difference in methodologies  ------------------------------------------------------------  This PCR assay was performed using [employer].  The  following targets are tested for:  Enterococcus, vancomycin  resistant enterococci, Listeria monocytogenes,  coagulase  negative staphylococci, S. aureus, methicillin resistant S.  aureus, Streptococcus agalactiae (Group B), S. pneumoniae,  S. pyogenes (Group A), Acinetobacter baumannii, Enterobacter  cloacae, E. coli, Klebsiella oxytoca, K. pneumoniae, Proteus  sp., Serratia marcescens, Haemophilus influenzae, Neisseria  meningitidis, Pseudomonas aeruginosa, Candida albicans, C.  glabrata, C. krusei, C. parapsilosis, C. tropicalis and the  KPC resistance gene.  **NOTE: Due to technical problems, Proteus sp. will NOT be  reported as part of the BCID paneluntil further notice.    -  Serratia marcescens: + DETECT MICHELA    Specimen Source: BLOOD PERIPHERAL    Organism: BLOOD CULTURE PCR    Organism: Serratia marcescens    Gram Stain Blood:   ***** CRITICAL RESULT *****  PERSON CALLED / READ-BACK: KIN NORTH RN / Y  DATE / TIME CALLED: 03/15/18 1701  CALLED BY: RUDY WAGNER                *******************************                * This is an appended result. *  *******************************  A prior result that was reported as final has been changed.  GNR^Gram Neg Rods  AFTER: 14 HOURS INCUBATION  BOTTLE: AEROBIC   ANAEROBIC BOTTLES    Organism Identification: BLOOD CULTURE PCR  Serratia marcescens    Method Type: PCR      Blood Cx:     Imaging  < from: CT Abdomen and Pelvis w/ IV Cont (03.16.18 @ 23:10) >  IMPRESSION: Adequate placement of left percutaneous nephrostomy and left   ureteral stent.    Focal pyelonephritis upper pole left kidney.

## 2018-03-19 ENCOUNTER — TRANSCRIPTION ENCOUNTER (OUTPATIENT)
Age: 38
End: 2018-03-19

## 2018-03-19 VITALS
RESPIRATION RATE: 16 BRPM | OXYGEN SATURATION: 99 % | SYSTOLIC BLOOD PRESSURE: 99 MMHG | HEART RATE: 90 BPM | TEMPERATURE: 98 F | DIASTOLIC BLOOD PRESSURE: 61 MMHG

## 2018-03-19 LAB
HCT VFR BLD CALC: 34.2 % — LOW (ref 34.5–45)
HGB BLD-MCNC: 10.7 G/DL — LOW (ref 11.5–15.5)
MCHC RBC-ENTMCNC: 27.8 PG — SIGNIFICANT CHANGE UP (ref 27–34)
MCHC RBC-ENTMCNC: 31.3 % — LOW (ref 32–36)
MCV RBC AUTO: 88.8 FL — SIGNIFICANT CHANGE UP (ref 80–100)
NRBC # FLD: 0 — SIGNIFICANT CHANGE UP
PLATELET # BLD AUTO: 237 K/UL — SIGNIFICANT CHANGE UP (ref 150–400)
PMV BLD: 9.4 FL — SIGNIFICANT CHANGE UP (ref 7–13)
RBC # BLD: 3.85 M/UL — SIGNIFICANT CHANGE UP (ref 3.8–5.2)
RBC # FLD: 13.2 % — SIGNIFICANT CHANGE UP (ref 10.3–14.5)
WBC # BLD: 5.64 K/UL — SIGNIFICANT CHANGE UP (ref 3.8–10.5)
WBC # FLD AUTO: 5.64 K/UL — SIGNIFICANT CHANGE UP (ref 3.8–10.5)

## 2018-03-19 RX ORDER — TAMSULOSIN HYDROCHLORIDE 0.4 MG/1
1 CAPSULE ORAL
Qty: 30 | Refills: 0
Start: 2018-03-19 | End: 2018-04-17

## 2018-03-19 RX ADMIN — Medication 100 MILLIGRAM(S): at 12:07

## 2018-03-19 RX ADMIN — Medication 1 TABLET(S): at 05:15

## 2018-03-19 NOTE — DISCHARGE NOTE ADULT - INSTRUCTIONS
as tolerated Call MD for any c/o difficulty urinating, bloody urine, fever, pain not relieved after medicated for pain and a return appointment.  Take over the counter stool softener to prevent constipation that can be a side effect from taking pain medications

## 2018-03-19 NOTE — DISCHARGE NOTE ADULT - CARE PROVIDER_API CALL
urology clinic,   [facility] urology  [address] [place] [address]  Phone: [phone]  Fax: (       -

## 2018-03-19 NOTE — DISCHARGE NOTE ADULT - PLAN OF CARE
Neph tube as above; drink plenty of fluids; change dressing on neph tube daily or as needed; empty as needed; make appt with clinic for Friday Mar 30

## 2018-03-19 NOTE — DISCHARGE NOTE ADULT - NS AS DC FOLLOWUP STROKE INST
bactrim tamsulosin, percocet, ureteral stent placement, nephrostomy tube care/Influenza vaccination (VIS Pub Date: August 7, 2015) bactrim tamsulosin, percocet, ureteral stent placement, nephrostomy tube care

## 2018-03-19 NOTE — DISCHARGE NOTE ADULT - PROVIDER TOKENS
FREE:[LAST:[urology clinic],PHONE:[phone]],FAX:[(   [phone]    -],ADDRESS:[[facility] urology  [address] [place] [address]]]

## 2018-03-19 NOTE — PROGRESS NOTE ADULT - ASSESSMENT
39 yo F POD #4 L URS, ureteral biopsy, balloon dilation, ureteral stent placement, capped L NT for left hydro/ureteral stricture admitted with fever, L NT uncapped, afebrile X 24 hours

## 2018-03-19 NOTE — DISCHARGE NOTE ADULT - CARE PLAN
Principal Discharge DX:	Acquired hydronephrosis  Goal:	Neph tube  Assessment and plan of treatment:	as above; drink plenty of fluids; change dressing on neph tube daily or as needed; empty as needed; make appt with clinic for Friday Mar 30

## 2018-03-19 NOTE — DISCHARGE NOTE ADULT - MEDICATION SUMMARY - MEDICATIONS TO TAKE
I will START or STAY ON the medications listed below when I get home from the hospital:    oxyCODONE-acetaminophen 5 mg-325 mg oral tablet  -- 1-2 tab(s) by mouth every 6 hours, As Needed MDD:8  -- Caution federal law prohibits the transfer of this drug to any person other  than the person for whom it was prescribed.  May cause drowsiness.  Alcohol may intensify this effect.  Use care when operating dangerous machinery.  This prescription cannot be refilled.  This product contains acetaminophen.  Do not use  with any other product containing acetaminophen to prevent possible liver damage.  Using more of this medication than prescribed may cause serious breathing problems.    -- Indication: For pain    Flomax 0.4 mg oral capsule  -- 1 cap(s) by mouth once a day   -- It is very important that you take or use this exactly as directed.  Do not skip doses or discontinue unless directed by your doctor.  May cause drowsiness.  Alcohol may intensify this effect.  Use care when operating dangerous machinery.  Some non-prescription drugs may aggravate your condition.  Read all labels carefully.  If a warning appears, check with your doctor before taking.  Swallow whole.  Do not crush.  Take with food or milk.    -- Indication: For helps stent pain    sulfamethoxazole-trimethoprim 800 mg-160 mg oral tablet  -- 1 tab(s) by mouth 2 times a day  -- Indication: For Antibiotic

## 2018-03-19 NOTE — DISCHARGE NOTE ADULT - CONDITIONS AT DISCHARGE
Left flank area with nephrostomy tube draining yellow fluids, +void yellow urine, Tolerated po and fluids, afebrile.  Patient understood and agree with discharge planning

## 2018-03-19 NOTE — DISCHARGE NOTE ADULT - PATIENT PORTAL LINK FT
You can access the [email or web address], offered by Health system, by registering with the following website: [email or web address]

## 2018-03-19 NOTE — DISCHARGE NOTE ADULT - HOSPITAL COURSE
Pt had L. URS/stent; already had a neph tube; re-admitted next day with fever; grew serratia in blood/urine; WBC down; afeb; home on Bactrim; f/u in clinic next week; still has neph tube

## 2018-03-21 LAB
BACTERIA BLD CULT: SIGNIFICANT CHANGE UP
BACTERIA BLD CULT: SIGNIFICANT CHANGE UP

## 2018-03-22 LAB — FUNGUS SPEC QL CULT: SIGNIFICANT CHANGE UP

## 2018-03-30 ENCOUNTER — APPOINTMENT (OUTPATIENT)
Dept: UROLOGY | Facility: CLINIC | Age: 38
End: 2018-03-30

## 2018-03-30 ENCOUNTER — OUTPATIENT (OUTPATIENT)
Dept: OUTPATIENT SERVICES | Facility: HOSPITAL | Age: 38
LOS: 1 days | End: 2018-03-30
Payer: SELF-PAY

## 2018-03-30 DIAGNOSIS — Z87.448 PERSONAL HISTORY OF OTHER DISEASES OF URINARY SYSTEM: Chronic | ICD-10-CM

## 2018-03-30 DIAGNOSIS — N13.30 UNSPECIFIED HYDRONEPHROSIS: Chronic | ICD-10-CM

## 2018-03-30 DIAGNOSIS — N13.5 CROSSING VESSEL AND STRICTURE OF URETER WITHOUT HYDRONEPHROSIS: Chronic | ICD-10-CM

## 2018-03-30 DIAGNOSIS — Q62.11 CONGENITAL OCCLUSION OF URETEROPELVIC JUNCTION: ICD-10-CM

## 2018-03-30 DIAGNOSIS — N13.5 CROSSING VESSEL AND STRICTURE OF URETER W/OUT HYDRONEPHROSIS: ICD-10-CM

## 2018-03-30 DIAGNOSIS — R35.0 FREQUENCY OF MICTURITION: ICD-10-CM

## 2018-03-30 PROCEDURE — G0463: CPT

## 2018-04-03 DIAGNOSIS — N13.5 CROSSING VESSEL AND STRICTURE OF URETER WITHOUT HYDRONEPHROSIS: ICD-10-CM

## 2018-04-03 DIAGNOSIS — Q62.11 CONGENITAL OCCLUSION OF URETEROPELVIC JUNCTION: ICD-10-CM

## 2018-04-09 NOTE — H&P PST ADULT - PSH
Daily Note     Today's date: 2018  Patient name: Salima Portillo  : 1952  MRN: [id number]  Referring provider: Jeanie Ramirez DO  Dx:   Encounter Diagnosis     ICD-10-CM    1  Lumbar spondylolysis M43 06    2  Low back pain, unspecified back pain laterality, unspecified chronicity, with sciatica presence unspecified M54 5    3  Acute pain of right knee M25 561                   Subjective: Patient note pain today 2-3/10 LB and B knees  Objective: See treatment diary below      Assessment: Patient noted post exercises some dizziness  Checked patient vitals 02 97, HR 69, /58 mmhg  Patient noted that his BP this morning was 129/61mmhg  Patient also noted that his sugar was high today due to eating pretzels last night  Discussed with patient to monitor how he feels and if needed call Md  Added mini squats, seated abd with band and seated pball rolls with no increase of pain or complaints  Patient would benefit from continued Pt  Dizziness decreased post rest break at end of treatment  Plan: Continue per plan of care        Dx: LBP (with R sciatica); R knee pain  EPOC: 5/10/18  Precautions: DM Ii; cardiac disease; SOB quickly  CO-MORBIDITES: HTN  PERSONAL FACTORS: none    Manual             R Knee PROM 5' 5' inadvertently missed NV                                                                  Exercise Diary            Recumbent Bike 5' (vc'ing to breathe) 5' 5'          Gastroc Stretch 30"X3  15"x5  b/l 15"x5 b/l          HR/TR (seated) 15x ea np           Quad sets 10"x10 5"x10  b/l 5"x10  b/l          LAQ 10"X10 5"  x10 5"x10            Standing Marches  NV 8ea 8ea          HS curl   NV 10  ea 10xea          Standing Hip Abduction             Step Up/lateral             Mini squats   10x          Iso Hip ABD 10"X10 5"  x10 5"x15          Iso Hip ADD 10"X10 5"  x10 5"x15          SLR             Seated hip add:DLS             Seated hip abd: DLS   OTB Q80 5 sec          Seated pball flexion stretch   [id number]                                                     Modalities  4/2  4/5           CP - PRN   (LB in seated) 10' No significant past surgical history

## 2018-04-13 ENCOUNTER — APPOINTMENT (OUTPATIENT)
Dept: UROLOGY | Facility: CLINIC | Age: 38
End: 2018-04-13

## 2018-04-13 ENCOUNTER — OUTPATIENT (OUTPATIENT)
Dept: OUTPATIENT SERVICES | Facility: HOSPITAL | Age: 38
LOS: 1 days | End: 2018-04-13
Payer: SELF-PAY

## 2018-04-13 VITALS
RESPIRATION RATE: 15 BRPM | SYSTOLIC BLOOD PRESSURE: 111 MMHG | BODY MASS INDEX: 30.12 KG/M2 | HEIGHT: 63 IN | TEMPERATURE: 98.2 F | WEIGHT: 170 LBS | DIASTOLIC BLOOD PRESSURE: 75 MMHG | HEART RATE: 88 BPM

## 2018-04-13 DIAGNOSIS — N13.5 CROSSING VESSEL AND STRICTURE OF URETER WITHOUT HYDRONEPHROSIS: Chronic | ICD-10-CM

## 2018-04-13 DIAGNOSIS — N13.30 UNSPECIFIED HYDRONEPHROSIS: Chronic | ICD-10-CM

## 2018-04-13 DIAGNOSIS — R35.0 FREQUENCY OF MICTURITION: ICD-10-CM

## 2018-04-13 DIAGNOSIS — Z87.448 PERSONAL HISTORY OF OTHER DISEASES OF URINARY SYSTEM: Chronic | ICD-10-CM

## 2018-04-13 PROCEDURE — 52310 CYSTOSCOPY AND TREATMENT: CPT

## 2018-04-24 DIAGNOSIS — N13.5 CROSSING VESSEL AND STRICTURE OF URETER WITHOUT HYDRONEPHROSIS: ICD-10-CM

## 2018-04-27 ENCOUNTER — APPOINTMENT (OUTPATIENT)
Dept: UROLOGY | Facility: CLINIC | Age: 38
End: 2018-04-27

## 2018-05-02 ENCOUNTER — OTHER (OUTPATIENT)
Age: 38
End: 2018-05-02

## 2018-05-07 ENCOUNTER — APPOINTMENT (OUTPATIENT)
Dept: NUCLEAR MEDICINE | Facility: HOSPITAL | Age: 38
End: 2018-05-07
Payer: COMMERCIAL

## 2018-05-07 ENCOUNTER — OUTPATIENT (OUTPATIENT)
Dept: OUTPATIENT SERVICES | Facility: HOSPITAL | Age: 38
LOS: 1 days | End: 2018-05-07

## 2018-05-07 DIAGNOSIS — N13.30 UNSPECIFIED HYDRONEPHROSIS: Chronic | ICD-10-CM

## 2018-05-07 DIAGNOSIS — N13.30 UNSPECIFIED HYDRONEPHROSIS: ICD-10-CM

## 2018-05-07 DIAGNOSIS — N13.5 CROSSING VESSEL AND STRICTURE OF URETER WITHOUT HYDRONEPHROSIS: Chronic | ICD-10-CM

## 2018-05-07 DIAGNOSIS — Z87.448 PERSONAL HISTORY OF OTHER DISEASES OF URINARY SYSTEM: Chronic | ICD-10-CM

## 2018-05-07 PROCEDURE — 78707 K FLOW/FUNCT IMAGE W/O DRUG: CPT | Mod: 26

## 2018-05-11 ENCOUNTER — APPOINTMENT (OUTPATIENT)
Dept: UROLOGY | Facility: CLINIC | Age: 38
End: 2018-05-11

## 2018-05-11 ENCOUNTER — OUTPATIENT (OUTPATIENT)
Dept: OUTPATIENT SERVICES | Facility: HOSPITAL | Age: 38
LOS: 1 days | End: 2018-05-11
Payer: SELF-PAY

## 2018-05-11 DIAGNOSIS — N13.30 UNSPECIFIED HYDRONEPHROSIS: Chronic | ICD-10-CM

## 2018-05-11 DIAGNOSIS — N13.5 CROSSING VESSEL AND STRICTURE OF URETER WITHOUT HYDRONEPHROSIS: Chronic | ICD-10-CM

## 2018-05-11 DIAGNOSIS — Z87.448 PERSONAL HISTORY OF OTHER DISEASES OF URINARY SYSTEM: Chronic | ICD-10-CM

## 2018-05-11 DIAGNOSIS — R35.0 FREQUENCY OF MICTURITION: ICD-10-CM

## 2018-05-11 PROCEDURE — G0463: CPT

## 2018-05-14 DIAGNOSIS — N13.5 CROSSING VESSEL AND STRICTURE OF URETER WITHOUT HYDRONEPHROSIS: ICD-10-CM

## 2018-05-14 DIAGNOSIS — N13.30 UNSPECIFIED HYDRONEPHROSIS: ICD-10-CM

## 2018-05-16 ENCOUNTER — APPOINTMENT (OUTPATIENT)
Dept: NUCLEAR MEDICINE | Facility: HOSPITAL | Age: 38
End: 2018-05-16
Payer: COMMERCIAL

## 2018-05-16 ENCOUNTER — OUTPATIENT (OUTPATIENT)
Dept: OUTPATIENT SERVICES | Facility: HOSPITAL | Age: 38
LOS: 1 days | End: 2018-05-16

## 2018-05-16 DIAGNOSIS — Z87.448 PERSONAL HISTORY OF OTHER DISEASES OF URINARY SYSTEM: Chronic | ICD-10-CM

## 2018-05-16 DIAGNOSIS — N13.30 UNSPECIFIED HYDRONEPHROSIS: Chronic | ICD-10-CM

## 2018-05-16 DIAGNOSIS — N13.5 CROSSING VESSEL AND STRICTURE OF URETER WITHOUT HYDRONEPHROSIS: Chronic | ICD-10-CM

## 2018-05-16 DIAGNOSIS — N13.5 CROSSING VESSEL AND STRICTURE OF URETER WITHOUT HYDRONEPHROSIS: ICD-10-CM

## 2018-05-16 PROCEDURE — 78708 K FLOW/FUNCT IMAGE W/DRUG: CPT | Mod: 26

## 2018-05-16 PROCEDURE — 51702 INSERT TEMP BLADDER CATH: CPT

## 2018-05-18 ENCOUNTER — OUTPATIENT (OUTPATIENT)
Dept: OUTPATIENT SERVICES | Facility: HOSPITAL | Age: 38
LOS: 1 days | End: 2018-05-18
Payer: SELF-PAY

## 2018-05-18 ENCOUNTER — APPOINTMENT (OUTPATIENT)
Dept: UROLOGY | Facility: CLINIC | Age: 38
End: 2018-05-18

## 2018-05-18 DIAGNOSIS — R35.0 FREQUENCY OF MICTURITION: ICD-10-CM

## 2018-05-18 DIAGNOSIS — N13.5 CROSSING VESSEL AND STRICTURE OF URETER WITHOUT HYDRONEPHROSIS: Chronic | ICD-10-CM

## 2018-05-18 DIAGNOSIS — N13.30 UNSPECIFIED HYDRONEPHROSIS: Chronic | ICD-10-CM

## 2018-05-18 DIAGNOSIS — Z87.448 PERSONAL HISTORY OF OTHER DISEASES OF URINARY SYSTEM: Chronic | ICD-10-CM

## 2018-05-18 PROCEDURE — G0463: CPT

## 2018-05-21 DIAGNOSIS — N13.5 CROSSING VESSEL AND STRICTURE OF URETER WITHOUT HYDRONEPHROSIS: ICD-10-CM

## 2018-07-30 PROBLEM — N13.30 UNSPECIFIED HYDRONEPHROSIS: Chronic | Status: ACTIVE | Noted: 2017-06-12

## 2018-07-30 PROBLEM — N13.5 CROSSING VESSEL AND STRICTURE OF URETER WITHOUT HYDRONEPHROSIS: Chronic | Status: ACTIVE | Noted: 2018-03-03

## 2018-07-30 PROBLEM — J30.2 OTHER SEASONAL ALLERGIC RHINITIS: Chronic | Status: ACTIVE | Noted: 2017-06-12

## 2018-08-07 ENCOUNTER — FORM ENCOUNTER (OUTPATIENT)
Age: 38
End: 2018-08-07

## 2018-08-08 ENCOUNTER — APPOINTMENT (OUTPATIENT)
Dept: ULTRASOUND IMAGING | Facility: CLINIC | Age: 38
End: 2018-08-08
Payer: COMMERCIAL

## 2018-08-08 ENCOUNTER — OUTPATIENT (OUTPATIENT)
Dept: OUTPATIENT SERVICES | Facility: HOSPITAL | Age: 38
LOS: 1 days | End: 2018-08-08
Payer: SELF-PAY

## 2018-08-08 DIAGNOSIS — N13.30 UNSPECIFIED HYDRONEPHROSIS: Chronic | ICD-10-CM

## 2018-08-08 DIAGNOSIS — N13.5 CROSSING VESSEL AND STRICTURE OF URETER WITHOUT HYDRONEPHROSIS: ICD-10-CM

## 2018-08-08 DIAGNOSIS — N13.5 CROSSING VESSEL AND STRICTURE OF URETER WITHOUT HYDRONEPHROSIS: Chronic | ICD-10-CM

## 2018-08-08 DIAGNOSIS — Z87.448 PERSONAL HISTORY OF OTHER DISEASES OF URINARY SYSTEM: Chronic | ICD-10-CM

## 2018-08-08 PROCEDURE — 76770 US EXAM ABDO BACK WALL COMP: CPT | Mod: 26

## 2018-08-08 PROCEDURE — 76770 US EXAM ABDO BACK WALL COMP: CPT

## 2018-08-17 ENCOUNTER — APPOINTMENT (OUTPATIENT)
Dept: UROLOGY | Facility: CLINIC | Age: 38
End: 2018-08-17

## 2018-08-17 ENCOUNTER — OUTPATIENT (OUTPATIENT)
Dept: OUTPATIENT SERVICES | Facility: HOSPITAL | Age: 38
LOS: 1 days | End: 2018-08-17
Payer: SELF-PAY

## 2018-08-17 DIAGNOSIS — N13.30 UNSPECIFIED HYDRONEPHROSIS: Chronic | ICD-10-CM

## 2018-08-17 DIAGNOSIS — N13.5 CROSSING VESSEL AND STRICTURE OF URETER WITHOUT HYDRONEPHROSIS: Chronic | ICD-10-CM

## 2018-08-17 DIAGNOSIS — R35.0 FREQUENCY OF MICTURITION: ICD-10-CM

## 2018-08-17 DIAGNOSIS — Z87.448 PERSONAL HISTORY OF OTHER DISEASES OF URINARY SYSTEM: Chronic | ICD-10-CM

## 2018-08-17 LAB
ANION GAP SERPL CALC-SCNC: 15 MMOL/L
BUN SERPL-MCNC: 11 MG/DL
CALCIUM SERPL-MCNC: 9.4 MG/DL
CHLORIDE SERPL-SCNC: 103 MMOL/L
CO2 SERPL-SCNC: 25 MMOL/L
CREAT SERPL-MCNC: 0.82 MG/DL
GLUCOSE SERPL-MCNC: 101 MG/DL
POTASSIUM SERPL-SCNC: 4.6 MMOL/L
SODIUM SERPL-SCNC: 143 MMOL/L

## 2018-08-17 PROCEDURE — G0463: CPT

## 2019-01-23 NOTE — ED ADULT NURSE NOTE - NSSISCREENINGQ3_ED_A_ED
I spoke with patient this morning concerning Lidocaine 2% topical jelly not being in stock at [facility] pharmacy. Per Dr. Onofer, No

## 2019-04-09 ENCOUNTER — EMERGENCY (EMERGENCY)
Facility: HOSPITAL | Age: 39
LOS: 1 days | Discharge: HOME | End: 2019-04-09
Admitting: EMERGENCY MEDICINE
Payer: SELF-PAY

## 2019-04-09 VITALS
TEMPERATURE: 98 F | OXYGEN SATURATION: 99 % | HEART RATE: 93 BPM | SYSTOLIC BLOOD PRESSURE: 115 MMHG | RESPIRATION RATE: 16 BRPM | DIASTOLIC BLOOD PRESSURE: 74 MMHG

## 2019-04-09 VITALS
TEMPERATURE: 99 F | DIASTOLIC BLOOD PRESSURE: 62 MMHG | HEART RATE: 75 BPM | RESPIRATION RATE: 16 BRPM | OXYGEN SATURATION: 99 % | SYSTOLIC BLOOD PRESSURE: 108 MMHG

## 2019-04-09 DIAGNOSIS — Z87.448 PERSONAL HISTORY OF OTHER DISEASES OF URINARY SYSTEM: Chronic | ICD-10-CM

## 2019-04-09 DIAGNOSIS — N13.30 UNSPECIFIED HYDRONEPHROSIS: Chronic | ICD-10-CM

## 2019-04-09 DIAGNOSIS — N13.5 CROSSING VESSEL AND STRICTURE OF URETER WITHOUT HYDRONEPHROSIS: Chronic | ICD-10-CM

## 2019-04-09 LAB
ALBUMIN SERPL ELPH-MCNC: 4.3 G/DL — SIGNIFICANT CHANGE UP (ref 3.3–5)
ALP SERPL-CCNC: 78 U/L — SIGNIFICANT CHANGE UP (ref 40–120)
ALT FLD-CCNC: 15 U/L — SIGNIFICANT CHANGE UP (ref 4–33)
ANION GAP SERPL CALC-SCNC: 12 MMO/L — SIGNIFICANT CHANGE UP (ref 7–14)
APPEARANCE UR: CLEAR — SIGNIFICANT CHANGE UP
AST SERPL-CCNC: 12 U/L — SIGNIFICANT CHANGE UP (ref 4–32)
BASE EXCESS BLDV CALC-SCNC: 0.6 MMOL/L — SIGNIFICANT CHANGE UP
BASOPHILS # BLD AUTO: 0.05 K/UL — SIGNIFICANT CHANGE UP (ref 0–0.2)
BASOPHILS NFR BLD AUTO: 0.4 % — SIGNIFICANT CHANGE UP (ref 0–2)
BILIRUB SERPL-MCNC: 0.2 MG/DL — SIGNIFICANT CHANGE UP (ref 0.2–1.2)
BILIRUB UR-MCNC: NEGATIVE — SIGNIFICANT CHANGE UP
BLOOD GAS VENOUS - CREATININE: 0.67 MG/DL — SIGNIFICANT CHANGE UP (ref 0.5–1.3)
BLOOD UR QL VISUAL: NEGATIVE — SIGNIFICANT CHANGE UP
BUN SERPL-MCNC: 11 MG/DL — SIGNIFICANT CHANGE UP (ref 7–23)
CALCIUM SERPL-MCNC: 8.9 MG/DL — SIGNIFICANT CHANGE UP (ref 8.4–10.5)
CHLORIDE BLDV-SCNC: 107 MMOL/L — SIGNIFICANT CHANGE UP (ref 96–108)
CHLORIDE SERPL-SCNC: 105 MMOL/L — SIGNIFICANT CHANGE UP (ref 98–107)
CO2 SERPL-SCNC: 23 MMOL/L — SIGNIFICANT CHANGE UP (ref 22–31)
COLOR SPEC: SIGNIFICANT CHANGE UP
CREAT SERPL-MCNC: 0.72 MG/DL — SIGNIFICANT CHANGE UP (ref 0.5–1.3)
EOSINOPHIL # BLD AUTO: 0.27 K/UL — SIGNIFICANT CHANGE UP (ref 0–0.5)
EOSINOPHIL NFR BLD AUTO: 2.2 % — SIGNIFICANT CHANGE UP (ref 0–6)
GAS PNL BLDV: 138 MMOL/L — SIGNIFICANT CHANGE UP (ref 136–146)
GLUCOSE BLDV-MCNC: 113 — HIGH (ref 70–99)
GLUCOSE SERPL-MCNC: 106 MG/DL — HIGH (ref 70–99)
GLUCOSE UR-MCNC: NEGATIVE — SIGNIFICANT CHANGE UP
HCO3 BLDV-SCNC: 23 MMOL/L — SIGNIFICANT CHANGE UP (ref 20–27)
HCT VFR BLD CALC: 37.6 % — SIGNIFICANT CHANGE UP (ref 34.5–45)
HCT VFR BLDV CALC: 42.7 % — SIGNIFICANT CHANGE UP (ref 34.5–45)
HGB BLD-MCNC: 11.9 G/DL — SIGNIFICANT CHANGE UP (ref 11.5–15.5)
HGB BLDV-MCNC: 13.9 G/DL — SIGNIFICANT CHANGE UP (ref 11.5–15.5)
IMM GRANULOCYTES NFR BLD AUTO: 0.2 % — SIGNIFICANT CHANGE UP (ref 0–1.5)
KETONES UR-MCNC: NEGATIVE — SIGNIFICANT CHANGE UP
LACTATE BLDV-MCNC: 1.1 MMOL/L — SIGNIFICANT CHANGE UP (ref 0.5–2)
LEUKOCYTE ESTERASE UR-ACNC: NEGATIVE — SIGNIFICANT CHANGE UP
LYMPHOCYTES # BLD AUTO: 16.6 % — SIGNIFICANT CHANGE UP (ref 13–44)
LYMPHOCYTES # BLD AUTO: 2 K/UL — SIGNIFICANT CHANGE UP (ref 1–3.3)
MCHC RBC-ENTMCNC: 27 PG — SIGNIFICANT CHANGE UP (ref 27–34)
MCHC RBC-ENTMCNC: 31.6 % — LOW (ref 32–36)
MCV RBC AUTO: 85.3 FL — SIGNIFICANT CHANGE UP (ref 80–100)
MONOCYTES # BLD AUTO: 0.64 K/UL — SIGNIFICANT CHANGE UP (ref 0–0.9)
MONOCYTES NFR BLD AUTO: 5.3 % — SIGNIFICANT CHANGE UP (ref 2–14)
NEUTROPHILS # BLD AUTO: 9.08 K/UL — HIGH (ref 1.8–7.4)
NEUTROPHILS NFR BLD AUTO: 75.3 % — SIGNIFICANT CHANGE UP (ref 43–77)
NITRITE UR-MCNC: NEGATIVE — SIGNIFICANT CHANGE UP
NRBC # FLD: 0 K/UL — SIGNIFICANT CHANGE UP (ref 0–0)
PCO2 BLDV: 47 MMHG — SIGNIFICANT CHANGE UP (ref 41–51)
PH BLDV: 7.35 PH — SIGNIFICANT CHANGE UP (ref 7.32–7.43)
PH UR: 6 — SIGNIFICANT CHANGE UP (ref 5–8)
PLATELET # BLD AUTO: 281 K/UL — SIGNIFICANT CHANGE UP (ref 150–400)
PMV BLD: 9.6 FL — SIGNIFICANT CHANGE UP (ref 7–13)
PO2 BLDV: 26 MMHG — LOW (ref 35–40)
POTASSIUM BLDV-SCNC: 3.9 MMOL/L — SIGNIFICANT CHANGE UP (ref 3.4–4.5)
POTASSIUM SERPL-MCNC: 4.2 MMOL/L — SIGNIFICANT CHANGE UP (ref 3.5–5.3)
POTASSIUM SERPL-SCNC: 4.2 MMOL/L — SIGNIFICANT CHANGE UP (ref 3.5–5.3)
PROT SERPL-MCNC: 7.3 G/DL — SIGNIFICANT CHANGE UP (ref 6–8.3)
PROT UR-MCNC: NEGATIVE — SIGNIFICANT CHANGE UP
RBC # BLD: 4.41 M/UL — SIGNIFICANT CHANGE UP (ref 3.8–5.2)
RBC # FLD: 14.9 % — HIGH (ref 10.3–14.5)
RBC CASTS # UR COMP ASSIST: SIGNIFICANT CHANGE UP (ref 0–?)
SAO2 % BLDV: 37.9 % — LOW (ref 60–85)
SODIUM SERPL-SCNC: 140 MMOL/L — SIGNIFICANT CHANGE UP (ref 135–145)
SP GR SPEC: 1.02 — SIGNIFICANT CHANGE UP (ref 1–1.04)
UROBILINOGEN FLD QL: NORMAL — SIGNIFICANT CHANGE UP
WBC # BLD: 12.07 K/UL — HIGH (ref 3.8–10.5)
WBC # FLD AUTO: 12.07 K/UL — HIGH (ref 3.8–10.5)
WBC UR QL: SIGNIFICANT CHANGE UP (ref 0–?)

## 2019-04-09 PROCEDURE — 99053 MED SERV 10PM-8AM 24 HR FAC: CPT

## 2019-04-09 PROCEDURE — 74177 CT ABD & PELVIS W/CONTRAST: CPT | Mod: 26

## 2019-04-09 PROCEDURE — 99285 EMERGENCY DEPT VISIT HI MDM: CPT | Mod: 25

## 2019-04-09 RX ORDER — MORPHINE SULFATE 50 MG/1
4 CAPSULE, EXTENDED RELEASE ORAL ONCE
Qty: 0 | Refills: 0 | Status: DISCONTINUED | OUTPATIENT
Start: 2019-04-09 | End: 2019-04-09

## 2019-04-09 RX ORDER — ONDANSETRON 8 MG/1
4 TABLET, FILM COATED ORAL ONCE
Qty: 0 | Refills: 0 | Status: COMPLETED | OUTPATIENT
Start: 2019-04-09 | End: 2019-04-09

## 2019-04-09 RX ORDER — SODIUM CHLORIDE 9 MG/ML
1000 INJECTION INTRAMUSCULAR; INTRAVENOUS; SUBCUTANEOUS ONCE
Qty: 0 | Refills: 0 | Status: COMPLETED | OUTPATIENT
Start: 2019-04-09 | End: 2019-04-09

## 2019-04-09 RX ADMIN — SODIUM CHLORIDE 1000 MILLILITER(S): 9 INJECTION INTRAMUSCULAR; INTRAVENOUS; SUBCUTANEOUS at 02:14

## 2019-04-09 RX ADMIN — ONDANSETRON 4 MILLIGRAM(S): 8 TABLET, FILM COATED ORAL at 02:14

## 2019-04-09 RX ADMIN — MORPHINE SULFATE 4 MILLIGRAM(S): 50 CAPSULE, EXTENDED RELEASE ORAL at 02:14

## 2019-04-09 NOTE — ED ADULT NURSE NOTE - NSIMPLEMENTINTERV_GEN_ALL_ED
Implemented All Universal Safety Interventions:  [place] to call system. Call bell, personal items and telephone within reach. Instruct patient to call for assistance. Room bathroom lighting operational. Non-slip footwear when patient is off stretcher. Physically safe environment: no spills, clutter or unnecessary equipment. Stretcher in lowest position, wheels locked, appropriate side rails in place.

## 2019-04-09 NOTE — ED PROVIDER NOTE - OBJECTIVE STATEMENT
38 y/o female pmh uretral stricture, left hydronephrosis, presents with 1 day gradual worsening terry-umbilical abdominal pain, llq and now rlq abdominal pain, no aggravating/relieving factors 7/10, + nausea, denies any headaches, neck pain, cough, f/c/v/c, chest pain, sob, urinary symptoms, numbness/weakness/tingling, recent travel, sick contact, social history 38 y/o female pmh uretral stricture, left hydronephrosis, presents with 1 day gradual worsening teryr-umbilical abdominal pain, and rlq abdominal pain, no aggravating/relieving factors 7/10, + nausea, denies any headaches, neck pain, cough, f/c/v/c, chest pain, sob, urinary symptoms, numbness/weakness/tingling, recent travel, sick contact, social history

## 2019-04-09 NOTE — ED ADULT NURSE NOTE - OBJECTIVE STATEMENT
Pt A/Ox3 states she noted lower diffuse abdominal pain since this evening, pt states the pain started after she ate fruit, denies fevers, +nausea. Pt appears uncomfortable, breathing well and non labored, skin warm and dry. PIV inserted with aseptic technique, blood drawn, labeled at bedside with 2 pt identifiers and sent to lab. Pt and family aware of POC, NAD. Will continue to monitor. Pt medicated per MD order.

## 2019-04-09 NOTE — ED PROVIDER NOTE - PROGRESS NOTE DETAILS
NOEMI PARISI: pt states that shes feeling a lot better,+ tolerating PO, will d/c with instructions to return for any worsening symptoms

## 2019-04-09 NOTE — ED ADULT TRIAGE NOTE - CHIEF COMPLAINT QUOTE
c/o lower abdominal pain since Yesterday AM. denies nausea or vomiting or diarrhea or dysuria or fever. NO PMH. LMP 03/08/19. Pt appears comfortable.

## 2019-04-09 NOTE — ED PROVIDER NOTE - NSFOLLOWUPINSTRUCTIONS_ED_ALL_ED_FT
pls rest, drink plenty of fluids  f/u with your gyn  return for any worsening symptoms or any other concerning symptoms

## 2019-04-10 ENCOUNTER — EMERGENCY (EMERGENCY)
Facility: HOSPITAL | Age: 39
LOS: 1 days | Discharge: HOME | End: 2019-04-10
Attending: EMERGENCY MEDICINE | Admitting: EMERGENCY MEDICINE
Payer: MEDICAID

## 2019-04-10 VITALS
RESPIRATION RATE: 16 BRPM | HEART RATE: 99 BPM | SYSTOLIC BLOOD PRESSURE: 118 MMHG | DIASTOLIC BLOOD PRESSURE: 71 MMHG | OXYGEN SATURATION: 100 %

## 2019-04-10 VITALS
SYSTOLIC BLOOD PRESSURE: 108 MMHG | RESPIRATION RATE: 18 BRPM | DIASTOLIC BLOOD PRESSURE: 56 MMHG | OXYGEN SATURATION: 100 % | TEMPERATURE: 99 F | HEART RATE: 88 BPM

## 2019-04-10 DIAGNOSIS — N13.5 CROSSING VESSEL AND STRICTURE OF URETER WITHOUT HYDRONEPHROSIS: Chronic | ICD-10-CM

## 2019-04-10 DIAGNOSIS — N13.30 UNSPECIFIED HYDRONEPHROSIS: Chronic | ICD-10-CM

## 2019-04-10 DIAGNOSIS — Z87.448 PERSONAL HISTORY OF OTHER DISEASES OF URINARY SYSTEM: Chronic | ICD-10-CM

## 2019-04-10 DIAGNOSIS — N13.30 UNSPECIFIED HYDRONEPHROSIS: ICD-10-CM

## 2019-04-10 LAB
ALBUMIN SERPL ELPH-MCNC: 3.8 G/DL — SIGNIFICANT CHANGE UP (ref 3.3–5)
ALP SERPL-CCNC: 70 U/L — SIGNIFICANT CHANGE UP (ref 40–120)
ALT FLD-CCNC: 9 U/L — SIGNIFICANT CHANGE UP (ref 4–33)
ANION GAP SERPL CALC-SCNC: 10 MMO/L — SIGNIFICANT CHANGE UP (ref 7–14)
APPEARANCE UR: CLEAR — SIGNIFICANT CHANGE UP
AST SERPL-CCNC: 12 U/L — SIGNIFICANT CHANGE UP (ref 4–32)
BACTERIA # UR AUTO: NEGATIVE — SIGNIFICANT CHANGE UP
BASOPHILS # BLD AUTO: 0.02 K/UL — SIGNIFICANT CHANGE UP (ref 0–0.2)
BASOPHILS NFR BLD AUTO: 0.2 % — SIGNIFICANT CHANGE UP (ref 0–2)
BILIRUB SERPL-MCNC: 0.3 MG/DL — SIGNIFICANT CHANGE UP (ref 0.2–1.2)
BILIRUB UR-MCNC: NEGATIVE — SIGNIFICANT CHANGE UP
BLOOD UR QL VISUAL: SIGNIFICANT CHANGE UP
BUN SERPL-MCNC: 10 MG/DL — SIGNIFICANT CHANGE UP (ref 7–23)
CALCIUM SERPL-MCNC: 8.7 MG/DL — SIGNIFICANT CHANGE UP (ref 8.4–10.5)
CHLORIDE SERPL-SCNC: 104 MMOL/L — SIGNIFICANT CHANGE UP (ref 98–107)
CO2 SERPL-SCNC: 24 MMOL/L — SIGNIFICANT CHANGE UP (ref 22–31)
COLOR SPEC: SIGNIFICANT CHANGE UP
CREAT SERPL-MCNC: 0.74 MG/DL — SIGNIFICANT CHANGE UP (ref 0.5–1.3)
EOSINOPHIL # BLD AUTO: 0.05 K/UL — SIGNIFICANT CHANGE UP (ref 0–0.5)
EOSINOPHIL NFR BLD AUTO: 0.5 % — SIGNIFICANT CHANGE UP (ref 0–6)
GLUCOSE SERPL-MCNC: 92 MG/DL — SIGNIFICANT CHANGE UP (ref 70–99)
GLUCOSE UR-MCNC: NEGATIVE — SIGNIFICANT CHANGE UP
HCG UR-SCNC: NEGATIVE — SIGNIFICANT CHANGE UP
HCT VFR BLD CALC: 35.8 % — SIGNIFICANT CHANGE UP (ref 34.5–45)
HGB BLD-MCNC: 11.3 G/DL — LOW (ref 11.5–15.5)
HYALINE CASTS # UR AUTO: NEGATIVE — SIGNIFICANT CHANGE UP
IMM GRANULOCYTES NFR BLD AUTO: 0.2 % — SIGNIFICANT CHANGE UP (ref 0–1.5)
KETONES UR-MCNC: NEGATIVE — SIGNIFICANT CHANGE UP
LEUKOCYTE ESTERASE UR-ACNC: NEGATIVE — SIGNIFICANT CHANGE UP
LYMPHOCYTES # BLD AUTO: 1.37 K/UL — SIGNIFICANT CHANGE UP (ref 1–3.3)
LYMPHOCYTES # BLD AUTO: 13.5 % — SIGNIFICANT CHANGE UP (ref 13–44)
MCHC RBC-ENTMCNC: 27.5 PG — SIGNIFICANT CHANGE UP (ref 27–34)
MCHC RBC-ENTMCNC: 31.6 % — LOW (ref 32–36)
MCV RBC AUTO: 87.1 FL — SIGNIFICANT CHANGE UP (ref 80–100)
MONOCYTES # BLD AUTO: 0.62 K/UL — SIGNIFICANT CHANGE UP (ref 0–0.9)
MONOCYTES NFR BLD AUTO: 6.1 % — SIGNIFICANT CHANGE UP (ref 2–14)
NEUTROPHILS # BLD AUTO: 8.04 K/UL — HIGH (ref 1.8–7.4)
NEUTROPHILS NFR BLD AUTO: 79.5 % — HIGH (ref 43–77)
NITRITE UR-MCNC: NEGATIVE — SIGNIFICANT CHANGE UP
NRBC # FLD: 0.02 K/UL — SIGNIFICANT CHANGE UP (ref 0–0)
PH UR: 6.5 — SIGNIFICANT CHANGE UP (ref 5–8)
PLATELET # BLD AUTO: 228 K/UL — SIGNIFICANT CHANGE UP (ref 150–400)
PMV BLD: 9.4 FL — SIGNIFICANT CHANGE UP (ref 7–13)
POTASSIUM SERPL-MCNC: 4.1 MMOL/L — SIGNIFICANT CHANGE UP (ref 3.5–5.3)
POTASSIUM SERPL-SCNC: 4.1 MMOL/L — SIGNIFICANT CHANGE UP (ref 3.5–5.3)
PROT SERPL-MCNC: 7.2 G/DL — SIGNIFICANT CHANGE UP (ref 6–8.3)
PROT UR-MCNC: NEGATIVE — SIGNIFICANT CHANGE UP
RBC # BLD: 4.11 M/UL — SIGNIFICANT CHANGE UP (ref 3.8–5.2)
RBC # FLD: 15 % — HIGH (ref 10.3–14.5)
RBC CASTS # UR COMP ASSIST: HIGH (ref 0–?)
SODIUM SERPL-SCNC: 138 MMOL/L — SIGNIFICANT CHANGE UP (ref 135–145)
SP GR SPEC: 1.02 — SIGNIFICANT CHANGE UP (ref 1–1.04)
SQUAMOUS # UR AUTO: SIGNIFICANT CHANGE UP
UROBILINOGEN FLD QL: NORMAL — SIGNIFICANT CHANGE UP
WBC # BLD: 10.12 K/UL — SIGNIFICANT CHANGE UP (ref 3.8–10.5)
WBC # FLD AUTO: 10.12 K/UL — SIGNIFICANT CHANGE UP (ref 3.8–10.5)
WBC UR QL: SIGNIFICANT CHANGE UP (ref 0–?)

## 2019-04-10 PROCEDURE — 99285 EMERGENCY DEPT VISIT HI MDM: CPT

## 2019-04-10 PROCEDURE — 76830 TRANSVAGINAL US NON-OB: CPT | Mod: 26

## 2019-04-10 PROCEDURE — 76770 US EXAM ABDO BACK WALL COMP: CPT | Mod: 26

## 2019-04-10 RX ORDER — KETOROLAC TROMETHAMINE 30 MG/ML
30 SYRINGE (ML) INJECTION ONCE
Qty: 0 | Refills: 0 | Status: DISCONTINUED | OUTPATIENT
Start: 2019-04-10 | End: 2019-04-10

## 2019-04-10 RX ORDER — MORPHINE SULFATE 50 MG/1
4 CAPSULE, EXTENDED RELEASE ORAL ONCE
Qty: 0 | Refills: 0 | Status: DISCONTINUED | OUTPATIENT
Start: 2019-04-10 | End: 2019-04-10

## 2019-04-10 RX ORDER — IBUPROFEN 200 MG
1 TABLET ORAL
Qty: 30 | Refills: 0
Start: 2019-04-10 | End: 2019-04-19

## 2019-04-10 RX ADMIN — Medication 30 MILLIGRAM(S): at 21:01

## 2019-04-10 RX ADMIN — MORPHINE SULFATE 4 MILLIGRAM(S): 50 CAPSULE, EXTENDED RELEASE ORAL at 13:37

## 2019-04-10 NOTE — ED ADULT TRIAGE NOTE - CHIEF COMPLAINT QUOTE
c/o mid abdominal pain radiates to back  painful urination x 2-3 days LMP: 04/09/2019 c/o mid abdominal pain radiates to back and painful urination x 2-3 days LMP: 04/09/2019

## 2019-04-10 NOTE — CONSULT NOTE ADULT - ASSESSMENT
39y female with L adnexal cyst causing L hydro and L flank pain 39y female with L adnexal cyst causing L hydro and L flank pain in the setting of a prior distal ureteral stricture and a left UPJO s/p robotic assisted laparoscopic pyeloplasty.    - pain control  - Gynecology consultation to evaluate possible medical management for left adnexal cyst and OCPs  - IV hydration   - will need outpatient evaluation and possible outpatient ureteroscopy with retrograde pyelography to re-evaluate ureteral caliber  - no immediate intervention unless patient develops intractable nausea/vomiting, fevers/chills, or inability to tolerate PO intake  - D/w Dr. Jess Madera  - Please call with questions #[phone]

## 2019-04-10 NOTE — ED PROVIDER NOTE - OBJECTIVE STATEMENT
40 y/o F with friend at bedside.  Previous [facility] records reviewed. Noted history of left ureteral stricture s/p nephrostomy and ureteral stent in past, both removed.   Dr. Olivas.  Developed abd pain on Monday.  To [facility] ED Mon night/Tues morning where CT done, results reviewed - left adnexal cyst and hydronephrosis.  Patient reports has had increasing pain since then, now mostly left flank and suprapubic areas.  + burning dysuria.  Denies hematuria or pyuria.  LMP - started yesterday.  No fever.  No n/v/d.  Took advil today with insufficient pain relief. 40 y/o F with friend at bedside.  Previous [facility] records reviewed. Noted history of left ureteral stricture s/p nephrostomy and ureteral stent in past, both removed.   Dr. Madera.  Developed abd pain on Monday.  To [facility] ED Mon night/Tues morning where CT done, results reviewed - left adnexal cyst and hydronephrosis.  Patient reports has had increasing pain since then, now mostly left flank and suprapubic areas.  + burning dysuria.  Denies hematuria or pyuria.  LMP - started yesterday.  No fever.  No n/v/d.  Took advil today with insufficient pain relief.

## 2019-04-10 NOTE — CONSULT NOTE ADULT - SUBJECTIVE AND OBJECTIVE BOX
HPI:  39y F s/p L robotic pyeloplasty 2017 complicated by development of L ureteral stricture which required IR placement of antegrade L stent and Neph tube. In 3/2018 She underwent a repeat ureteroscopy, Left balloon dilation of the stricture, left ureteral stent placement and biopsy of a ureteral mass (negative),  Neph tube and stent were removed in 2018.  Has been having intermittent discomfort in L flank for months but this past Monday pain became much worse and prompted her to go to ER at [facility].  Her CT showed a left adnexal cyst and L mod hydronephrosis.  Patient reports continued pain since then, now mostly left flank and suprapubic areas.  Denies hematuria or pyuria.  LMP - started yesterday.  No fever.  No n/v/d.  Took advil today with insufficient pain relief.    PAST MEDICAL & SURGICAL HISTORY:  Ureteral stricture: (left percutaneous nephrostomy and ureteral stent placed 18)  Seasonal allergies  Hydronephrosis, left  Acquired ureteral stricture  History of nephrostomy: (Left percutaneous nephrostomy and ureteral stent placed 18)  Acquired hydronephrosis: left pyeloplasty 2017      MEDICATIONS:    FAMILY HISTORY:      Allergies    No Known Allergies      SOCIAL HISTORY: Never Smoked    REVIEW OF SYSTEMS: Otherwise negative as stated in HPI      Vital Signs Last 24 Hrs  T(C): 37.1 (10 Apr 2019 11:06), Max: 37.1 (10 Apr 2019 11:06)  T(F): 98.8 (10 Apr 2019 11:06), Max: 98.8 (10 Apr 2019 11:06)  HR: 88 (10 Apr 2019 11:06) (88 - 88)  BP: 108/56 (10 Apr 2019 11:06) (108/56 - 108/56)  BP(mean): --  RR: 18 (10 Apr 2019 11:06) (18 - 18)  SpO2: 100% (10 Apr 2019 11:06) (100% - 100%)    PHYSICAL EXAM:    General:	[x ] Awake and Alert in no acute distress    Respiratory and Thorax: [x  ] no resp distress   	  Cardiovascular: [x ] S1,S2 Reg Rate    Gastrointestinal: [x ]soft, mild suprapubic tenderness   CVAT [x ]Y  [ ]N  /    [x ]L  [ ]R                            LABS:                        11.3   10.12 )-----------( 228      ( 10 Apr 2019 13:30 )             35.8     04-10    138  |  104  |  10  ----------------------------<  92  4.1   |  24  |  0.74    Ca    8.7      10 Apr 2019 13:30    TPro  7.2  /  Alb  3.8  /  TBili  0.3  /  DBili  x   /  AST  12  /  ALT  9   /  AlkPhos  70  04-10      Urinalysis Basic - ( 10 Apr 2019 13:30 )    Color: LIGHT YELLOW / Appearance: CLEAR / S.016 / pH: 6.5  Gluc: NEGATIVE / Ketone: NEGATIVE  / Bili: NEGATIVE / Urobili: NORMAL   Blood: SMALL / Protein: NEGATIVE / Nitrite: NEGATIVE   Leuk Esterase: NEGATIVE / RBC: 26-50 / WBC 0-2   Sq Epi: OCC / Non Sq Epi: x / Bacteria: NEGATIVE      URINE CX: pending    RADIOLOGY:    < from: CT Abdomen and Pelvis w/ Oral Cont and w/ IV Cont (19 @ 03:59) >    EXAM:  CT ABDOMEN AND PELVIS OC IC        PROCEDURE DATE:  2019         INTERPRETATION:  CLINICAL HISTORY: Abdominal pain. Evaluate for   appendicitis    TECHNIQUE:  CT scan of the abdomen and pelvis with IV contrast.  Transaxial images wereacquired from the domes of the diaphragm to the   symphysis pubis with intravenous contrast. Oral contrast was   administered. Coronal and sagittal images were also provided from the   transaxial source data. 90mLs of Omnipaque 350 was administered   intravenously without complication and 10 mLs was discarded.    COMPARISON: CT of the abdomen and pelvis from 3/16/2018    FINDINGS:   The heart is not enlarged. The lung bases are clear.     The large and small bowel are normal in caliber without obstruction.   There is no free intraperitoneal air or abdominal abscess.  The appendix   is normal. There is no abnormal bowel wall thickening or inflammatory   change.    The liver, gallbladder, spleen, pancreas and adrenal glands are   unremarkable. There has been interval removal of left-sided   nephroureteral stent and nephrostomy tube. There is moderate left   hydroureteronephrosis without obstructing ureteral calculus or gross   mural mass.    The abdominal aorta is normal in caliber. There is no retroperitoneal,   pelvic or inguinal adenopathy. There is no ascites.    The urinary bladder is unremarkable. There is a peripherally enhancing   4.4 cm left adnexal cyst within adjacent 2.6 cm cyst without peripheral   enhancement. The uterus and right adnexal grossly unremarkable.    The visualized osseous structures demonstrate no acute abnormality.    IMPRESSION:   Normal appendix.  4.4 cm left adnexal cyst. Follow-up pelvic sonogram in 6 weeks is   recommended to ensure resolution or stability.  Interval removal of left nephroureteral stent and nephrostomy tube.    Moderate left hydroureteronephrosis without obstructing stone.                     MAHOGANY BARROS M.D., RADIOLOGIST  This document has been electronically signed.   4:26AM

## 2019-04-10 NOTE — ED PROVIDER NOTE - CLINICAL SUMMARY MEDICAL DECISION MAKING FREE TEXT BOX
40 y/o F hx ureteral stricture.  c/o lower abd pain since Monday, now mostly left flank.  + dysuria.  CT result yesterday showed left hydronephrosis.  Check labs, BUN/Cr, u/a, ucg.  Renal u/s to eval for hydro.

## 2019-04-10 NOTE — ED PROVIDER NOTE - PROGRESS NOTE DETAILS
u/s result reviewed.  I discussed with  consult who reviewed images and feels hydronephrosis is being caused by large adnexal cyst.  GYN consulted.  Requested pelvic ultrasound to talat.

## 2019-04-10 NOTE — CONSULT NOTE ADULT - SUBJECTIVE AND OBJECTIVE BOX
R2 GYNECOLOGIC CONSULT NOTE    39y G  P  Last Menstrual Period    presents with    OB/GYN HISTORY:   OBGYN=    Past Medical History:   Ureteral stricture  Seasonal allergies  Hydronephrosis, left        Surgical History:    Acquired ureteral stricture  History of nephrostomy  Acquired hydronephrosis  No significant past surgical history      Medications:      Allergies  No Known Allergies      Social History:     Psych History:    Familiy History:  FAMILY HISTORY:      REVIEW OF SYSTEMS: negative, except stated in HPI    OBJECTIVE FINDINGS:    Vital Signs Last 24 Hrs  T(C): 37.1 (10 Apr 2019 11:06), Max: 37.1 (10 Apr 2019 11:06)  T(F): 98.8 (10 Apr 2019 11:06), Max: 98.8 (10 Apr 2019 11:06)  HR: 88 (10 Apr 2019 11:06) (88 - 88)  BP: 108/56 (10 Apr 2019 11:06) (108/56 - 108/56)  BP(mean): --  RR: 18 (10 Apr 2019 11:06) (18 - 18)  SpO2: 100% (10 Apr 2019 11:06) (100% - 100%)    PHYSICAL EXAM:    GENERAL: NAD, well-developed  HEAD:  Atraumatic, Normocephalic  EYES: EOMI, PERRLA, conjunctiva and sclera clear  ENMT: No tonsillar erythema, exudates, or enlargement; Moist mucous membranes, Good dentition, No lesions  NECK: Supple, No JVD, Normal thyroid  NERVOUS SYSTEM:  Alert & Oriented X3, Good concentration; Motor Strength 5/5 B/L upper and lower extremities; DTRs 2+ intact and symmetric  CHEST/LUNG: Clear to auscultation bilaterally; No rales, rhonchi, wheezing, or rubs  HEART: Regular rate and rhythm; No murmurs, rubs, or gallops  ABDOMEN: Soft, Nontender, Nondistended; Bowel sounds present, No rebound, No guarding  EXTREMITIES:  2+ Peripheral Pulses, No clubbing, cyanosis, or edema, Jose's sign negative  LYMPH: No lymphadenopathy noted  SKIN: No rashes or lesions  PELVIC:   -Inspection: no vulvar or labial lesions, erythema.   -Speculum exam: **vaginal bleeding, **cervix, **vaginal discharge  -Bimanual exam: uterus **, adnexa **  RECTAL: rectovaginal septum thin without nodularity      LABS:                        11.3   10 )-----------( 228      ( 10 Apr 2019 13:30 )             35.8     04-10    138  |  104  |  10  ----------------------------<  92  4.1   |  24  |  0.74    Ca    8.7      10 Apr 2019 13:30    TPro  7.2  /  Alb  3.8  /  TBili  0.3  /  DBili  x   /  AST  12  /  ALT  9   /  AlkPhos  70  04-10      Urinalysis Basic - ( 10 Apr 2019 13:30 )    Color: LIGHT YELLOW / Appearance: CLEAR / S.016 / pH: 6.5  Gluc: NEGATIVE / Ketone: NEGATIVE  / Bili: NEGATIVE / Urobili: NORMAL   Blood: SMALL / Protein: NEGATIVE / Nitrite: NEGATIVE   Leuk Esterase: NEGATIVE / RBC: 26-50 / WBC 0-2   Sq Epi: OCC / Non Sq Epi: x / Bacteria: NEGATIVE        RADIOLOGY & ADDITIONAL STUDIES: R2 GYNECOLOGIC CONSULT NOTE    39y G0, LMP 19, presents for a 2nd time with L flank pain. PMSH significant for L ureteral stricture 2/2 benign fibroepithelial polyp s/p robotic pyeloplasty, also s/p nephrostomy and ureteral stent. On Monday, CTAP performed showing L hydronephrosis and an incidental finding of a L 4.4 cm complex adnexal cyst. GYN consulted to r/o mass effect of ovarian cyst on ureter.     Pt states she has mild abdominal pain, that is "cramping" in nature. Pt did start her menses yesterday. She took advil with minimal relief. Denies fever, chills, N/V, diarrhea,    OB/GYN HISTORY:   OBGYN=pt has scheduled appt on 19  denies h/o fibroids, previous ovarian cysts, abl paps, STIs    Past Medical History:   Ureteral stricture  Seasonal allergies  Hydronephrosis, left        Surgical History:    Acquired ureteral stricture  History of nephrostomy  Acquired hydronephrosis      Medications:  denies      Allergies  No Known Allergies      Social History:   denies        REVIEW OF SYSTEMS: negative, except stated in HPI    OBJECTIVE FINDINGS:    Vital Signs Last 24 Hrs  T(C): 37.1 (10 Apr 2019 11:06), Max: 37.1 (10 Apr 2019 11:06)  T(F): 98.8 (10 Apr 2019 11:06), Max: 98.8 (10 Apr 2019 11:06)  HR: 88 (10 Apr 2019 11:06) (88 - 88)  BP: 108/56 (10 Apr 2019 11:06) (108/56 - 108/56)  BP(mean): --  RR: 18 (10 Apr 2019 11:06) (18 - 18)  SpO2: 100% (10 Apr 2019 11:06) (100% - 100%)    PHYSICAL EXAM:    GENERAL: NAD, well-developed  HEAD:  Atraumatic, Normocephalic  ABDOMEN: Soft, Nontender, Nondistended; Bowel sounds present, No rebound, No guarding. L CVA tenderness  EXTREMITIES:  2+ Peripheral Pulses, No clubbing, cyanosis, or edema, Jose's sign negative  -Inspection: no vulvar or labial lesions, erythema.   -Speculum exam: moderate vaginal bleeding 2/2 menses, closed cervix,, no cMT  -Bimanual exam: uterus anteverted, mobile, adnexa nonpalpable/nontended b/l      LABS:                        11.3   10.12 )-----------( 228      ( 10 Apr 2019 13:30 )             35.8     04-10    138  |  104  |  10  ----------------------------<  92  4.1   |  24  |  0.74    Ca    8.7      10 Apr 2019 13:30    TPro  7.2  /  Alb  3.8  /  TBili  0.3  /  DBili  x   /  AST  12  /  ALT  9   /  AlkPhos  70  04-10      Urinalysis Basic - ( 10 Apr 2019 13:30 )    Color: LIGHT YELLOW / Appearance: CLEAR / S.016 / pH: 6.5  Gluc: NEGATIVE / Ketone: NEGATIVE  / Bili: NEGATIVE / Urobili: NORMAL   Blood: SMALL / Protein: NEGATIVE / Nitrite: NEGATIVE   Leuk Esterase: NEGATIVE / RBC: 26-50 / WBC 0-2   Sq Epi: OCC / Non Sq Epi: x / Bacteria: NEGATIVE        RADIOLOGY & ADDITIONAL STUDIES:  < from: CT Abdomen and Pelvis w/ Oral Cont and w/ IV Cont (19 @ 03:59) >  EXAM:  CT ABDOMEN AND PELVIS OC IC        PROCEDURE DATE:  2019         INTERPRETATION:  CLINICAL HISTORY: Abdominal pain. Evaluate for   appendicitis    TECHNIQUE:  CT scan of the abdomen and pelvis with IV contrast.  Transaxial images wereacquired from the domes of the diaphragm to the   symphysis pubis with intravenous contrast. Oral contrast was   administered. Coronal and sagittal images were also provided from the   transaxial source data. 90mLs of Omnipaque 350 was administered   intravenously without complication and 10 mLs was discarded.    COMPARISON: CT of the abdomen and pelvis from 3/16/2018    FINDINGS:   The heart is not enlarged. The lung bases are clear.     The large and small bowel are normal in caliber without obstruction.   There is no free intraperitoneal air or abdominal abscess.  The appendix   is normal. There is no abnormal bowel wall thickening or inflammatory   change.    The liver, gallbladder, spleen, pancreas and adrenal glands are   unremarkable. There has been interval removal of left-sided   nephroureteral stent and nephrostomy tube. There is moderate left   hydroureteronephrosis without obstructing ureteral calculus or gross   mural mass.    The abdominal aorta is normal in caliber. There is no retroperitoneal,   pelvic or inguinal adenopathy. There is no ascites.    The urinary bladder is unremarkable. There is a peripherally enhancing   4.4 cm left adnexal cyst within adjacent 2.6 cm cyst without peripheral   enhancement. The uterus and right adnexal grossly unremarkable.    The visualized osseous structures demonstrate no acute abnormality.    IMPRESSION:   Normal appendix.  4.4 cm left adnexal cyst. Follow-up pelvic sonogram in 6 weeks is   recommended to ensure resolution or stability.  Interval removal of left nephroureteral stent and nephrostomy tube.    Moderate left hydroureteronephrosis without obstructing stone.     < end of copied text >    < from: US Transvaginal (.10.19 @ 19:41) >  EXAM:  US TRANSVAGINAL        PROCEDURE DATE:  Apr 10 2019         INTERPRETATION:  CLINICAL INFORMATION: Left flank pain and dysuria. Left   adnexal cyst seen on CT from 2019.    LMP: 2019.    COMPARISON: CT abdomen and pelvis from 2019.    TECHNIQUE:     Transvaginal pelvic sonogram.    FINDINGS:    Uterus: 6.6 x 4.3 x 4.3 cm. An 8 mm intramural fibroid posteriorly.     Endometrium: 9 mm. Within normal limits. Trace fluid in the endometrial   canal.    Right ovary: 5.4 x 4.2 x 2.8cm. Contains a complex cyst of 4 cm.   Additional physiological follicles. Normal vasculature.    Left ovary: 6.6 x 4.6 x 4.6 cm. Complex cyst of 5.7 cm. Additional   physiological follicles.     Fluid: Trace amount.    IMPRESSION:    Likely complex physiologic cysts in both ovaries. A follow-up ultrasound   within 12 weeks is advised to ensure resolution.    < end of copied text >

## 2019-04-10 NOTE — ED PROVIDER NOTE - CARE PROVIDER_API CALL
Jess Madera (MD; MPH)  Urology  [address] [place] [address]  [place] [address]  Phone: [phone]  Fax: [phone]  Follow Up Time:

## 2019-04-10 NOTE — CONSULT NOTE ADULT - PROBLEM SELECTOR RECOMMENDATION 9
Will discuss with Uro Attending and make final recc's -Recommend GYN consult while in ER  to address Adnexal Cyst which is causing       her L hydro  -Pain control   -There is no emergent indication to stent pt since she is hemodynamically stable, afebrile, and her renal function is intact.   Discussed with Dr. Madera  -  There is no

## 2019-04-10 NOTE — CONSULT NOTE ADULT - ASSESSMENT
-4 cm complex L ovarian cyst, likely 2/2 to menses, but will need continued f/u outpatient. Repeat TVUS in 6 weeks  -CT and TVUS reviewed with radiology. Ureter appears patent adjacent to ovarian cyst. Hydronephrosis likely not due to mass effect  -Pt to f/u with urology outpatient for further workup.  -Tylenol and Motrin for abdominal pain/cramping 2/2 menses    D/w Dr. Willam Inman PGY2 39y G0, LMP 4/9/19, presents for a 2nd time with L flank pain. PMSH significant for L ureteral stricture 2/2 benign fibroepithelial polyp s/p robotic pyeloplasty, also s/p nephrostomy and ureteral stent. On Monday, CTAP performed showing L hydronephrosis and an incidental finding of a L 4.4 cm complex adnexal cyst. GYN consulted to r/o mass effect of ovarian cyst on ureter. on TVUS today, b/l complex ovarian cysts noted.     -b/l complex L ovarian cyst, likely physiologic 2/2 to menses, but will need continued surveillance and  f/u outpatient. Repeat TVUS in 6 weeks. Pt to establish care in GYN clinic next week  -CT and TVUS reviewed with radiology. Ureter appears patent adjacent to ovarian cyst. Hydronephrosis likely not due to mass effect  -Pt to f/u with urology outpatient for further workup.  -Tylenol and Motrin for abdominal pain/cramping 2/2 menses    D/w Dr. Willam Inman PGY2

## 2019-04-10 NOTE — ED ADULT NURSE NOTE - OBJECTIVE STATEMENT
pt is in bed A and Ox  3 in NAD resting comfortably, pt reports "I am here for my pain this is second time I come here " reports LUQ pain denies N/V fever or chills. abd soft non distended BS present 4 quadrants.

## 2019-04-12 ENCOUNTER — APPOINTMENT (OUTPATIENT)
Dept: UROLOGY | Facility: CLINIC | Age: 39
End: 2019-04-12

## 2019-04-12 ENCOUNTER — OUTPATIENT (OUTPATIENT)
Dept: OUTPATIENT SERVICES | Facility: HOSPITAL | Age: 39
LOS: 1 days | End: 2019-04-12
Payer: SELF-PAY

## 2019-04-12 DIAGNOSIS — R35.0 FREQUENCY OF MICTURITION: ICD-10-CM

## 2019-04-12 DIAGNOSIS — N13.5 CROSSING VESSEL AND STRICTURE OF URETER WITHOUT HYDRONEPHROSIS: Chronic | ICD-10-CM

## 2019-04-12 DIAGNOSIS — Z87.448 PERSONAL HISTORY OF OTHER DISEASES OF URINARY SYSTEM: Chronic | ICD-10-CM

## 2019-04-12 DIAGNOSIS — N13.30 UNSPECIFIED HYDRONEPHROSIS: Chronic | ICD-10-CM

## 2019-04-12 PROCEDURE — G0463: CPT

## 2019-04-12 NOTE — END OF VISIT
[] : Resident [FreeTextEntry3] : Images of CT and sono reviewed. Better hydro on left than before however large ovarian cyst (measured about 6 cm by our measurement and divided - complex) which is likely related to patient 's c/o . Patient to see GYN for further eval and possible cystectomy . Could do URS to eval distal ureter however in settijng of better hydro , unlikely that this is cause of patient 's lower left quadrant pain as discussed above.

## 2019-04-12 NOTE — ASSESSMENT
[FreeTextEntry1] : This is a 40 y/o F with a hx of L UJPO s/p L pyleoplasty and left ureteroscopy and biopsy of fibroepithelial polyp presenting with LLQ pain and colicky left flank discomfort in the setting of newly diagnosed bilateral adnexal renal cysts during menses.\par \par - Planned to see Gyn next week - patient instructed to ask about OCPs to address menorrhagia and to shrink the cysts.  Will also ask Gyn team about role of ultrasound guided aspiration of cysts to see if this improves renal colic.\par \par - advised a stringent bowel regimen and aggressive hydration\par \par - patient will follow up next week to discuss a repeat ureteroscopy to surveil the ureter and further delineate management; she would like more time to think about this\par \par - d/w Dr. Hi.

## 2019-04-12 NOTE — PHYSICAL EXAM
[General Appearance - Well Nourished] : well nourished [General Appearance - Well Developed] : well developed [Normal Appearance] : normal appearance [General Appearance - In No Acute Distress] : no acute distress [Well Groomed] : well groomed [Abdomen Soft] : soft [Abdomen Hernia] : no hernia was discovered [Abdomen Mass (___ Cm)] : no abdominal mass palpated [Costovertebral Angle Tenderness] : no ~M costovertebral angle tenderness [] : no respiratory distress [Heart Rate And Rhythm] : Heart rate and rhythm were normal [Exaggerated Use Of Accessory Muscles For Inspiration] : no accessory muscle use [Oriented To Time, Place, And Person] : oriented to person, place, and time [Affect] : the affect was normal [Normal Station and Gait] : the gait and station were normal for the patient's age [Mood] : the mood was normal [Not Anxious] : not anxious [No Palpable Adenopathy] : no palpable adenopathy [No Focal Deficits] : no focal deficits [FreeTextEntry1] : mild suprapubic tenderness to palpation, no palpable mass

## 2019-04-12 NOTE — HISTORY OF PRESENT ILLNESS
[FreeTextEntry1] : 39 y/o F h/o UPJO s/p L robotic assisted pyeloplasty here for follow up. She had recurrent abdominal pain requiring repeat intervention to investigate. During URS, she had obstructive of the UVJ from what appeared to be a fibroepithelial polyp. Unfortunately during her first URS, they were unable to obtain retrograde access due to distal ureteral obstruction and she required NT placement. Antegrade nephrostogram showed patency of the UPJ. She had an antegrade stent placed and underwent repeat URS w/ ureteral Bx which was negative. \par \par Since her procedure last march, she has been relatively asymptomatic until the onset of menses this month on April 5th.  She experienced heavy bleeding and dysuria.  No hematuria or pyuria.  A CT scan in the ED showed bilateral complex adnexal cysts with ? of compression of the left ureter as she was having colicky flank pain.  She was evaluated by gynecology in the hospital who stated that she would require repeat interval ultrasound to ensure resolution but did not comment on use of oral contraceptives to shrink the cyst.  She has also been having  severe constipation.

## 2019-04-15 LAB
BACTERIA UR CULT: NORMAL
BACTERIA UR CULT: NORMAL

## 2019-04-16 DIAGNOSIS — N13.5 CROSSING VESSEL AND STRICTURE OF URETER WITHOUT HYDRONEPHROSIS: ICD-10-CM

## 2019-04-17 ENCOUNTER — RESULT REVIEW (OUTPATIENT)
Age: 39
End: 2019-04-17

## 2019-04-17 ENCOUNTER — APPOINTMENT (OUTPATIENT)
Dept: OBGYN | Facility: HOSPITAL | Age: 39
End: 2019-04-17

## 2019-04-17 ENCOUNTER — OUTPATIENT (OUTPATIENT)
Dept: OUTPATIENT SERVICES | Facility: HOSPITAL | Age: 39
LOS: 1 days | End: 2019-04-17

## 2019-04-17 ENCOUNTER — APPOINTMENT (OUTPATIENT)
Dept: OBGYN | Facility: HOSPITAL | Age: 39
End: 2019-04-17
Payer: COMMERCIAL

## 2019-04-17 ENCOUNTER — LABORATORY RESULT (OUTPATIENT)
Age: 39
End: 2019-04-17

## 2019-04-17 VITALS
WEIGHT: 179 LBS | HEIGHT: 64 IN | BODY MASS INDEX: 30.56 KG/M2 | DIASTOLIC BLOOD PRESSURE: 74 MMHG | SYSTOLIC BLOOD PRESSURE: 118 MMHG | HEART RATE: 86 BPM

## 2019-04-17 DIAGNOSIS — Z87.448 PERSONAL HISTORY OF OTHER DISEASES OF URINARY SYSTEM: Chronic | ICD-10-CM

## 2019-04-17 DIAGNOSIS — N13.5 CROSSING VESSEL AND STRICTURE OF URETER WITHOUT HYDRONEPHROSIS: Chronic | ICD-10-CM

## 2019-04-17 DIAGNOSIS — Z01.419 ENCOUNTER FOR GYNECOLOGICAL EXAMINATION (GENERAL) (ROUTINE) WITHOUT ABNORMAL FINDINGS: ICD-10-CM

## 2019-04-17 DIAGNOSIS — N13.30 UNSPECIFIED HYDRONEPHROSIS: Chronic | ICD-10-CM

## 2019-04-17 PROCEDURE — 99204 OFFICE O/P NEW MOD 45 MIN: CPT | Mod: 25,GC

## 2019-04-17 PROCEDURE — 99385 PREV VISIT NEW AGE 18-39: CPT | Mod: GC

## 2019-04-17 NOTE — PHYSICAL EXAM
[Normal] : uterus [Uterine Adnexae] : were not tender and not enlarged [Scant] : there was scant vaginal bleeding

## 2019-04-17 NOTE — COUNSELING
[STD (testing, results, tx)] : STD (testing, results, tx) [Fertility Options] : fertility options [Contraception] : contraception

## 2019-04-18 DIAGNOSIS — N83.201 UNSPECIFIED OVARIAN CYST, RIGHT SIDE: ICD-10-CM

## 2019-04-18 DIAGNOSIS — Z00.00 ENCOUNTER FOR GENERAL ADULT MEDICAL EXAMINATION WITHOUT ABNORMAL FINDINGS: ICD-10-CM

## 2019-04-18 LAB
C TRACH RRNA SPEC QL NAA+PROBE: SIGNIFICANT CHANGE UP
N GONORRHOEA RRNA SPEC QL NAA+PROBE: SIGNIFICANT CHANGE UP
SPECIMEN SOURCE: SIGNIFICANT CHANGE UP

## 2019-04-19 ENCOUNTER — APPOINTMENT (OUTPATIENT)
Dept: UROLOGY | Facility: CLINIC | Age: 39
End: 2019-04-19

## 2019-05-22 ENCOUNTER — APPOINTMENT (OUTPATIENT)
Dept: ORTHOPEDIC SURGERY | Facility: HOSPITAL | Age: 39
End: 2019-05-22

## 2019-05-22 ENCOUNTER — OUTPATIENT (OUTPATIENT)
Dept: OUTPATIENT SERVICES | Facility: HOSPITAL | Age: 39
LOS: 1 days | End: 2019-05-22
Payer: SELF-PAY

## 2019-05-22 ENCOUNTER — APPOINTMENT (OUTPATIENT)
Dept: ULTRASOUND IMAGING | Facility: CLINIC | Age: 39
End: 2019-05-22
Payer: COMMERCIAL

## 2019-05-22 ENCOUNTER — OUTPATIENT (OUTPATIENT)
Dept: OUTPATIENT SERVICES | Facility: HOSPITAL | Age: 39
LOS: 1 days | End: 2019-05-22

## 2019-05-22 VITALS
BODY MASS INDEX: 30.73 KG/M2 | HEIGHT: 64 IN | WEIGHT: 180 LBS | SYSTOLIC BLOOD PRESSURE: 116 MMHG | DIASTOLIC BLOOD PRESSURE: 77 MMHG | HEART RATE: 67 BPM

## 2019-05-22 DIAGNOSIS — M54.89 OTHER DORSALGIA: ICD-10-CM

## 2019-05-22 DIAGNOSIS — N13.5 CROSSING VESSEL AND STRICTURE OF URETER WITHOUT HYDRONEPHROSIS: Chronic | ICD-10-CM

## 2019-05-22 DIAGNOSIS — N83.201 UNSPECIFIED OVARIAN CYST, RIGHT SIDE: ICD-10-CM

## 2019-05-22 DIAGNOSIS — N13.30 UNSPECIFIED HYDRONEPHROSIS: Chronic | ICD-10-CM

## 2019-05-22 DIAGNOSIS — Z87.448 PERSONAL HISTORY OF OTHER DISEASES OF URINARY SYSTEM: Chronic | ICD-10-CM

## 2019-05-22 PROCEDURE — 76830 TRANSVAGINAL US NON-OB: CPT | Mod: 26

## 2019-05-22 PROCEDURE — G0463: CPT

## 2019-05-22 PROCEDURE — 76830 TRANSVAGINAL US NON-OB: CPT
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
## 2019-05-22 NOTE — PHYSICAL EXAM
[de-identified] : Spine: \par Skin intact, no TTP midline C/T/L/S spine, paraspinal TTP lumbar spine (L>R)\par No step offs\par No clonus\par Neg Babinski \par     L2   L3   L4   L5   S1\par R  5/5------------------->\par L  5/5-------------------->\par \par L2-S1 SILT B/L

## 2019-05-22 NOTE — REVIEW OF SYSTEMS
[Joint Pain] : no joint pain [Joint Stiffness] : no joint stiffness [Fever] : no fever [Chills] : no chills [Muscle Weakness] : no muscle weakness

## 2019-05-23 DIAGNOSIS — M54.5 LOW BACK PAIN: ICD-10-CM

## 2019-05-29 ENCOUNTER — LABORATORY RESULT (OUTPATIENT)
Age: 39
End: 2019-05-29

## 2019-05-29 ENCOUNTER — OUTPATIENT (OUTPATIENT)
Dept: OUTPATIENT SERVICES | Facility: HOSPITAL | Age: 39
LOS: 1 days | End: 2019-05-29

## 2019-05-29 ENCOUNTER — APPOINTMENT (OUTPATIENT)
Dept: OBGYN | Facility: HOSPITAL | Age: 39
End: 2019-05-29
Payer: COMMERCIAL

## 2019-05-29 VITALS
HEART RATE: 68 BPM | DIASTOLIC BLOOD PRESSURE: 67 MMHG | SYSTOLIC BLOOD PRESSURE: 114 MMHG | WEIGHT: 180.78 LBS | BODY MASS INDEX: 31.03 KG/M2

## 2019-05-29 DIAGNOSIS — Z80.49 FAMILY HISTORY OF MALIGNANT NEOPLASM OF OTHER GENITAL ORGANS: ICD-10-CM

## 2019-05-29 DIAGNOSIS — N13.30 UNSPECIFIED HYDRONEPHROSIS: Chronic | ICD-10-CM

## 2019-05-29 DIAGNOSIS — Z87.448 PERSONAL HISTORY OF OTHER DISEASES OF URINARY SYSTEM: Chronic | ICD-10-CM

## 2019-05-29 DIAGNOSIS — N13.5 CROSSING VESSEL AND STRICTURE OF URETER WITHOUT HYDRONEPHROSIS: Chronic | ICD-10-CM

## 2019-05-29 LAB
APPEARANCE UR: CLEAR — SIGNIFICANT CHANGE UP
BILIRUB UR-MCNC: NEGATIVE — SIGNIFICANT CHANGE UP
BLOOD UR QL VISUAL: NEGATIVE — SIGNIFICANT CHANGE UP
COLOR SPEC: COLORLESS — SIGNIFICANT CHANGE UP
GLUCOSE UR-MCNC: NEGATIVE — SIGNIFICANT CHANGE UP
HBV SURFACE AG SER-ACNC: NEGATIVE — SIGNIFICANT CHANGE UP
KETONES UR-MCNC: NEGATIVE — SIGNIFICANT CHANGE UP
LEUKOCYTE ESTERASE UR-ACNC: NEGATIVE — SIGNIFICANT CHANGE UP
NITRITE UR-MCNC: NEGATIVE — SIGNIFICANT CHANGE UP
PH UR: 6 — SIGNIFICANT CHANGE UP (ref 5–8)
PROT UR-MCNC: NEGATIVE — SIGNIFICANT CHANGE UP
SP GR SPEC: 1.01 — SIGNIFICANT CHANGE UP (ref 1–1.04)
T4 FREE SERPL-MCNC: 1.08 NG/DL — SIGNIFICANT CHANGE UP (ref 0.9–1.8)
TSH SERPL-MCNC: 0.64 UIU/ML — SIGNIFICANT CHANGE UP (ref 0.27–4.2)
UROBILINOGEN FLD QL: NORMAL — SIGNIFICANT CHANGE UP

## 2019-05-29 PROCEDURE — 99213 OFFICE O/P EST LOW 20 MIN: CPT | Mod: GC

## 2019-05-30 DIAGNOSIS — N97.9 FEMALE INFERTILITY, UNSPECIFIED: ICD-10-CM

## 2019-05-30 DIAGNOSIS — N83.201 UNSPECIFIED OVARIAN CYST, RIGHT SIDE: ICD-10-CM

## 2019-05-30 LAB
HCV RNA SERPL NAA DL=5-ACNC: NOT DETECTED — SIGNIFICANT CHANGE UP
HCV RNA SPEC NAA+PROBE-LOG IU: SIGNIFICANT CHANGE UP LOGIU/ML
HIV 1+2 AB+HIV1 P24 AG SERPL QL IA: SIGNIFICANT CHANGE UP
PROLACTIN SERPL-MCNC: 10.3 NG/ML — SIGNIFICANT CHANGE UP (ref 3.4–24.1)

## 2019-05-31 LAB
BACTERIA UR CULT: SIGNIFICANT CHANGE UP
RUBV IGG SER-ACNC: 11.2 INDEX — SIGNIFICANT CHANGE UP
RUBV IGG SER-IMP: POSITIVE — SIGNIFICANT CHANGE UP
SPECIMEN SOURCE: SIGNIFICANT CHANGE UP
T PALLIDUM AB TITR SER: NEGATIVE — SIGNIFICANT CHANGE UP

## 2019-07-03 ENCOUNTER — APPOINTMENT (OUTPATIENT)
Dept: OBGYN | Facility: HOSPITAL | Age: 39
End: 2019-07-03

## 2019-07-10 NOTE — END OF VISIT
[>50% of Time Spent on Counseling for ____] : Greater than 50% of the encounter time was spent on counseling for [unfilled] [] : Resident [Time Spent: ___ minutes] : I have spent [unfilled] minutes of face to face time with the patient

## 2019-07-10 NOTE — REVIEW OF SYSTEMS
[Nl] : Integumentary [Sight Problems] : no sight problems [Dizziness] : no dizziness [Depression] : no depression [Headache] : no headache [Feeling Weak] : no feelings of weakness

## 2019-07-12 ENCOUNTER — APPOINTMENT (OUTPATIENT)
Dept: RADIOLOGY | Facility: HOSPITAL | Age: 39
End: 2019-07-12

## 2019-08-05 ENCOUNTER — LABORATORY RESULT (OUTPATIENT)
Age: 39
End: 2019-08-05

## 2019-08-05 LAB
HBV SURFACE AG SER-ACNC: NONREACTIVE — SIGNIFICANT CHANGE UP
PROLACTIN SERPL-MCNC: 7.5 NG/ML — SIGNIFICANT CHANGE UP (ref 3.4–24.1)
T4 FREE SERPL-MCNC: 0.99 NG/DL — SIGNIFICANT CHANGE UP (ref 0.9–1.8)
TSH SERPL-MCNC: 0.77 UIU/ML — SIGNIFICANT CHANGE UP (ref 0.27–4.2)

## 2019-08-06 LAB
HCV RNA SERPL NAA DL=5-ACNC: NOT DETECTED IU/ML — SIGNIFICANT CHANGE UP
HCV RNA SPEC NAA+PROBE-LOG IU: SIGNIFICANT CHANGE UP LOGIU/ML
RUBV IGG SER-ACNC: 12.6 INDEX — SIGNIFICANT CHANGE UP
RUBV IGG SER-IMP: POSITIVE — SIGNIFICANT CHANGE UP

## 2019-08-09 ENCOUNTER — OUTPATIENT (OUTPATIENT)
Dept: OUTPATIENT SERVICES | Facility: HOSPITAL | Age: 39
LOS: 1 days | End: 2019-08-09
Payer: SELF-PAY

## 2019-08-09 ENCOUNTER — APPOINTMENT (OUTPATIENT)
Dept: RADIOLOGY | Facility: HOSPITAL | Age: 39
End: 2019-08-09
Payer: COMMERCIAL

## 2019-08-09 DIAGNOSIS — Z87.448 PERSONAL HISTORY OF OTHER DISEASES OF URINARY SYSTEM: Chronic | ICD-10-CM

## 2019-08-09 DIAGNOSIS — N13.30 UNSPECIFIED HYDRONEPHROSIS: Chronic | ICD-10-CM

## 2019-08-09 DIAGNOSIS — N97.9 FEMALE INFERTILITY, UNSPECIFIED: ICD-10-CM

## 2019-08-09 DIAGNOSIS — N13.5 CROSSING VESSEL AND STRICTURE OF URETER WITHOUT HYDRONEPHROSIS: Chronic | ICD-10-CM

## 2019-08-09 PROCEDURE — 74740 X-RAY FEMALE GENITAL TRACT: CPT

## 2019-08-09 PROCEDURE — 58340 CATHETER FOR HYSTEROGRAPHY: CPT

## 2019-08-09 PROCEDURE — 74740 X-RAY FEMALE GENITAL TRACT: CPT | Mod: 26

## 2019-08-28 ENCOUNTER — APPOINTMENT (OUTPATIENT)
Dept: OBGYN | Facility: HOSPITAL | Age: 39
End: 2019-08-28
Payer: COMMERCIAL

## 2019-08-28 ENCOUNTER — LABORATORY RESULT (OUTPATIENT)
Age: 39
End: 2019-08-28

## 2019-08-28 ENCOUNTER — OUTPATIENT (OUTPATIENT)
Dept: OUTPATIENT SERVICES | Facility: HOSPITAL | Age: 39
LOS: 1 days | End: 2019-08-28

## 2019-08-28 VITALS
DIASTOLIC BLOOD PRESSURE: 75 MMHG | HEART RATE: 86 BPM | WEIGHT: 179 LBS | SYSTOLIC BLOOD PRESSURE: 116 MMHG | BODY MASS INDEX: 30.56 KG/M2 | HEIGHT: 64 IN

## 2019-08-28 DIAGNOSIS — N13.30 UNSPECIFIED HYDRONEPHROSIS: Chronic | ICD-10-CM

## 2019-08-28 DIAGNOSIS — N13.5 CROSSING VESSEL AND STRICTURE OF URETER WITHOUT HYDRONEPHROSIS: Chronic | ICD-10-CM

## 2019-08-28 DIAGNOSIS — Z87.448 PERSONAL HISTORY OF OTHER DISEASES OF URINARY SYSTEM: Chronic | ICD-10-CM

## 2019-08-28 LAB
APPEARANCE UR: CLEAR — SIGNIFICANT CHANGE UP
BACTERIA # UR AUTO: NEGATIVE — SIGNIFICANT CHANGE UP
BASOPHILS # BLD AUTO: 0.04 K/UL — SIGNIFICANT CHANGE UP (ref 0–0.2)
BASOPHILS NFR BLD AUTO: 0.5 % — SIGNIFICANT CHANGE UP (ref 0–2)
BILIRUB UR-MCNC: NEGATIVE — SIGNIFICANT CHANGE UP
BLOOD UR QL VISUAL: NEGATIVE — SIGNIFICANT CHANGE UP
COLOR SPEC: SIGNIFICANT CHANGE UP
EOSINOPHIL # BLD AUTO: 0.17 K/UL — SIGNIFICANT CHANGE UP (ref 0–0.5)
EOSINOPHIL NFR BLD AUTO: 2.2 % — SIGNIFICANT CHANGE UP (ref 0–6)
GLUCOSE UR-MCNC: NEGATIVE — SIGNIFICANT CHANGE UP
HCT VFR BLD CALC: 36.4 % — SIGNIFICANT CHANGE UP (ref 34.5–45)
HGB BLD-MCNC: 11 G/DL — LOW (ref 11.5–15.5)
HYALINE CASTS # UR AUTO: NEGATIVE — SIGNIFICANT CHANGE UP
IMM GRANULOCYTES NFR BLD AUTO: 0.3 % — SIGNIFICANT CHANGE UP (ref 0–1.5)
KETONES UR-MCNC: NEGATIVE — SIGNIFICANT CHANGE UP
LEUKOCYTE ESTERASE UR-ACNC: SIGNIFICANT CHANGE UP
LYMPHOCYTES # BLD AUTO: 2.65 K/UL — SIGNIFICANT CHANGE UP (ref 1–3.3)
LYMPHOCYTES # BLD AUTO: 33.7 % — SIGNIFICANT CHANGE UP (ref 13–44)
MCHC RBC-ENTMCNC: 26 PG — LOW (ref 27–34)
MCHC RBC-ENTMCNC: 30.2 % — LOW (ref 32–36)
MCV RBC AUTO: 86.1 FL — SIGNIFICANT CHANGE UP (ref 80–100)
MONOCYTES # BLD AUTO: 0.52 K/UL — SIGNIFICANT CHANGE UP (ref 0–0.9)
MONOCYTES NFR BLD AUTO: 6.6 % — SIGNIFICANT CHANGE UP (ref 2–14)
NEUTROPHILS # BLD AUTO: 4.46 K/UL — SIGNIFICANT CHANGE UP (ref 1.8–7.4)
NEUTROPHILS NFR BLD AUTO: 56.7 % — SIGNIFICANT CHANGE UP (ref 43–77)
NITRITE UR-MCNC: NEGATIVE — SIGNIFICANT CHANGE UP
NRBC # FLD: 0 K/UL — SIGNIFICANT CHANGE UP (ref 0–0)
PH UR: 6 — SIGNIFICANT CHANGE UP (ref 5–8)
PLATELET # BLD AUTO: 304 K/UL — SIGNIFICANT CHANGE UP (ref 150–400)
PMV BLD: 9.3 FL — SIGNIFICANT CHANGE UP (ref 7–13)
PROT UR-MCNC: NEGATIVE — SIGNIFICANT CHANGE UP
RBC # BLD: 4.23 M/UL — SIGNIFICANT CHANGE UP (ref 3.8–5.2)
RBC # FLD: 14.6 % — HIGH (ref 10.3–14.5)
RBC CASTS # UR COMP ASSIST: SIGNIFICANT CHANGE UP (ref 0–?)
SP GR SPEC: 1.01 — SIGNIFICANT CHANGE UP (ref 1–1.04)
SQUAMOUS # UR AUTO: SIGNIFICANT CHANGE UP
UROBILINOGEN FLD QL: NORMAL — SIGNIFICANT CHANGE UP
WBC # BLD: 7.86 K/UL — SIGNIFICANT CHANGE UP (ref 3.8–10.5)
WBC # FLD AUTO: 7.86 K/UL — SIGNIFICANT CHANGE UP (ref 3.8–10.5)
WBC UR QL: HIGH (ref 0–?)

## 2019-08-28 PROCEDURE — 99213 OFFICE O/P EST LOW 20 MIN: CPT | Mod: GC

## 2019-08-28 RX ORDER — DOXYCYCLINE HYCLATE 100 MG/1
100 TABLET ORAL TWICE DAILY
Qty: 4 | Refills: 0 | Status: DISCONTINUED | COMMUNITY
Start: 2019-05-29 | End: 2019-08-28

## 2019-08-28 RX ORDER — DOXYCYCLINE HYCLATE 100 MG/1
100 CAPSULE ORAL TWICE DAILY
Qty: 4 | Refills: 0 | Status: DISCONTINUED | COMMUNITY
Start: 2019-08-09 | End: 2019-08-28

## 2019-08-30 LAB
BACTERIA UR CULT: SIGNIFICANT CHANGE UP
SPECIMEN SOURCE: SIGNIFICANT CHANGE UP

## 2019-09-03 DIAGNOSIS — R10.9 UNSPECIFIED ABDOMINAL PAIN: ICD-10-CM

## 2019-09-03 DIAGNOSIS — N97.9 FEMALE INFERTILITY, UNSPECIFIED: ICD-10-CM

## 2019-09-03 NOTE — REVIEW OF SYSTEMS
[Abdominal Pain] : abdominal pain [Constipation] : constipation [Fever] : no fever [Chills] : no chills [Palpitations] : no palpitations [Chest Pain] : no chest pain [Dyspnea] : no shortness of breath [Cough] : no cough [Vomiting] : no vomiting [Dysuria] : no dysuria [Pelvic Pain] : no pelvic pain [Urgency] : no urgency [Frequency] : no frequency [Dizziness] : no dizziness [Headache] : no headache

## 2019-09-03 NOTE — PHYSICAL EXAM
[Awake] : awake [Alert] : alert [Soft] : soft [Oriented x3] : oriented to person, place, and time [RRR, No Murmurs] : RRR, no murmurs [CTAB] : CTAB [No Lesions] : no genitalia lesions [Labia Majora] : labia major [Labia Minora] : labia minora [Normal] : clitoris [No Bleeding] : there was no active vaginal bleeding [Pink Rugae] : pink rugae [Nulliparous] : was nulliparous [Acute Distress] : no acute distress [Tender] : non tender [Distended] : not distended [Discharge] : had no discharge [Dilated] : the cervix was not dilated [Motion Tenderness] : there was no cervical motion tenderness [Tenderness] : nontender [Enlarged ___ wks] : not enlarged [FreeTextEntry6] : Tenderness to palpation along anterior vaginal wall

## 2019-09-16 ENCOUNTER — OUTPATIENT (OUTPATIENT)
Dept: OUTPATIENT SERVICES | Facility: HOSPITAL | Age: 39
LOS: 1 days | End: 2019-09-16
Payer: COMMERCIAL

## 2019-09-16 ENCOUNTER — APPOINTMENT (OUTPATIENT)
Dept: ULTRASOUND IMAGING | Facility: CLINIC | Age: 39
End: 2019-09-16
Payer: COMMERCIAL

## 2019-09-16 DIAGNOSIS — Z00.8 ENCOUNTER FOR OTHER GENERAL EXAMINATION: ICD-10-CM

## 2019-09-16 DIAGNOSIS — N13.30 UNSPECIFIED HYDRONEPHROSIS: Chronic | ICD-10-CM

## 2019-09-16 DIAGNOSIS — Z87.448 PERSONAL HISTORY OF OTHER DISEASES OF URINARY SYSTEM: Chronic | ICD-10-CM

## 2019-09-16 DIAGNOSIS — N13.5 CROSSING VESSEL AND STRICTURE OF URETER WITHOUT HYDRONEPHROSIS: Chronic | ICD-10-CM

## 2019-09-16 PROCEDURE — 76830 TRANSVAGINAL US NON-OB: CPT | Mod: 26

## 2019-09-16 PROCEDURE — 76830 TRANSVAGINAL US NON-OB: CPT

## 2019-10-16 NOTE — BEGINNING OF VISIT
[Patient] : patient [] :  [[employer] ] : [employer]   [FreeTextEntry1] : [id number] [FreeTextEntry2] : Trevin

## 2019-10-16 NOTE — PHYSICAL EXAM
[Awake] : awake [Alert] : alert [Soft] : soft [Tender] : tender [Flat] : flat [Suprapubic] : in the suprapubic area [Acute Distress] : no acute distress [Distended] : not distended [H/Smegaly] : no hepatosplenomegaly

## 2019-10-16 NOTE — HISTORY OF PRESENT ILLNESS
[Suprapubic] : suprapubic area [Sharp] : sharp [3/10] : is 3/10 in severity [___ Months] : started [unfilled] months ago [Definite:  ___ (Date)] : the last menstrual period was [unfilled] [Normal Amount/Duration] : was of a normal amount and duration [Regular Cycle Intervals] : periods have been regular [Frequency: Q ___ days] : menstrual periods occur approximately every [unfilled] days [Menstrual Cramps] : menstrual cramps [Sexually Active] : is sexually active [Monogamous] : is monogamous [Male ___] : [unfilled] male [Spotting Between  Menses] : no spotting between menses

## 2019-10-22 ENCOUNTER — APPOINTMENT (OUTPATIENT)
Dept: OBGYN | Facility: HOSPITAL | Age: 39
End: 2019-10-22

## 2019-10-30 ENCOUNTER — OUTPATIENT (OUTPATIENT)
Dept: OUTPATIENT SERVICES | Facility: HOSPITAL | Age: 39
LOS: 1 days | End: 2019-10-30

## 2019-10-30 ENCOUNTER — APPOINTMENT (OUTPATIENT)
Dept: OBGYN | Facility: HOSPITAL | Age: 39
End: 2019-10-30
Payer: COMMERCIAL

## 2019-10-30 VITALS
HEIGHT: 64 IN | HEART RATE: 70 BPM | BODY MASS INDEX: 30.73 KG/M2 | SYSTOLIC BLOOD PRESSURE: 122 MMHG | WEIGHT: 180 LBS | DIASTOLIC BLOOD PRESSURE: 66 MMHG

## 2019-10-30 DIAGNOSIS — N13.30 UNSPECIFIED HYDRONEPHROSIS: Chronic | ICD-10-CM

## 2019-10-30 DIAGNOSIS — N13.5 CROSSING VESSEL AND STRICTURE OF URETER WITHOUT HYDRONEPHROSIS: Chronic | ICD-10-CM

## 2019-10-30 DIAGNOSIS — Z87.448 PERSONAL HISTORY OF OTHER DISEASES OF URINARY SYSTEM: Chronic | ICD-10-CM

## 2019-10-30 DIAGNOSIS — R10.9 UNSPECIFIED ABDOMINAL PAIN: ICD-10-CM

## 2019-10-30 PROCEDURE — 99213 OFFICE O/P EST LOW 20 MIN: CPT | Mod: GC

## 2019-10-30 NOTE — COUNSELING
[Pain Management] : pain management [Other ___] : [unfilled] [Pre/Post Op Instructions] : pre/post op instructions

## 2019-10-31 DIAGNOSIS — N83.201 UNSPECIFIED OVARIAN CYST, RIGHT SIDE: ICD-10-CM

## 2019-10-31 DIAGNOSIS — R10.9 UNSPECIFIED ABDOMINAL PAIN: ICD-10-CM

## 2019-11-05 NOTE — REVIEW OF SYSTEMS
[Pelvic Pain] : pelvic pain [Nl] : Integumentary [Dysuria] : no dysuria [Abn Vag Bleeding] : no abnormal vaginal bleeding [Frequency] : no frequency [Urgency] : no urgency

## 2019-11-14 ENCOUNTER — OUTPATIENT (OUTPATIENT)
Dept: OUTPATIENT SERVICES | Facility: HOSPITAL | Age: 39
LOS: 1 days | End: 2019-11-14

## 2019-11-14 VITALS
HEIGHT: 64 IN | SYSTOLIC BLOOD PRESSURE: 100 MMHG | DIASTOLIC BLOOD PRESSURE: 70 MMHG | RESPIRATION RATE: 16 BRPM | TEMPERATURE: 99 F | HEART RATE: 61 BPM | WEIGHT: 179.9 LBS | OXYGEN SATURATION: 98 %

## 2019-11-14 DIAGNOSIS — N13.30 UNSPECIFIED HYDRONEPHROSIS: Chronic | ICD-10-CM

## 2019-11-14 DIAGNOSIS — N83.209 UNSPECIFIED OVARIAN CYST, UNSPECIFIED SIDE: ICD-10-CM

## 2019-11-14 DIAGNOSIS — N83.201 UNSPECIFIED OVARIAN CYST, RIGHT SIDE: ICD-10-CM

## 2019-11-14 DIAGNOSIS — N13.5 CROSSING VESSEL AND STRICTURE OF URETER WITHOUT HYDRONEPHROSIS: Chronic | ICD-10-CM

## 2019-11-14 DIAGNOSIS — Z87.448 PERSONAL HISTORY OF OTHER DISEASES OF URINARY SYSTEM: Chronic | ICD-10-CM

## 2019-11-14 LAB
ANION GAP SERPL CALC-SCNC: 13 MMO/L — SIGNIFICANT CHANGE UP (ref 7–14)
BLD GP AB SCN SERPL QL: NEGATIVE — SIGNIFICANT CHANGE UP
BUN SERPL-MCNC: 13 MG/DL — SIGNIFICANT CHANGE UP (ref 7–23)
CALCIUM SERPL-MCNC: 9.2 MG/DL — SIGNIFICANT CHANGE UP (ref 8.4–10.5)
CHLORIDE SERPL-SCNC: 103 MMOL/L — SIGNIFICANT CHANGE UP (ref 98–107)
CO2 SERPL-SCNC: 25 MMOL/L — SIGNIFICANT CHANGE UP (ref 22–31)
CREAT SERPL-MCNC: 0.66 MG/DL — SIGNIFICANT CHANGE UP (ref 0.5–1.3)
GLUCOSE SERPL-MCNC: 99 MG/DL — SIGNIFICANT CHANGE UP (ref 70–99)
HCG SERPL-ACNC: < 5 MIU/ML — SIGNIFICANT CHANGE UP
HCT VFR BLD CALC: 34.3 % — LOW (ref 34.5–45)
HGB BLD-MCNC: 10.4 G/DL — LOW (ref 11.5–15.5)
MCHC RBC-ENTMCNC: 25.7 PG — LOW (ref 27–34)
MCHC RBC-ENTMCNC: 30.3 % — LOW (ref 32–36)
MCV RBC AUTO: 84.7 FL — SIGNIFICANT CHANGE UP (ref 80–100)
NRBC # FLD: 0 K/UL — SIGNIFICANT CHANGE UP (ref 0–0)
PLATELET # BLD AUTO: 284 K/UL — SIGNIFICANT CHANGE UP (ref 150–400)
PMV BLD: 9.7 FL — SIGNIFICANT CHANGE UP (ref 7–13)
POTASSIUM SERPL-MCNC: 4 MMOL/L — SIGNIFICANT CHANGE UP (ref 3.5–5.3)
POTASSIUM SERPL-SCNC: 4 MMOL/L — SIGNIFICANT CHANGE UP (ref 3.5–5.3)
RBC # BLD: 4.05 M/UL — SIGNIFICANT CHANGE UP (ref 3.8–5.2)
RBC # FLD: 14.8 % — HIGH (ref 10.3–14.5)
RH IG SCN BLD-IMP: POSITIVE — SIGNIFICANT CHANGE UP
SODIUM SERPL-SCNC: 141 MMOL/L — SIGNIFICANT CHANGE UP (ref 135–145)
WBC # BLD: 6.17 K/UL — SIGNIFICANT CHANGE UP (ref 3.8–10.5)
WBC # FLD AUTO: 6.17 K/UL — SIGNIFICANT CHANGE UP (ref 3.8–10.5)

## 2019-11-14 NOTE — H&P PST ADULT - HISTORY OF PRESENT ILLNESS
39 year old female with h/o congenital ureteropelvic junction obstruction s/p pyeloplasty surgery last year presents to PST unit with pre-op diagnosis of ovarian cysts scheduled for laparoscopic bilateral ovarian cystectomy, possible oophorectomy, possible removal of diseased tissue, possible laparotomy on 11/22/2019. She reports bilateral pelvic pain that began in March s/p US and CT scan.

## 2019-11-14 NOTE — H&P PST ADULT - NSICDXPROBLEM_GEN_ALL_CORE_FT
PROBLEM DIAGNOSES  Problem: Bilateral ovarian cysts  Assessment and Plan: scheduled for laparoscopic bilateral ovarian cystectomy, possible oophorectomy, possible removal of diseased tissue, possible laparotomy on 11/22/2019.  Verbal and written pre-op instructions provided to patient. Patient verbalized understanding.   Pepcid for GI prophylaxis provided.   patient given verbal and written instruction with teach back on chlorhexidine shampoo, and the patient verbalized understanding with return demonstration.   Urine pregnancy test DOS-Urine cup provided.

## 2019-11-14 NOTE — H&P PST ADULT - NEGATIVE ENMT SYMPTOMS
no vertigo/no hearing difficulty/no tinnitus/no sinus symptoms/no throat pain/no ear pain/no dysphagia

## 2019-11-14 NOTE — H&P PST ADULT - MUSCULOSKELETAL
details… detailed exam ROM intact/no joint warmth/no calf tenderness/normal strength/no joint erythema/no joint swelling

## 2019-11-14 NOTE — H&P PST ADULT - NSICDXPASTSURGICALHX_GEN_ALL_CORE_FT
PAST SURGICAL HISTORY:  Acquired hydronephrosis left pyeloplasty 6/2017    Acquired ureteral stricture     History of nephrostomy (Left percutaneous nephrostomy and ureteral stent placed 2/21/18)

## 2019-11-14 NOTE — H&P PST ADULT - ATTENDING COMMENTS
R/a/B of procedure d/w pt including but not limited to infection, bleeding, injury to bladder/bowel/ovaries/vessels.  Discussed possibility of requiring laparotomy and/or oophorectomy

## 2019-11-14 NOTE — H&P PST ADULT - NSICDXPASTMEDICALHX_GEN_ALL_CORE_FT
PAST MEDICAL HISTORY:  Bilateral ovarian cysts     Hydronephrosis, left     Seasonal allergies     Ureteral stricture (left percutaneous nephrostomy and ureteral stent placed 2/21/18)

## 2019-11-28 ENCOUNTER — TRANSCRIPTION ENCOUNTER (OUTPATIENT)
Age: 39
End: 2019-11-28

## 2019-11-29 ENCOUNTER — OUTPATIENT (OUTPATIENT)
Dept: OUTPATIENT SERVICES | Facility: HOSPITAL | Age: 39
LOS: 1 days | Discharge: HOME | End: 2019-11-29
Payer: MEDICAID

## 2019-11-29 ENCOUNTER — RESULT REVIEW (OUTPATIENT)
Age: 39
End: 2019-11-29

## 2019-11-29 VITALS
DIASTOLIC BLOOD PRESSURE: 74 MMHG | HEART RATE: 73 BPM | OXYGEN SATURATION: 100 % | HEIGHT: 64 IN | WEIGHT: 179.9 LBS | RESPIRATION RATE: 16 BRPM | SYSTOLIC BLOOD PRESSURE: 113 MMHG | TEMPERATURE: 98 F

## 2019-11-29 VITALS
OXYGEN SATURATION: 100 % | SYSTOLIC BLOOD PRESSURE: 103 MMHG | HEART RATE: 60 BPM | DIASTOLIC BLOOD PRESSURE: 54 MMHG | RESPIRATION RATE: 17 BRPM

## 2019-11-29 DIAGNOSIS — N13.5 CROSSING VESSEL AND STRICTURE OF URETER WITHOUT HYDRONEPHROSIS: Chronic | ICD-10-CM

## 2019-11-29 DIAGNOSIS — N13.30 UNSPECIFIED HYDRONEPHROSIS: Chronic | ICD-10-CM

## 2019-11-29 DIAGNOSIS — N83.209 UNSPECIFIED OVARIAN CYST, UNSPECIFIED SIDE: ICD-10-CM

## 2019-11-29 DIAGNOSIS — Z87.448 PERSONAL HISTORY OF OTHER DISEASES OF URINARY SYSTEM: Chronic | ICD-10-CM

## 2019-11-29 PROBLEM — N83.201 UNSPECIFIED OVARIAN CYST, RIGHT SIDE: Chronic | Status: ACTIVE | Noted: 2019-11-14

## 2019-11-29 LAB
BLD GP AB SCN SERPL QL: NEGATIVE — SIGNIFICANT CHANGE UP
HCG UR QL: NEGATIVE — SIGNIFICANT CHANGE UP
RH IG SCN BLD-IMP: POSITIVE — SIGNIFICANT CHANGE UP

## 2019-11-29 PROCEDURE — 88305 TISSUE EXAM BY PATHOLOGIST: CPT | Mod: 26

## 2019-11-29 PROCEDURE — 58662 LAPAROSCOPY EXCISE LESIONS: CPT | Mod: GC

## 2019-11-29 RX ORDER — ONDANSETRON 8 MG/1
4 TABLET, FILM COATED ORAL EVERY 4 HOURS
Refills: 0 | Status: DISCONTINUED | OUTPATIENT
Start: 2019-11-29 | End: 2020-01-03

## 2019-11-29 RX ORDER — OXYCODONE HYDROCHLORIDE 5 MG/1
1 TABLET ORAL
Qty: 10 | Refills: 0
Start: 2019-11-29

## 2019-11-29 RX ORDER — SODIUM CHLORIDE 9 MG/ML
1000 INJECTION, SOLUTION INTRAVENOUS
Refills: 0 | Status: DISCONTINUED | OUTPATIENT
Start: 2019-11-29 | End: 2020-01-03

## 2019-11-29 RX ORDER — IBUPROFEN 200 MG
1 TABLET ORAL
Qty: 0 | Refills: 0 | DISCHARGE

## 2019-11-29 RX ORDER — FENTANYL CITRATE 50 UG/ML
25 INJECTION INTRAVENOUS
Refills: 0 | Status: DISCONTINUED | OUTPATIENT
Start: 2019-11-29 | End: 2019-11-29

## 2019-11-29 RX ORDER — FENTANYL CITRATE 50 UG/ML
50 INJECTION INTRAVENOUS
Refills: 0 | Status: DISCONTINUED | OUTPATIENT
Start: 2019-11-29 | End: 2019-11-29

## 2019-11-29 RX ADMIN — SODIUM CHLORIDE 125 MILLILITER(S): 9 INJECTION, SOLUTION INTRAVENOUS at 11:05

## 2019-11-29 RX ADMIN — FENTANYL CITRATE 25 MICROGRAM(S): 50 INJECTION INTRAVENOUS at 13:48

## 2019-11-29 RX ADMIN — FENTANYL CITRATE 25 MICROGRAM(S): 50 INJECTION INTRAVENOUS at 13:18

## 2019-11-29 NOTE — ASU DISCHARGE PLAN (ADULT/PEDIATRIC) - NURSING INSTRUCTIONS
No creams, lotions, ointments to incision site unless directed by surgeon. Do not scrub or rub incision while showering. Gently pat dry after shower.

## 2019-11-29 NOTE — ASU DISCHARGE PLAN (ADULT/PEDIATRIC) - CALL YOUR DOCTOR IF YOU HAVE ANY OF THE FOLLOWING:
Fever greater than (need to indicate Fahrenheit or Celsius)/Nausea and vomiting that does not stop/Wound/Surgical Site with redness, or foul smelling discharge or pus/Inability to tolerate liquids or foods/Pain not relieved by Medications/Bleeding that does not stop Fever greater than (need to indicate Fahrenheit or Celsius)/Inability to tolerate liquids or foods/Wound/Surgical Site with redness, or foul smelling discharge or pus/Unable to urinate/Nausea and vomiting that does not stop/Bleeding that does not stop/Pain not relieved by Medications

## 2019-11-29 NOTE — ASU DISCHARGE PLAN (ADULT/PEDIATRIC) - ACTIVITY LEVEL
Nothing per rectum/Nothing per vagina/No tub baths/No intercourse/No excercise/No douching/No tampons

## 2019-11-29 NOTE — ASU DISCHARGE PLAN (ADULT/PEDIATRIC) - ASU DC SPECIAL INSTRUCTIONSFT
Expect abdominal cramping/pain and spotting for the next weeks. Take ibuprofen 600 and Tylenol 1000 every 6 hours for pain/cramping. Take Oxycodone as needed for breakthrough pain. Use a pad as needed. Call your physician or go to the emergency room if you experience any of the following: heavy vaginal bleeding (soaking more than 2 pads in 1 hour for 2 hours), fever, chills, nausea, vomiting, or pain that is not controlled by medication. Follow-up with [facility] in 2 weeks.

## 2019-11-29 NOTE — BRIEF OPERATIVE NOTE - NSICDXBRIEFPOSTOP_GEN_ALL_CORE_FT
POST-OP DIAGNOSIS:  Hemorrhagic cyst of left ovary 29-Nov-2019 11:11:23  Mere Chance  Endometriosis 29-Nov-2019 11:11:14  Mere Chance

## 2019-11-29 NOTE — BRIEF OPERATIVE NOTE - OPERATION/FINDINGS
EUA revealed large size uterus, with inability to differentiate between uterine size and adnexa. Upon laparoscopy, noted to be clotted blood in the plevis. Left ovarian cyst adhered to uterus and left pelvic side wall. Right ovary visualized and normal, with inability to visualize uterus and left tube.

## 2019-12-04 LAB — SURGICAL PATHOLOGY STUDY: SIGNIFICANT CHANGE UP

## 2019-12-07 NOTE — H&P PST ADULT - IS PATIENT PREGNANT?
OFFICE VISIT      Patient: Tamara Conner Date of Service: 2019   : 1939 MRN: [id number]     SUBJECTIVE:   HISTORY OF PRESENT ILLNESS:  Tamara Conner is a 80 year old female who presents today for sinus pain and pressure, fever.  HPI    PAST MEDICAL HISTORY:  No past medical history on file.    MEDICATIONS:  No current outpatient medications on file.     No current facility-administered medications for this visit.        ALLERGIES:  ALLERGIES:  No Known Allergies    PAST SURGICAL HISTORY:  No past surgical history on file.    FAMILY HISTORY:  History reviewed. No pertinent family history.    SOCIAL HISTORY:  Social History     Tobacco Use   • Smoking status: Never Smoker   • Smokeless tobacco: Never Used   Substance Use Topics   • Alcohol use: Not on file   • Drug use: Not on file       Review of Systems   Constitutional: Positive for chills, fatigue and fever. Negative for diaphoresis.   HENT: Positive for congestion, facial swelling, sinus pressure and sinus pain. Negative for ear discharge and ear pain.    Eyes: Negative.    Respiratory: Negative.    Cardiovascular: Negative.    Gastrointestinal: Negative.    Skin: Negative for rash.   Neurological: Positive for headaches.         OBJECTIVE:     Physical Exam   Constitutional: She is oriented to person, place, and time. Vital signs are normal. She appears well-developed and well-nourished.   HENT:   Head: Normocephalic and atraumatic.   Right Ear: A middle ear effusion is present.   Left Ear: A middle ear effusion is present.   Nose: Right sinus exhibits frontal sinus tenderness. Right sinus exhibits no maxillary sinus tenderness. Left sinus exhibits frontal sinus tenderness. Left sinus exhibits no maxillary sinus tenderness.   Mouth/Throat: Uvula is midline and mucous membranes are normal. Posterior oropharyngeal erythema present.   Eyes: Pupils are equal, round, and reactive to light. Conjunctivae and EOM are normal.   Neck: Normal range of motion.  Neck supple.   Cardiovascular: Normal rate.   Pulmonary/Chest: Effort normal.   Lymphadenopathy:     She has cervical adenopathy.        Right cervical: Deep cervical adenopathy present.        Left cervical: Deep cervical adenopathy present.   Neurological: She is alert and oriented to person, place, and time.   Skin: Skin is warm and dry. Rash noted.       Visit Vitals  /72 (BP Location: LFA - Left forearm, Patient Position: Sitting, Cuff Size: Regular)   Pulse 71   Temp 98 °F (36.7 °C) (Oral)   Resp 15   Ht 4' 9.48\" (1.46 m)   Wt 71.3 kg (157 lb 4.8 oz)   SpO2 98%   BMI 33.47 kg/m²       DIAGNOSTIC STUDIES:   LAB RESULTS:  No results found for this visit on 12/07/19.    Assessment AND PLAN:   This is a 80 year old year-old female who presents with sinus pain and pressure, fever.    No diagnosis found.    No follow-ups on file.    Instructions provided as documented in the AVS.          The patient and daughter(s) indicated understanding of the diagnosis and agreed with the plan of care.         no

## 2019-12-16 ENCOUNTER — OUTPATIENT (OUTPATIENT)
Dept: OUTPATIENT SERVICES | Facility: HOSPITAL | Age: 39
LOS: 1 days | End: 2019-12-16

## 2019-12-16 ENCOUNTER — APPOINTMENT (OUTPATIENT)
Dept: OBGYN | Facility: HOSPITAL | Age: 39
End: 2019-12-16
Payer: COMMERCIAL

## 2019-12-16 VITALS
HEART RATE: 60 BPM | WEIGHT: 181.22 LBS | DIASTOLIC BLOOD PRESSURE: 62 MMHG | HEIGHT: 64 IN | BODY MASS INDEX: 30.94 KG/M2 | SYSTOLIC BLOOD PRESSURE: 111 MMHG

## 2019-12-16 DIAGNOSIS — N13.5 CROSSING VESSEL AND STRICTURE OF URETER WITHOUT HYDRONEPHROSIS: Chronic | ICD-10-CM

## 2019-12-16 DIAGNOSIS — Z87.448 PERSONAL HISTORY OF OTHER DISEASES OF URINARY SYSTEM: Chronic | ICD-10-CM

## 2019-12-16 DIAGNOSIS — N13.30 UNSPECIFIED HYDRONEPHROSIS: Chronic | ICD-10-CM

## 2019-12-16 PROCEDURE — 99024 POSTOP FOLLOW-UP VISIT: CPT

## 2019-12-17 DIAGNOSIS — Z48.89 ENCOUNTER FOR OTHER SPECIFIED SURGICAL AFTERCARE: ICD-10-CM

## 2019-12-26 ENCOUNTER — APPOINTMENT (OUTPATIENT)
Dept: GASTROENTEROLOGY | Facility: CLINIC | Age: 39
End: 2019-12-26

## 2020-01-15 ENCOUNTER — APPOINTMENT (OUTPATIENT)
Dept: OBGYN | Facility: HOSPITAL | Age: 40
End: 2020-01-15

## 2020-01-30 NOTE — ED ADULT TRIAGE NOTE - TEMPERATURE IN FAHRENHEIT (DEGREES F)
POD#1 CE/IOL OS (Standard) Mild corneal edema noted otherwise doing well. Use Prednisolone BID OS Prolensa Qdaily OS Ocuflox TID OS : Use all three gtts through completion of PO gtt chart regimen/ Per our instructions given to patient.   Post op Warnings Reiterated RTC as scheduled
98.1

## 2020-02-21 NOTE — CHIEF COMPLAINT
[Post Op Day: ___] : post op day #[unfilled] [Procedure: ___] : status post [unfilled] [Indication: ___] : for [unfilled] [Patient Declined  Services] : - None: Patient declined  services [Friend] : friend [FreeTextEntry1] : S

## 2020-02-21 NOTE — HISTORY OF PRESENT ILLNESS
[Clean/Dry/Intact] : clean, dry and intact [None] : no vaginal bleeding [Healed] : healed [Normal] : the vagina was normal [Doing Well] : is doing well [No Sign of Infection] : is showing no signs of infection [Excellent Pain Control] : has excellent pain control [Fever] : no fever [Chills] : no chills [Nausea] : no nausea [Vomiting] : no vomiting [Diarrhea] : no diarrhea [Vaginal Bleeding] : no vaginal bleeding [Pelvic Pressure] : no pelvic pressure [Dysuria] : no dysuria [Vaginal Discharge] : no vaginal discharge [Erythema] : not erythematous [Constipation] : no constipation [Dehiscence] : not dehisced [Swelling] : not swollen [Discharge] : absent of discharge [de-identified] : 39 P0 LMP 11/25 s/p Lsc L ovarian cystectomy (11/29)  [de-identified] : Abd soft, nt

## 2020-04-28 NOTE — ASSESSMENT
[FreeTextEntry1] : Patient is a 38 YO F with chronic L sided low back pain. No signs of radiculopathy. Suspect patient's pain is 2/2 strain/fatigue of lumbar paraspinal musculature. Advised trial of conservative management,  NSAIDs and physical therapy. Patient will return in 8 weeks for reevaluation. May consider further imaging if patient fails to improve with initial conservative management. 
no fever/no nausea/no pain/no vomiting/no confusion/no disorientation/no abdominal distension/no abdominal pain/no chills

## 2020-05-20 NOTE — PATIENT PROFILE ADULT. - EXTENSIONS OF SELF_ADULT
Detail Level: Zone Other (Free Text): 1. Left central back\\n2. Left jawline\\n3.  Right sup lateral neck Note Text (......Xxx Chief Complaint.): This diagnosis correlates with the None

## 2020-07-12 ENCOUNTER — EMERGENCY (EMERGENCY)
Facility: HOSPITAL | Age: 40
LOS: 1 days | Discharge: HOME | End: 2020-07-12
Attending: EMERGENCY MEDICINE | Admitting: EMERGENCY MEDICINE
Payer: MEDICAID

## 2020-07-12 VITALS
TEMPERATURE: 100 F | HEART RATE: 102 BPM | DIASTOLIC BLOOD PRESSURE: 75 MMHG | RESPIRATION RATE: 18 BRPM | SYSTOLIC BLOOD PRESSURE: 118 MMHG | OXYGEN SATURATION: 100 %

## 2020-07-12 DIAGNOSIS — N13.5 CROSSING VESSEL AND STRICTURE OF URETER WITHOUT HYDRONEPHROSIS: Chronic | ICD-10-CM

## 2020-07-12 DIAGNOSIS — N13.30 UNSPECIFIED HYDRONEPHROSIS: Chronic | ICD-10-CM

## 2020-07-12 DIAGNOSIS — Z87.448 PERSONAL HISTORY OF OTHER DISEASES OF URINARY SYSTEM: Chronic | ICD-10-CM

## 2020-07-12 PROCEDURE — 99283 EMERGENCY DEPT VISIT LOW MDM: CPT

## 2020-07-12 RX ORDER — AMOXICILLIN 250 MG/5ML
500 SUSPENSION, RECONSTITUTED, ORAL (ML) ORAL ONCE
Refills: 0 | Status: COMPLETED | OUTPATIENT
Start: 2020-07-12 | End: 2020-07-12

## 2020-07-12 RX ORDER — NEOMYCIN/POLYMYXIN B/HYDROCORT
4 SUSPENSION, DROPS(FINAL DOSAGE FORM)(ML) OTIC (EAR)
Qty: 10 | Refills: 0
Start: 2020-07-12 | End: 2020-07-15

## 2020-07-12 RX ORDER — IBUPROFEN 200 MG
1 TABLET ORAL
Qty: 28 | Refills: 0
Start: 2020-07-12 | End: 2020-07-18

## 2020-07-12 RX ORDER — AMOXICILLIN 250 MG/5ML
1 SUSPENSION, RECONSTITUTED, ORAL (ML) ORAL
Qty: 21 | Refills: 0
Start: 2020-07-12 | End: 2020-07-18

## 2020-07-12 RX ORDER — IBUPROFEN 200 MG
600 TABLET ORAL ONCE
Refills: 0 | Status: COMPLETED | OUTPATIENT
Start: 2020-07-12 | End: 2020-07-12

## 2020-07-12 RX ADMIN — Medication 600 MILLIGRAM(S): at 15:27

## 2020-07-12 RX ADMIN — Medication 500 MILLIGRAM(S): at 15:26

## 2020-07-12 NOTE — ED PROVIDER NOTE - OBJECTIVE STATEMENT
39 yo F with PMH of hydronephrosis, ureteral stricture presents to ER c/o left ear pain x4 days a/w fever.    Syriac translation: [id number] 41 yo F with PMH of hydronephrosis, ureteral stricture presents to ER c/o left ear pain x4 days a/w fever.    Slovak translation: [id number]    Attendinyo female presents with left ear pain and low grade fever.  no vomiting.  has fullness in the ear.  pain with pulling on the tragus

## 2020-07-12 NOTE — ED PROVIDER NOTE - CARE PLAN
Principal Discharge DX:	Otitis externa  Secondary Diagnosis:	Other non-recurrent acute nonsuppurative otitis media of left ear

## 2020-07-12 NOTE — ED PROVIDER NOTE - RIGHT EAR
+mild tragal tenderness, mild-moderate external canal inflammation and redness, no drainage, TM clear.

## 2020-07-12 NOTE — ED PROVIDER NOTE - PMH
Bilateral ovarian cysts    Hydronephrosis, left    Seasonal allergies    Ureteral stricture  (left percutaneous nephrostomy and ureteral stent placed 2/21/18)

## 2020-07-12 NOTE — ED PROVIDER NOTE - CLINICAL SUMMARY MEDICAL DECISION MAKING FREE TEXT BOX
41 yo F with PMH of hydronephrosis, ureteral stricture presents to ER c/o left ear pain x4 days a/w fever. Well appearing female p/w acute left otalgia, likely otitis externa w/ otitis media. Plan: otic drops, oral antibiotic, ENT referral.

## 2020-07-12 NOTE — ED PROVIDER NOTE - NSFOLLOWUPINSTRUCTIONS_ED_ALL_ED_FT
Rest, drink plenty of fluids.  Advance activity as tolerated. Take Tylenol 650mg (Two 325 mg pills) every 4-6 hours as needed for pain. Take Motrin 600 mg every 8 hours as needed for moderate pain -- take with food. Use Ciprodex otic drops as directed in both ears for 7 days. Follow up with your primary care physician and ENT in 48-72 hours- bring copies of your results.  Return to the ER for worsening or persistent symptoms, and/or ANY NEW OR CONCERNING SYMPTOMS. If you have issues obtaining follow up, please call: [phone] to obtain a doctor or specialist who takes your insurance in your area.

## 2020-07-12 NOTE — ED PROVIDER NOTE - LEFT EAR
+tragal tenderness, moderate external canal swelling, no active drainage, unable to visualized TM due to edema, mastoid flat w/o tenderness

## 2020-07-12 NOTE — ED PROVIDER NOTE - PROGRESS NOTE DETAILS
Discussed with attending, patient can be discharged home to f/u with PCP and ENT. The patient was given verbal and written discharge instructions. Specifically, instructions when to return to the ED and when to seek follow-up from their pcp was discussed. Any specialty follow-up was discussed, including how to make an appointment.  Instructions were discussed in simple, plain language and was understood by the patient. The patient understands that should their symptoms worsen or any new symptoms arise, they should return to the ED immediately for further evaluation. All pt's questions were answered. Patient verbalizes understanding.   Pt has no medical insurance, will rx Cortisporin Otic drops.  Syriac translation [id number]

## 2020-07-12 NOTE — ED PROVIDER NOTE - PATIENT PORTAL LINK FT
You can access the FollowMyHealth Patient Portal offered by [facility] by registering at the following website: [email or web address]. By joining [facility]’s FollowMyHealth portal, you will also be able to view your health information using other applications (apps) compatible with our system.

## 2020-07-27 ENCOUNTER — APPOINTMENT (OUTPATIENT)
Dept: OTOLARYNGOLOGY | Facility: CLINIC | Age: 40
End: 2020-07-27
Payer: COMMERCIAL

## 2020-07-27 ENCOUNTER — OUTPATIENT (OUTPATIENT)
Dept: OUTPATIENT SERVICES | Facility: HOSPITAL | Age: 40
LOS: 1 days | Discharge: HOME | End: 2020-07-27

## 2020-07-27 VITALS
TEMPERATURE: 98.8 F | DIASTOLIC BLOOD PRESSURE: 75 MMHG | BODY MASS INDEX: 33.46 KG/M2 | HEART RATE: 74 BPM | SYSTOLIC BLOOD PRESSURE: 113 MMHG | WEIGHT: 196 LBS | HEIGHT: 64 IN

## 2020-07-27 DIAGNOSIS — H91.90 UNSPECIFIED HEARING LOSS, UNSPECIFIED EAR: ICD-10-CM

## 2020-07-27 DIAGNOSIS — N13.5 CROSSING VESSEL AND STRICTURE OF URETER WITHOUT HYDRONEPHROSIS: Chronic | ICD-10-CM

## 2020-07-27 DIAGNOSIS — N13.30 UNSPECIFIED HYDRONEPHROSIS: Chronic | ICD-10-CM

## 2020-07-27 DIAGNOSIS — Z87.448 PERSONAL HISTORY OF OTHER DISEASES OF URINARY SYSTEM: Chronic | ICD-10-CM

## 2020-07-27 DIAGNOSIS — H92.13 OTORRHEA, BILATERAL: ICD-10-CM

## 2020-07-27 DIAGNOSIS — H66.90 OTITIS MEDIA, UNSPECIFIED, UNSPECIFIED EAR: ICD-10-CM

## 2020-07-27 DIAGNOSIS — H90.5 UNSPECIFIED SENSORINEURAL HEARING LOSS: ICD-10-CM

## 2020-07-27 DIAGNOSIS — H92.03 OTALGIA, BILATERAL: ICD-10-CM

## 2020-07-27 PROCEDURE — 99203 OFFICE O/P NEW LOW 30 MIN: CPT | Mod: 25

## 2020-07-27 PROCEDURE — 92567 TYMPANOMETRY: CPT

## 2020-07-27 PROCEDURE — 92557 COMPREHENSIVE HEARING TEST: CPT

## 2020-07-27 NOTE — HISTORY OF PRESENT ILLNESS
[de-identified] : 41 yo seen in ED with ear infection 7/12/20 - both ears and referred to ENT , cant hear clearly out of right , no pain - has resolved; Left ear- no pain now . Had fever when in ED. was given amox and ear drops ( cortisporin) ; was using qtips before infection. Had previous problem in January . no vertigo or tinnitus

## 2020-07-27 NOTE — REASON FOR VISIT
[Initial Evaluation] : an initial evaluation for [Family Member] : family member [[employer] ] : provided by [employer]   [Source: ______] : History obtained from [unfilled] [FreeTextEntry1] : [id number] [FreeTextEntry2] : Derrek [TWNoteComboBox1] : Slovak

## 2020-08-12 DIAGNOSIS — H90.5 UNSPECIFIED SENSORINEURAL HEARING LOSS: ICD-10-CM

## 2020-08-12 DIAGNOSIS — H92.03 OTALGIA, BILATERAL: ICD-10-CM

## 2020-09-13 ENCOUNTER — EMERGENCY (EMERGENCY)
Facility: HOSPITAL | Age: 40
LOS: 1 days | Discharge: HOME | End: 2020-09-13
Attending: STUDENT IN AN ORGANIZED HEALTH CARE EDUCATION/TRAINING PROGRAM | Admitting: STUDENT IN AN ORGANIZED HEALTH CARE EDUCATION/TRAINING PROGRAM
Payer: MEDICAID

## 2020-09-13 VITALS
OXYGEN SATURATION: 98 % | RESPIRATION RATE: 18 BRPM | HEART RATE: 85 BPM | HEIGHT: 64 IN | TEMPERATURE: 98 F | DIASTOLIC BLOOD PRESSURE: 62 MMHG | SYSTOLIC BLOOD PRESSURE: 132 MMHG

## 2020-09-13 DIAGNOSIS — N13.30 UNSPECIFIED HYDRONEPHROSIS: Chronic | ICD-10-CM

## 2020-09-13 DIAGNOSIS — Z87.448 PERSONAL HISTORY OF OTHER DISEASES OF URINARY SYSTEM: Chronic | ICD-10-CM

## 2020-09-13 DIAGNOSIS — N13.5 CROSSING VESSEL AND STRICTURE OF URETER WITHOUT HYDRONEPHROSIS: Chronic | ICD-10-CM

## 2020-09-13 LAB
ALBUMIN SERPL ELPH-MCNC: 4.3 G/DL — SIGNIFICANT CHANGE UP (ref 3.3–5)
ALP SERPL-CCNC: 68 U/L — SIGNIFICANT CHANGE UP (ref 40–120)
ALT FLD-CCNC: 26 U/L — SIGNIFICANT CHANGE UP (ref 4–33)
ANION GAP SERPL CALC-SCNC: 11 MMO/L — SIGNIFICANT CHANGE UP (ref 7–14)
APPEARANCE UR: SIGNIFICANT CHANGE UP
AST SERPL-CCNC: 20 U/L — SIGNIFICANT CHANGE UP (ref 4–32)
BACTERIA # UR AUTO: NEGATIVE — SIGNIFICANT CHANGE UP
BASOPHILS # BLD AUTO: 0.04 K/UL — SIGNIFICANT CHANGE UP (ref 0–0.2)
BASOPHILS NFR BLD AUTO: 0.4 % — SIGNIFICANT CHANGE UP (ref 0–2)
BILIRUB SERPL-MCNC: < 0.2 MG/DL — LOW (ref 0.2–1.2)
BILIRUB UR-MCNC: NEGATIVE — SIGNIFICANT CHANGE UP
BLOOD UR QL VISUAL: HIGH
BUN SERPL-MCNC: 8 MG/DL — SIGNIFICANT CHANGE UP (ref 7–23)
CALCIUM SERPL-MCNC: 8.7 MG/DL — SIGNIFICANT CHANGE UP (ref 8.4–10.5)
CHLORIDE SERPL-SCNC: 107 MMOL/L — SIGNIFICANT CHANGE UP (ref 98–107)
CO2 SERPL-SCNC: 23 MMOL/L — SIGNIFICANT CHANGE UP (ref 22–31)
COLOR SPEC: SIGNIFICANT CHANGE UP
CREAT SERPL-MCNC: 0.69 MG/DL — SIGNIFICANT CHANGE UP (ref 0.5–1.3)
EOSINOPHIL # BLD AUTO: 0.06 K/UL — SIGNIFICANT CHANGE UP (ref 0–0.5)
EOSINOPHIL NFR BLD AUTO: 0.6 % — SIGNIFICANT CHANGE UP (ref 0–6)
GLUCOSE SERPL-MCNC: 110 MG/DL — HIGH (ref 70–99)
GLUCOSE UR-MCNC: NEGATIVE — SIGNIFICANT CHANGE UP
HCT VFR BLD CALC: 30.4 % — LOW (ref 34.5–45)
HGB BLD-MCNC: 8.9 G/DL — LOW (ref 11.5–15.5)
HIV COMBO RESULT: SIGNIFICANT CHANGE UP
HIV1+2 AB SPEC QL: SIGNIFICANT CHANGE UP
HYALINE CASTS # UR AUTO: SIGNIFICANT CHANGE UP
IMM GRANULOCYTES NFR BLD AUTO: 0.2 % — SIGNIFICANT CHANGE UP (ref 0–1.5)
KETONES UR-MCNC: NEGATIVE — SIGNIFICANT CHANGE UP
LEUKOCYTE ESTERASE UR-ACNC: SIGNIFICANT CHANGE UP
LYMPHOCYTES # BLD AUTO: 1.13 K/UL — SIGNIFICANT CHANGE UP (ref 1–3.3)
LYMPHOCYTES # BLD AUTO: 11 % — LOW (ref 13–44)
MCHC RBC-ENTMCNC: 22.5 PG — LOW (ref 27–34)
MCHC RBC-ENTMCNC: 29.3 % — LOW (ref 32–36)
MCV RBC AUTO: 76.8 FL — LOW (ref 80–100)
MONOCYTES # BLD AUTO: 0.56 K/UL — SIGNIFICANT CHANGE UP (ref 0–0.9)
MONOCYTES NFR BLD AUTO: 5.4 % — SIGNIFICANT CHANGE UP (ref 2–14)
NEUTROPHILS # BLD AUTO: 8.5 K/UL — HIGH (ref 1.8–7.4)
NEUTROPHILS NFR BLD AUTO: 82.4 % — HIGH (ref 43–77)
NITRITE UR-MCNC: NEGATIVE — SIGNIFICANT CHANGE UP
NRBC # FLD: 0 K/UL — SIGNIFICANT CHANGE UP (ref 0–0)
PH UR: 6 — SIGNIFICANT CHANGE UP (ref 5–8)
PLATELET # BLD AUTO: 301 K/UL — SIGNIFICANT CHANGE UP (ref 150–400)
PMV BLD: 9.5 FL — SIGNIFICANT CHANGE UP (ref 7–13)
POTASSIUM SERPL-MCNC: 4.2 MMOL/L — SIGNIFICANT CHANGE UP (ref 3.5–5.3)
POTASSIUM SERPL-SCNC: 4.2 MMOL/L — SIGNIFICANT CHANGE UP (ref 3.5–5.3)
PROT SERPL-MCNC: 7 G/DL — SIGNIFICANT CHANGE UP (ref 6–8.3)
PROT UR-MCNC: 30 — SIGNIFICANT CHANGE UP
RBC # BLD: 3.96 M/UL — SIGNIFICANT CHANGE UP (ref 3.8–5.2)
RBC # FLD: 16.7 % — HIGH (ref 10.3–14.5)
RBC CASTS # UR COMP ASSIST: >50 — HIGH (ref 0–?)
SODIUM SERPL-SCNC: 141 MMOL/L — SIGNIFICANT CHANGE UP (ref 135–145)
SP GR SPEC: 1.01 — SIGNIFICANT CHANGE UP (ref 1–1.04)
SQUAMOUS # UR AUTO: SIGNIFICANT CHANGE UP
UROBILINOGEN FLD QL: NORMAL — SIGNIFICANT CHANGE UP
WBC # BLD: 10.31 K/UL — SIGNIFICANT CHANGE UP (ref 3.8–10.5)
WBC # FLD AUTO: 10.31 K/UL — SIGNIFICANT CHANGE UP (ref 3.8–10.5)
WBC UR QL: >50 — HIGH (ref 0–?)

## 2020-09-13 PROCEDURE — 76830 TRANSVAGINAL US NON-OB: CPT | Mod: 26

## 2020-09-13 PROCEDURE — 99219: CPT

## 2020-09-13 PROCEDURE — 74176 CT ABD & PELVIS W/O CONTRAST: CPT | Mod: 26

## 2020-09-13 PROCEDURE — 99243 OFF/OP CNSLTJ NEW/EST LOW 30: CPT

## 2020-09-13 RX ORDER — ACETAMINOPHEN 500 MG
650 TABLET ORAL ONCE
Refills: 0 | Status: COMPLETED | OUTPATIENT
Start: 2020-09-13 | End: 2020-09-13

## 2020-09-13 RX ORDER — ACETAMINOPHEN 500 MG
650 TABLET ORAL EVERY 6 HOURS
Refills: 0 | Status: DISCONTINUED | OUTPATIENT
Start: 2020-09-13 | End: 2020-09-17

## 2020-09-13 RX ORDER — CEFTRIAXONE 500 MG/1
1000 INJECTION, POWDER, FOR SOLUTION INTRAMUSCULAR; INTRAVENOUS EVERY 12 HOURS
Refills: 0 | Status: DISCONTINUED | OUTPATIENT
Start: 2020-09-13 | End: 2020-09-17

## 2020-09-13 RX ORDER — CEFTRIAXONE 500 MG/1
1000 INJECTION, POWDER, FOR SOLUTION INTRAMUSCULAR; INTRAVENOUS ONCE
Refills: 0 | Status: COMPLETED | OUTPATIENT
Start: 2020-09-13 | End: 2020-09-13

## 2020-09-13 RX ADMIN — CEFTRIAXONE 100 MILLIGRAM(S): 500 INJECTION, POWDER, FOR SOLUTION INTRAMUSCULAR; INTRAVENOUS at 16:48

## 2020-09-13 RX ADMIN — Medication 650 MILLIGRAM(S): at 18:52

## 2020-09-13 NOTE — ED CDU PROVIDER INITIAL DAY NOTE - MEDICAL DECISION MAKING DETAILS
L flank pain, +UA, chronic hydronephrosis concern for pyelonephritis. Plan TVUS to assess for ovarian neoplasm on US as potential cause of pain, abx to treat pyelonephritis, urology c/s, pain control, reassess

## 2020-09-13 NOTE — CONSULT NOTE ADULT - ASSESSMENT
40 year old female with history of UPJ obstruction s/p pyeloplasty and distal ureteral stricture 2/2 ?left adnexal cyst s/p balloon dilation and cyst excision, here with UTI.    - No evidence of pyelonephritis at this time  - Rec empiric treatment of UTI  - Urine culture  - Patient does not require emergent decompression at this time  - Patient to be observed overnight in CDU per ED  - Will reassess in the morning, tentative plan for discharge home with oral abx at that time if stable    D/w Dr Madera

## 2020-09-13 NOTE — ED CDU PROVIDER INITIAL DAY NOTE - ATTENDING CONTRIBUTION TO CARE
Attending Statement: I have reviewed and agree with all pertinent clinical information, including history and physical exam and agree with treatment plan of the PA, except as noted.  DW w pt in [place]an   41yo F  PMSH significant for L ureteral stricture 2/2 benign fibroepithelial polyp s/p robotic pyeloplasty, also s/p nephrostomy and ureteral stent, hx of left complex adnexal cyst 4.4cm presented with left flank pain radiate to the left lower abdomen x 3 days. Constant with episodes of wax/wane intensity. No fever or chills. No nausea/vomit. no vag bleeding or discharge LMP 8-31-20 +dysuria, foul smell urine and "a little bit of blood" no clots. not on abx. Follows at the urology clinic.   Vital signs noted. Walking around in RW, nontoxic appearing female. mmm. non icteric. no juandice. normal S1-S2 No resp distress. able to speak in full and clear sentences. no wheeze, rales or stridor. soft nondistended tender at the left flank/left lower quad. no rebound or guarding. pelvic deferred Ao3  plan IV abx, IVF, urology to see pt, TVUS, pain med, repeat am labs,

## 2020-09-13 NOTE — CONSULT NOTE ADULT - SUBJECTIVE AND OBJECTIVE BOX
HPI    40 year old female with history of a left UPJ obstruction s/p pyeloplasty (2017), complicated by residual distal ureteral stricture s/p ureteroscopy and biopsy w/ failed stent placement and subsequent NT placement (2018), s/p left ureteroscopy with fulguration/biopsy and ureteral dilation (3/13/2018) with successful stent removal. Concern for persistent obstruction 2/2 left adnexal cyst s/p laparoscopic ovarian cyst removal (2019).    Presents to ED with lower abdominal/back pain and dysuria for 3 days. Afebrile, WBC 10.8, SCr 0.69. UA nitrite negative but with WBC and RBC >50. CT abdomen pelvis showing stable left sided moderate to severe L hydronephrosis without other evidence of acute obstruction.    Patient feeling well now, denies fevers chills, nausea/vomiting, flank pain.    PAST MEDICAL & SURGICAL HISTORY:  Bilateral ovarian cysts    Ureteral stricture  (left percutaneous nephrostomy and ureteral stent placed 18)    Seasonal allergies    Hydronephrosis, left    Acquired ureteral stricture    History of nephrostomy  (Left percutaneous nephrostomy and ureteral stent placed 18)    Acquired hydronephrosis  left pyeloplasty 2017        MEDICATIONS  (STANDING):  cefTRIAXone   IVPB 1000 milliGRAM(s) IV Intermittent every 12 hours    MEDICATIONS  (PRN):  acetaminophen   Tablet .. 650 milliGRAM(s) Oral every 6 hours PRN Mild Pain (1 - 3)      FAMILY HISTORY:  Family history of cancer (Mother)  cervical CA, mother        Allergies    No Known Allergies    Intolerances        SOCIAL HISTORY:    REVIEW OF SYSTEMS: Otherwise negative as stated in HPI    Physical Exam  Vital signs  T(C): 36.4 (20 @ 20:53), Max: 36.9 (20 @ 18:23)  HR: 52 (20 @ 20:53)  BP: 104/59 (20 @ 20:53)  SpO2: 100% (20 @ 20:53)  Wt(kg): --    Output      Gen:  NAD    Pulm:  No respiratory distress  	  CV:  RRR    GI:  S/ND/NT    :  no CVAT    MSK:      LABS:       @ 15:00    WBC 10.31 / Hct 30.4  / SCr 0.69         141  |  107  |  8   ----------------------------<  110<H>  4.2   |  23  |  0.69    Ca    8.7      13 Sep 2020 15:00    TPro  7.0  /  Alb  4.3  /  TBili  < 0.2<L>  /  DBili  x   /  AST  20  /  ALT  26  /  AlkPhos  68  -      Urinalysis Basic - ( 13 Sep 2020 15:02 )    Color: LIGHT YELLOW / Appearance: Lt TURBID / S.011 / pH: 6.0  Gluc: NEGATIVE / Ketone: NEGATIVE  / Bili: NEGATIVE / Urobili: NORMAL   Blood: LARGE / Protein: 30 / Nitrite: NEGATIVE   Leuk Esterase: LARGE / RBC: >50 / WBC >50   Sq Epi: FEW / Non Sq Epi: x / Bacteria: NEGATIVE        Urine Cx: pending      RADIOLOGY:  < from: CT Abdomen and Pelvis No Cont (20 @ 16:34) >    EXAM:  CT ABDOMEN AND PELVIS        PROCEDURE DATE:  Sep 13 2020         INTERPRETATION:  CLINICAL INFORMATION: Left flank pain and dysuria.    COMPARISON: CT abdomen pelvis 2019.    PROCEDURE:  CT of the Abdomen and Pelvis was performed without intravenous contrast in the prone position.  Intravenous contrast: None.  Oral contrast: None.  Sagittal and coronal reformats were performed.    FINDINGS:  LOWER CHEST: Within normal limits.    LIVER: Within normal limits.  BILE DUCTS: Normal caliber.  GALLBLADDER: Within normal limits.  SPLEEN: Within normal limits.  PANCREAS: Within normal limits.  ADRENALS: Within normal limits.  KIDNEYS/URETERS: Unchanged moderate to severe left hydroureteronephrosis to the level of the lower ureter withoutobstructing stone.    BLADDER: Within normal limits.  REPRODUCTIVE ORGANS: Left adnexal cystic lesion similar to 2019 suggesting benign ovarian neoplasm    BOWEL: No bowel obstruction. Appendix is normal.  PERITONEUM: No ascites.  VESSELS: Within normal limits.  RETROPERITONEUM/LYMPH NODES: No lymphadenopathy.  ABDOMINAL WALL: Within normal limits.  BONES: Within normal limits.    IMPRESSION:  Unchanged moderate to severe left hydroureteronephrosis to the level of the lower ureter without obstructing stone.    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  AMANDA LERMA M.D., ATTENDING RADIOLOGIST  This document has been electronically signed. Sep 13 2020  5:15PM    < end of copied text >

## 2020-09-13 NOTE — ED ADULT NURSE NOTE - OBJECTIVE STATEMENT
Received pt to intake bed 6, A+Ox3, ambulatory. C/O left sided flank pain. pt endorses Hx of kidney stones. Respirations even and unlabored, normal work of breathing, no accessory muscle use, speaking in full clear uninterrupted sentences. ABD is soft, tender, non distended with normal active bowel sounds. Pt denies any chest pain, SOB, headache, dizziness, N/V/D, fever, chills. Labs sent,  will continue to monitor.

## 2020-09-13 NOTE — ED PROVIDER NOTE - CLINICAL SUMMARY MEDICAL DECISION MAKING FREE TEXT BOX
Pt is a 41 y/o F nonsmoker PMHx left hydronephrosis, ureteral stricture, h/o ureteral stent and nephrostomy p/w abdominal/flank pain x 3 days -- possible pyelonephritis, possible UTI, r/o kidney stone -- labs, ua, ucx, ct abd and pelvis

## 2020-09-13 NOTE — ED CDU PROVIDER INITIAL DAY NOTE - DETAILS
TVUS L flank pain, +UA, chronic hydronephrosis concern for pyelonephritis. Plan TVUS to assess for ovarian neoplasm on US as potential cause of pain, abx to treat pyelonephritis, urology c/s, pain control, reassess

## 2020-09-13 NOTE — ED PROVIDER NOTE - OBJECTIVE STATEMENT
Pt is a 41 y/o F nonsmoker PMHx left hydronephrosis, ureteral stricture, h/o ureteral stent and nephrostomy p/w abdominal/flank pain x 3 days.  Language [employer] #[id number] used for Welsh translation.  Pt notes developing intermittent LLQ radiating to left flank associated with burning dysuria and occasional hematuria.  Worsens with urination.  No known alleviating factors.  Pt denies any fevers, chills, nausea, vomiting, diarrhea, vaginal discharge, vaginal bleeding, malodorous urine.

## 2020-09-13 NOTE — ED CDU PROVIDER INITIAL DAY NOTE - PROGRESS NOTE DETAILS
pt pending covid, TVUS to be done, urology consult. Continue IV abx and IVF. Endorse to night team. Received signout on pt, pt is currently well appearing, clinical pyelonephritis, awaiting U/S read from pelvic sono.

## 2020-09-13 NOTE — ED CDU PROVIDER INITIAL DAY NOTE - OBJECTIVE STATEMENT
39 y/o F chronic hydroureteronephrosis sp ureteral stent and nephrostomy in the pain, ovarian cysts sp operative intervention, here c/o dysuria, hematuria, and L flank pain x 3 days. Gradual in onset. Feels like her prior infections in the past. Denies fevers, chills, N/V, vaginal bleeding, vaginal discharge. LMP Aug 31. Sexually active w/ 1 partner. 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 Plan was to consult urology, abx overnight, and TVUS to better assess the ovarian neoplasm. Pt currently c/o 6/10 pain, would like to take Tylenol. 41 y/o F chronic hydroureteronephrosis sp ureteral stent and nephrostomy in the pain, ovarian cysts sp operative intervention, here c/o dysuria, hematuria, and L flank pain x 3 days. Gradual in onset. Feels like her prior infections in the past. Denies fevers, chills, N/V, vaginal bleeding, vaginal discharge. LMP Aug 31. Sexually active w/ 1 partner. 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 Plan was to consult urology, abx overnight, and TVUS to better assess the ovarian neoplasm. Pt currently c/o 6/10 pain, would like to take Tylenol.     ID [id number]

## 2020-09-13 NOTE — ED PROVIDER NOTE - NONTENDER LOCATION
left lower quadrant/umbilical/suprapubic/right costovertebral angle/left upper quadrant/right upper quadrant/periumbilical/left costovertebral angle/right lower quadrant

## 2020-09-13 NOTE — ED PROVIDER NOTE - ATTENDING CONTRIBUTION TO CARE
39yo F with ho left hydronephrosis, ureteral stricture, stent, follows with urology pw dysuria, urinary frequency, hematuria and left cva pain and llq abd pain.  no fevers, no chills, no vomiting    no tenderness on exam, will eval for uti/pyelo, CT ro stone, likely admit vs cdu for abx

## 2020-09-14 VITALS
SYSTOLIC BLOOD PRESSURE: 104 MMHG | OXYGEN SATURATION: 99 % | RESPIRATION RATE: 16 BRPM | HEART RATE: 75 BPM | DIASTOLIC BLOOD PRESSURE: 51 MMHG | TEMPERATURE: 98 F

## 2020-09-14 LAB — SARS-COV-2 RNA SPEC QL NAA+PROBE: SIGNIFICANT CHANGE UP

## 2020-09-14 PROCEDURE — 99217: CPT

## 2020-09-14 RX ORDER — CEFPODOXIME PROXETIL 100 MG
1 TABLET ORAL
Qty: 20 | Refills: 0
Start: 2020-09-14 | End: 2020-09-23

## 2020-09-14 RX ADMIN — CEFTRIAXONE 100 MILLIGRAM(S): 500 INJECTION, POWDER, FOR SOLUTION INTRAMUSCULAR; INTRAVENOUS at 05:42

## 2020-09-14 NOTE — ED CDU PROVIDER DISPOSITION NOTE - PATIENT PORTAL LINK FT
You can access the FollowMyHealth Patient Portal offered by [facility] by registering at the following website: [email or web address]. By joining [facility]’s FollowMyHealth portal, you will also be able to view your health information using other applications (apps) compatible with our system.

## 2020-09-14 NOTE — ED CDU PROVIDER SUBSEQUENT DAY NOTE - ATTENDING CONTRIBUTION TO CARE
41 y/o F chronic hydroureteronephrosis sp ureteral stent and nephrostomy in the pain, ovarian cysts sp operative intervention, here c/o dysuria, hematuria, and L flank pain x 3 days. CT with unchanged hydronephrosis. PT placed in CDU for observation over night. States her pain has improved, was seen by urology. Currently states pain in 2-3 /10 tolerated PO. pt told she needs 10 days of antibiotics and will need to f/u with her urology and clinic. cefpodoxime 200mg BID sent to pharmacy.  denies fever, chills, chest pain, , diarrhea, dysuria, syncope, bleeding, new rash,weakness,    ROS  otherwise negative as per HPI  Gen: Awake, Alert, WD, WN, NAD  Head:  NC/AT  Eyes:  PERRL, EOMI, Conjunctiva pink,   ENT:  moist mucus membranes  Neck: supple, nontender, no meningismus, no JVD, trachea midline  Cardiac/CV:  S1 S2, RRR, no M/G/R  Respiratory/Pulm:  CTAB, good air movement, normal resp effort, no wheezes/stridor/retractions/rales/rhonchi  Gastrointestinal/Abdomen:  Soft, minimal suprapubic tenderness, nondistended, +BS, no rebound/guarding  Back:  no CVAT, no MLT  Ext:  warm, well perfused, moving all extremities spontaneously, no peripheral edema, distal pulses intact  Skin: intact, no rash  Neuro:  AAOx3, sensation intact, motor 5/5 x 4 extremities, normal gait, speech clear  CDU plan of care:  41 y/o F chronic hydroureteronephrosis sp ureteral stent and nephrostomy in the pain, ovarian cysts sp operative intervention, here c/o dysuria, hematuria, and L flank pain x 3 days. CT with unchanged hydronephrosis seen by urology tolerating PO, will send on cefpodoxime and f/u w/ urology and clinic, culture results pending .

## 2020-09-14 NOTE — ED CDU PROVIDER SUBSEQUENT DAY NOTE - PROGRESS NOTE DETAILS
Pt seen by urology, she feels improved this morning. Will d/c home with PMD follow up and rx for cefpodoxime

## 2020-09-14 NOTE — ED CDU PROVIDER SUBSEQUENT DAY NOTE - MEDICAL DECISION MAKING DETAILS
39 y/o F chronic hydroureteronephrosis sp ureteral stent and nephrostomy in the pain, ovarian cysts sp operative intervention, here c/o dysuria, hematuria, and L flank pain x 3 days. CT with unchanged hydronephrosis and transvaginal US shows likely complex cyst reduced in size from prior. Likely acute pyelonephritis complicating chronic hydronephrosis

## 2020-09-14 NOTE — ED CDU PROVIDER DISPOSITION NOTE - CLINICAL COURSE
9 y/o F chronic hydroureteronephrosis sp ureteral stent and nephrostomy in the pain, ovarian cysts sp operative intervention, here c/o dysuria, hematuria, and L flank pain x 3 days. CT with unchanged hydronephrosis. PT placed in CDU for observation over night. States her pain has improved, was seen by urology. Currently states pain in 2-3 /10 tolerated PO. feels better

## 2020-09-14 NOTE — ED CDU PROVIDER DISPOSITION NOTE - NSFOLLOWUPINSTRUCTIONS_ED_ALL_ED_FT
Follow up with your primary care doctor within 1 week  Take Cefpodoxime 200mg (1 tablet) twice daily for 10 days  Take Tylenol 650mg every 6 hours as needed for pain  Return to the ER with any worsening or concerning symptoms, increased pain, nausea, vomiting, fever or any other concerns. Follow up with your primary care doctor and urologist within 1 week  Take Cefpodoxime 200mg (1 tablet) twice daily for 10 days  Take Tylenol 650mg every 6 hours as needed for pain  Return to the ER with any worsening or concerning symptoms, increased pain, nausea, vomiting, fever or any other concerns.    Marie un seguimiento con abbott médico de atención primaria y urólogo dentro de 1 semana  [place] Cefpodoxima 200 mg (1 tableta) dos veces al día thi 10 días.  [place] Tylenol 650 mg cada 6 horas según sea necesario para el dolor  Regrese a la tanner de emergencias con cualquier síntoma que empeore o que le preocupe, aumento del dolor, náuseas, vómitos, fiebre o cualquier otra inquietud.

## 2020-09-14 NOTE — ED CDU PROVIDER SUBSEQUENT DAY NOTE - HISTORY
39 y/o F chronic hydroureteronephrosis sp ureteral stent and nephrostomy in the pain, ovarian cysts sp operative intervention, here c/o dysuria, hematuria, and L flank pain x 3 days. Gradual in onset. Feels like her prior infections in the past. Denies fevers, chills, N/V, vaginal bleeding, vaginal discharge. LMP Aug 31. Sexually active w/ 1 partner. 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 Plan was to consult urology, abx overnight, and TVUS to better assess the ovarian neoplasm. Pt currently c/o 6/10 pain, would like to take Tylenol.    CDU NOEMI Miller: Agree with above, 39 y/o F chronic hydroureteronephrosis sp ureteral stent and nephrostomy in the pain, ovarian cysts sp operative intervention, here c/o dysuria, hematuria, and L flank pain x 3 days. CT with unchanged hydronephrosis and transvaginal US shows likely complex cyst reduced in size from prior. Discussed case with overnight GYN resident, no acute intervention needed at this time. Pt to CDU for treatment of Pyelonephritis and AM reassessment. While in CDU pt has been stable and afebrile. Plan is reassessment in AM, continued IV ABX, Urology service is following. Spoke to pt with  [id number].

## 2020-09-14 NOTE — PROGRESS NOTE ADULT - SUBJECTIVE AND OBJECTIVE BOX
Subjective    Seen and examined.  No fevers, no flank pain.    Objective    Vital signs  T(F): , Max: 98.4 (09-13-20 @ 18:23)  HR: 75 (09-14-20 @ 05:24)  BP: 104/51 (09-14-20 @ 05:24)  SpO2: 99% (09-14-20 @ 05:24)  Wt(kg): --    Output       Physical Exam  Gen NAD  Abd soft NT ND    no CVAT    Labs      09-13 @ 15:00    WBC 10.31 / Hct 30.4  / SCr 0.69       Urine Cx: pending    Imaging  < from: CT Abdomen and Pelvis No Cont (09.13.20 @ 16:34) >    EXAM:  CT ABDOMEN AND PELVIS        PROCEDURE DATE:  Sep 13 2020         INTERPRETATION:  CLINICAL INFORMATION: Left flank pain and dysuria.    COMPARISON: CT abdomen pelvis 4/9/2019.    PROCEDURE:  CT of the Abdomen and Pelvis was performed without intravenous contrast in the prone position.  Intravenous contrast: None.  Oral contrast: None.  Sagittal and coronal reformats were performed.    FINDINGS:  LOWER CHEST: Within normal limits.    LIVER: Within normal limits.  BILE DUCTS: Normal caliber.  GALLBLADDER: Within normal limits.  SPLEEN: Within normal limits.  PANCREAS: Within normal limits.  ADRENALS: Within normal limits.  KIDNEYS/URETERS: Unchanged moderate to severe left hydroureteronephrosis to the level of the lower ureter withoutobstructing stone.    BLADDER: Within normal limits.  REPRODUCTIVE ORGANS: Left adnexal cystic lesion similar to 04/09/2019 suggesting benign ovarian neoplasm    BOWEL: No bowel obstruction. Appendix is normal.  PERITONEUM: No ascites.  VESSELS: Within normal limits.  RETROPERITONEUM/LYMPH NODES: No lymphadenopathy.  ABDOMINAL WALL: Within normal limits.  BONES: Within normal limits.    IMPRESSION:  Unchanged moderate to severe left hydroureteronephrosis to the level of the lower ureter without obstructing stone.    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  AMANDA LERMA M.D., ATTENDING RADIOLOGIST  This document has been electronically signed. Sep 13 2020  5:15PM    < end of copied text >

## 2020-09-14 NOTE — PROGRESS NOTE ADULT - ASSESSMENT
40 year old female with history of UPJ obstruction s/p pyeloplasty and distal ureteral stricture 2/2 ?left adnexal cyst s/p balloon dilation and cyst excision, here with UTI.    - No evidence of pyelonephritis at this time  - Rec empiric treatment of UTI  - Urine culture  - Patient does not require emergent decompression at this time  - Rec discharge with 5 day course of abx    D/w Dr Madera

## 2020-09-18 ENCOUNTER — OUTPATIENT (OUTPATIENT)
Dept: OUTPATIENT SERVICES | Facility: HOSPITAL | Age: 40
LOS: 1 days | End: 2020-09-18
Payer: COMMERCIAL

## 2020-09-18 ENCOUNTER — APPOINTMENT (OUTPATIENT)
Dept: UROLOGY | Facility: CLINIC | Age: 40
End: 2020-09-18

## 2020-09-18 DIAGNOSIS — Z87.448 PERSONAL HISTORY OF OTHER DISEASES OF URINARY SYSTEM: Chronic | ICD-10-CM

## 2020-09-18 DIAGNOSIS — N13.30 UNSPECIFIED HYDRONEPHROSIS: Chronic | ICD-10-CM

## 2020-09-18 DIAGNOSIS — M54.5 LOW BACK PAIN: ICD-10-CM

## 2020-09-18 DIAGNOSIS — G89.29 LOW BACK PAIN: ICD-10-CM

## 2020-09-18 DIAGNOSIS — R35.0 FREQUENCY OF MICTURITION: ICD-10-CM

## 2020-09-18 DIAGNOSIS — N13.5 CROSSING VESSEL AND STRICTURE OF URETER WITHOUT HYDRONEPHROSIS: Chronic | ICD-10-CM

## 2020-09-18 PROCEDURE — G0463: CPT

## 2020-09-19 LAB — BACTERIA UR CULT: NORMAL

## 2020-09-21 NOTE — ASSESSMENT
[FreeTextEntry1] : 40 year old female with history of UPJ obstruction s/p pyeloplasty and distal ureteral stricture 2/2 ?left adnexal cyst s/p balloon dilation and cyst excision, treated for Ecoli UTI, pain improving and has not had fevers.\par --urine culture for test of cure \par --follow up to Urology clinic in 3 months, no signs of acute ureteral obstruction\par --counseled on recurrent UTI signs and to call/RTC \par --Renal US in 6 months\par --follow up with GYN for ovarian cyst/mass

## 2020-09-21 NOTE — PHYSICAL EXAM
[General Appearance - Well Developed] : well developed [General Appearance - Well Nourished] : well nourished [Normal Appearance] : normal appearance [Well Groomed] : well groomed [General Appearance - In No Acute Distress] : no acute distress [Abdomen Soft] : soft [Abdomen Mass (___ Cm)] : no abdominal mass palpated [Abdomen Hernia] : no hernia was discovered [Costovertebral Angle Tenderness] : no ~M costovertebral angle tenderness [Skin Color & Pigmentation] : normal skin color and pigmentation [Skin Lesions] : no skin lesions [Heart Rate And Rhythm] : Heart rate and rhythm were normal [] : no respiratory distress [Exaggerated Use Of Accessory Muscles For Inspiration] : no accessory muscle use [Oriented To Time, Place, And Person] : oriented to person, place, and time [Not Anxious] : not anxious [Normal Station and Gait] : the gait and station were normal for the patient's age [FreeTextEntry1] : No CVA tenderness on Righ or left

## 2020-09-21 NOTE — END OF VISIT
[FreeTextEntry3] : chronic hydro due to hx of left UPJ that led to pyeloplasty \par mayuri continue to follow any change in hydro followed by renal scan if changed\par no sxs -

## 2020-09-24 DIAGNOSIS — N13.5 CROSSING VESSEL AND STRICTURE OF URETER WITHOUT HYDRONEPHROSIS: ICD-10-CM

## 2020-09-24 DIAGNOSIS — M54.5 LOW BACK PAIN: ICD-10-CM

## 2020-09-30 ENCOUNTER — APPOINTMENT (OUTPATIENT)
Dept: OBGYN | Facility: HOSPITAL | Age: 40
End: 2020-09-30
Payer: COMMERCIAL

## 2020-09-30 ENCOUNTER — OUTPATIENT (OUTPATIENT)
Dept: OUTPATIENT SERVICES | Facility: HOSPITAL | Age: 40
LOS: 1 days | End: 2020-09-30

## 2020-09-30 VITALS
DIASTOLIC BLOOD PRESSURE: 53 MMHG | SYSTOLIC BLOOD PRESSURE: 108 MMHG | WEIGHT: 181 LBS | TEMPERATURE: 98.3 F | HEIGHT: 64 IN | HEART RATE: 60 BPM | BODY MASS INDEX: 30.9 KG/M2

## 2020-09-30 DIAGNOSIS — N13.5 CROSSING VESSEL AND STRICTURE OF URETER WITHOUT HYDRONEPHROSIS: Chronic | ICD-10-CM

## 2020-09-30 DIAGNOSIS — Z87.448 PERSONAL HISTORY OF OTHER DISEASES OF URINARY SYSTEM: Chronic | ICD-10-CM

## 2020-09-30 DIAGNOSIS — N13.30 UNSPECIFIED HYDRONEPHROSIS: Chronic | ICD-10-CM

## 2020-09-30 PROCEDURE — 99213 OFFICE O/P EST LOW 20 MIN: CPT | Mod: GE

## 2020-10-03 NOTE — PHYSICAL EXAM
[Soft] : soft [Non-tender] : non-tender [Non-distended] : non-distended [No Lesions] : no lesions [No Mass] : no mass [Normal] : normal external genitalia [FreeTextEntry7] : no CVA Tenderness [FreeTextEntry8] : no adnexal tenderness , no masses appreciated

## 2020-10-05 DIAGNOSIS — N83.201 UNSPECIFIED OVARIAN CYST, RIGHT SIDE: ICD-10-CM

## 2020-11-14 ENCOUNTER — APPOINTMENT (OUTPATIENT)
Dept: ULTRASOUND IMAGING | Facility: CLINIC | Age: 40
End: 2020-11-14

## 2020-11-14 ENCOUNTER — OUTPATIENT (OUTPATIENT)
Dept: OUTPATIENT SERVICES | Facility: HOSPITAL | Age: 40
LOS: 1 days | End: 2020-11-14

## 2020-11-14 DIAGNOSIS — N13.30 UNSPECIFIED HYDRONEPHROSIS: Chronic | ICD-10-CM

## 2020-11-14 DIAGNOSIS — N83.201 UNSPECIFIED OVARIAN CYST, RIGHT SIDE: ICD-10-CM

## 2020-11-14 DIAGNOSIS — N13.5 CROSSING VESSEL AND STRICTURE OF URETER WITHOUT HYDRONEPHROSIS: Chronic | ICD-10-CM

## 2020-11-14 DIAGNOSIS — Z87.448 PERSONAL HISTORY OF OTHER DISEASES OF URINARY SYSTEM: Chronic | ICD-10-CM

## 2020-11-16 ENCOUNTER — OUTPATIENT (OUTPATIENT)
Dept: OUTPATIENT SERVICES | Facility: HOSPITAL | Age: 40
LOS: 1 days | End: 2020-11-16
Payer: COMMERCIAL

## 2020-11-16 ENCOUNTER — APPOINTMENT (OUTPATIENT)
Dept: ULTRASOUND IMAGING | Facility: CLINIC | Age: 40
End: 2020-11-16
Payer: COMMERCIAL

## 2020-11-16 DIAGNOSIS — N13.5 CROSSING VESSEL AND STRICTURE OF URETER WITHOUT HYDRONEPHROSIS: Chronic | ICD-10-CM

## 2020-11-16 DIAGNOSIS — N13.30 UNSPECIFIED HYDRONEPHROSIS: Chronic | ICD-10-CM

## 2020-11-16 DIAGNOSIS — N83.201 UNSPECIFIED OVARIAN CYST, RIGHT SIDE: ICD-10-CM

## 2020-11-16 DIAGNOSIS — Z87.448 PERSONAL HISTORY OF OTHER DISEASES OF URINARY SYSTEM: Chronic | ICD-10-CM

## 2020-11-16 PROCEDURE — 76830 TRANSVAGINAL US NON-OB: CPT | Mod: 26

## 2020-11-16 PROCEDURE — 76830 TRANSVAGINAL US NON-OB: CPT

## 2020-12-02 ENCOUNTER — OUTPATIENT (OUTPATIENT)
Dept: OUTPATIENT SERVICES | Facility: HOSPITAL | Age: 40
LOS: 1 days | End: 2020-12-02

## 2020-12-02 ENCOUNTER — APPOINTMENT (OUTPATIENT)
Dept: OBGYN | Facility: HOSPITAL | Age: 40
End: 2020-12-02
Payer: COMMERCIAL

## 2020-12-02 VITALS
TEMPERATURE: 98.3 F | BODY MASS INDEX: 30.39 KG/M2 | DIASTOLIC BLOOD PRESSURE: 73 MMHG | SYSTOLIC BLOOD PRESSURE: 110 MMHG | HEIGHT: 64 IN | HEART RATE: 76 BPM | WEIGHT: 178 LBS

## 2020-12-02 DIAGNOSIS — Z87.448 PERSONAL HISTORY OF OTHER DISEASES OF URINARY SYSTEM: Chronic | ICD-10-CM

## 2020-12-02 DIAGNOSIS — N13.5 CROSSING VESSEL AND STRICTURE OF URETER WITHOUT HYDRONEPHROSIS: Chronic | ICD-10-CM

## 2020-12-02 DIAGNOSIS — N13.30 UNSPECIFIED HYDRONEPHROSIS: Chronic | ICD-10-CM

## 2020-12-02 PROCEDURE — 99213 OFFICE O/P EST LOW 20 MIN: CPT | Mod: GE

## 2020-12-08 DIAGNOSIS — N83.201 UNSPECIFIED OVARIAN CYST, RIGHT SIDE: ICD-10-CM

## 2020-12-23 PROBLEM — H66.90 EAR INFECTION: Status: RESOLVED | Noted: 2020-07-27 | Resolved: 2020-12-23

## 2021-01-20 NOTE — H&P PST ADULT - NS PRO TALK SOMEONE YN
LMOM for patient to contact office to get his appointment on 1/29/21 rescheduled (provider out of office). no

## 2021-05-18 ENCOUNTER — RESULT REVIEW (OUTPATIENT)
Age: 41
End: 2021-05-18

## 2021-05-18 ENCOUNTER — APPOINTMENT (OUTPATIENT)
Dept: ULTRASOUND IMAGING | Facility: CLINIC | Age: 41
End: 2021-05-18
Payer: COMMERCIAL

## 2021-05-18 ENCOUNTER — OUTPATIENT (OUTPATIENT)
Dept: OUTPATIENT SERVICES | Facility: HOSPITAL | Age: 41
LOS: 1 days | End: 2021-05-18
Payer: COMMERCIAL

## 2021-05-18 DIAGNOSIS — N13.5 CROSSING VESSEL AND STRICTURE OF URETER WITHOUT HYDRONEPHROSIS: Chronic | ICD-10-CM

## 2021-05-18 DIAGNOSIS — N13.30 UNSPECIFIED HYDRONEPHROSIS: Chronic | ICD-10-CM

## 2021-05-18 DIAGNOSIS — Z87.448 PERSONAL HISTORY OF OTHER DISEASES OF URINARY SYSTEM: Chronic | ICD-10-CM

## 2021-05-18 DIAGNOSIS — N83.201 UNSPECIFIED OVARIAN CYST, RIGHT SIDE: ICD-10-CM

## 2021-05-18 PROCEDURE — 76830 TRANSVAGINAL US NON-OB: CPT | Mod: 26

## 2021-05-18 PROCEDURE — 76830 TRANSVAGINAL US NON-OB: CPT

## 2021-05-20 ENCOUNTER — NON-APPOINTMENT (OUTPATIENT)
Age: 41
End: 2021-05-20

## 2021-06-02 ENCOUNTER — APPOINTMENT (OUTPATIENT)
Dept: OBGYN | Facility: HOSPITAL | Age: 41
End: 2021-06-02
Payer: COMMERCIAL

## 2021-06-02 ENCOUNTER — RESULT REVIEW (OUTPATIENT)
Age: 41
End: 2021-06-02

## 2021-06-02 ENCOUNTER — OUTPATIENT (OUTPATIENT)
Dept: OUTPATIENT SERVICES | Facility: HOSPITAL | Age: 41
LOS: 1 days | End: 2021-06-02

## 2021-06-02 VITALS
DIASTOLIC BLOOD PRESSURE: 67 MMHG | WEIGHT: 185 LBS | BODY MASS INDEX: 31.58 KG/M2 | SYSTOLIC BLOOD PRESSURE: 113 MMHG | HEART RATE: 77 BPM | TEMPERATURE: 97.9 F | HEIGHT: 64 IN

## 2021-06-02 DIAGNOSIS — N13.5 CROSSING VESSEL AND STRICTURE OF URETER WITHOUT HYDRONEPHROSIS: Chronic | ICD-10-CM

## 2021-06-02 DIAGNOSIS — Z87.448 PERSONAL HISTORY OF OTHER DISEASES OF URINARY SYSTEM: Chronic | ICD-10-CM

## 2021-06-02 DIAGNOSIS — N13.30 UNSPECIFIED HYDRONEPHROSIS: Chronic | ICD-10-CM

## 2021-06-02 PROCEDURE — ZZZZZ: CPT

## 2021-06-02 RX ORDER — NAPROXEN 500 MG/1
500 TABLET ORAL
Qty: 30 | Refills: 3 | Status: DISCONTINUED | COMMUNITY
Start: 2019-05-22 | End: 2021-06-02

## 2021-06-02 NOTE — COUNSELING
[Preconception Care/ Fertility options] : preconception care, fertility options [STD (testing, results, tx)] : STD (testing, results, tx) [Lab Results] : lab results

## 2021-06-03 NOTE — PLAN
[FreeTextEntry1] : Patient is a 42yo G0 who presents for routine annual examination and interval follow up of known b/l ovarian cysts.\par \par #Primary infertility\par -Likely multifactorial in setting of patient age and known endometriosis\par -Patient s/p HSG 2019, report not on file however patient states wnl. Patient to obtain.\par -Semen analysis on file in 2019 indicates abnormal morphology and progression, however patient reports partner treated and repeat analysis wnl. To obtain further records.\par -2019 fertility work up initiated however FSH, Estradiol, and AMH not on file. Will resend all labs.\par -Referral for [facility] clinic given.\par \par #preventative\par -Pap 2019 NILM, No HPV found. Repeat in 2022.\par -GC/CT obtained today, follow up results\par -Referral given for IM as patient does not have a PMD \par \par #b/l small endometriomas\par -minimal interval growth\par -continue prn tx with NSAIDs as patient attempting to conceive\par -RTC in 1y or PRN\par \par Samantha Lafleur, PGY3\par D/W Dr. Alva\par

## 2021-06-03 NOTE — PHYSICAL EXAM
Patient is returning a call from clinic/ MA.  Please contact patient back [phone].      If not available, patient is okay with a detailed voicemail yes     Patient stated if he does not answer, leave message the first time and then call him right back again.    Patient stated it is in regards to test results.     [Appropriately responsive] : appropriately responsive [Alert] : alert [No Acute Distress] : no acute distress [Soft] : soft [Non-tender] : non-tender [Non-distended] : non-distended [Oriented x3] : oriented x3 [Examination Of The Breasts] : a normal appearance [No Masses] : no breast masses were palpable [Labia Majora] : normal [Labia Minora] : normal [Normal] : normal [Uterine Adnexae] : non-palpable [Tenderness] : nontender [Enlarged ___ wks] : not enlarged

## 2021-06-03 NOTE — HISTORY OF PRESENT ILLNESS
[Normal Amount/Duration] :  normal amount and duration [Regular Cycle Intervals] : periods have been regular [Frequency: Q ___ days] : menstrual periods occur approximately every [unfilled] days [Currently Active] : currently active [Men] : men [No] : No [Patient would like to be screened for STIs] : Patient would like to be screened for STIs [PapSmeardate] : 2019 [TextBox_31] : MEL [FreeTextEntry1] : 5/10/2021

## 2021-06-04 DIAGNOSIS — N97.9 FEMALE INFERTILITY, UNSPECIFIED: ICD-10-CM

## 2021-06-04 DIAGNOSIS — N80.9 ENDOMETRIOSIS, UNSPECIFIED: ICD-10-CM

## 2021-06-04 DIAGNOSIS — Z00.00 ENCOUNTER FOR GENERAL ADULT MEDICAL EXAMINATION WITHOUT ABNORMAL FINDINGS: ICD-10-CM

## 2021-06-09 ENCOUNTER — RESULT REVIEW (OUTPATIENT)
Age: 41
End: 2021-06-09

## 2021-06-09 LAB
HCV AB S/CO SERPL IA: 0.14 S/CO — SIGNIFICANT CHANGE UP (ref 0–0.99)
HCV AB SERPL-IMP: SIGNIFICANT CHANGE UP
HIV 1+2 AB+HIV1 P24 AG SERPL QL IA: SIGNIFICANT CHANGE UP
PROLACTIN SERPL-MCNC: 12.6 NG/ML — SIGNIFICANT CHANGE UP (ref 3.4–24.1)
T PALLIDUM AB TITR SER: NEGATIVE — SIGNIFICANT CHANGE UP
T4 FREE SERPL-MCNC: 1.1 NG/DL — SIGNIFICANT CHANGE UP (ref 0.9–1.8)
TSH SERPL-MCNC: 0.93 UIU/ML — SIGNIFICANT CHANGE UP (ref 0.27–4.2)

## 2021-06-13 LAB — ANTI-MULLERIAN HORMONE: 0.04 NG/ML — SIGNIFICANT CHANGE UP

## 2021-07-17 NOTE — PROVIDER CONTACT NOTE (OTHER) - SITUATION
From: Cesar Simpson  To: Vlad Diaz MD  Sent: 7/16/2021 5:50 PM CDT  Subject: Visit Follow-up Question    This message is being sent by Roxana Neo on behalf of Cesar Simpson.     Trevon Grier,    I had taken my daughter to be check by Message from [employer]:  Original authorizing provider: MD Shahid Delarosa would like a refill of the following medications:  metoprolol (TOPROL-XL) 100 MG 24 hr tablet [Gume Brown MD]  omeprazole (PRILOSEC) 20 MG capsule [Gume Brown MD]    Preferred pharmacy: [facility] [place], [place] - [address] AT [facility]    Comment:      Medication renewals requested in this message routed to other providers:  amiodarone (PACERONE/CODARONE) 200 MG tablet [Adiel Aden MD]   temp 101, hr 113, bp 106/66, RR 16, 100% on room air

## 2021-09-23 ENCOUNTER — APPOINTMENT (OUTPATIENT)
Dept: INTERNAL MEDICINE | Facility: CLINIC | Age: 41
End: 2021-09-23
Payer: COMMERCIAL

## 2021-09-23 ENCOUNTER — RESULT REVIEW (OUTPATIENT)
Age: 41
End: 2021-09-23

## 2021-09-23 ENCOUNTER — OUTPATIENT (OUTPATIENT)
Dept: OUTPATIENT SERVICES | Facility: HOSPITAL | Age: 41
LOS: 1 days | End: 2021-09-23

## 2021-09-23 VITALS
OXYGEN SATURATION: 99 % | DIASTOLIC BLOOD PRESSURE: 68 MMHG | HEART RATE: 66 BPM | BODY MASS INDEX: 31.07 KG/M2 | HEIGHT: 64 IN | WEIGHT: 182 LBS | SYSTOLIC BLOOD PRESSURE: 110 MMHG

## 2021-09-23 DIAGNOSIS — N13.5 CROSSING VESSEL AND STRICTURE OF URETER WITHOUT HYDRONEPHROSIS: Chronic | ICD-10-CM

## 2021-09-23 DIAGNOSIS — Z87.448 PERSONAL HISTORY OF OTHER DISEASES OF URINARY SYSTEM: Chronic | ICD-10-CM

## 2021-09-23 DIAGNOSIS — N13.30 UNSPECIFIED HYDRONEPHROSIS: Chronic | ICD-10-CM

## 2021-09-23 DIAGNOSIS — E66.9 OBESITY, UNSPECIFIED: ICD-10-CM

## 2021-09-23 LAB
A1C WITH ESTIMATED AVERAGE GLUCOSE RESULT: 5.5 % — SIGNIFICANT CHANGE UP (ref 4–5.6)
ALBUMIN SERPL ELPH-MCNC: 4.6 G/DL — SIGNIFICANT CHANGE UP (ref 3.3–5)
ALP SERPL-CCNC: 76 U/L — SIGNIFICANT CHANGE UP (ref 40–120)
ALT FLD-CCNC: 17 U/L — SIGNIFICANT CHANGE UP (ref 4–33)
ANION GAP SERPL CALC-SCNC: 12 MMOL/L — SIGNIFICANT CHANGE UP (ref 7–14)
AST SERPL-CCNC: 16 U/L — SIGNIFICANT CHANGE UP (ref 4–32)
BASOPHILS # BLD AUTO: 0.03 K/UL — SIGNIFICANT CHANGE UP (ref 0–0.2)
BASOPHILS NFR BLD AUTO: 0.4 % — SIGNIFICANT CHANGE UP (ref 0–2)
BILIRUB SERPL-MCNC: <0.2 MG/DL — SIGNIFICANT CHANGE UP (ref 0.2–1.2)
BUN SERPL-MCNC: 11 MG/DL — SIGNIFICANT CHANGE UP (ref 7–23)
CALCIUM SERPL-MCNC: 9.2 MG/DL — SIGNIFICANT CHANGE UP (ref 8.4–10.5)
CHLORIDE SERPL-SCNC: 107 MMOL/L — SIGNIFICANT CHANGE UP (ref 98–107)
CHOLEST SERPL-MCNC: 224 MG/DL — HIGH
CO2 SERPL-SCNC: 23 MMOL/L — SIGNIFICANT CHANGE UP (ref 22–31)
CREAT SERPL-MCNC: 0.66 MG/DL — SIGNIFICANT CHANGE UP (ref 0.5–1.3)
EOSINOPHIL # BLD AUTO: 0.17 K/UL — SIGNIFICANT CHANGE UP (ref 0–0.5)
EOSINOPHIL NFR BLD AUTO: 2.3 % — SIGNIFICANT CHANGE UP (ref 0–6)
ESTIMATED AVERAGE GLUCOSE: 111 — SIGNIFICANT CHANGE UP
GLUCOSE SERPL-MCNC: 100 MG/DL — HIGH (ref 70–99)
HCT VFR BLD CALC: 37.8 % — SIGNIFICANT CHANGE UP (ref 34.5–45)
HDLC SERPL-MCNC: 53 MG/DL — SIGNIFICANT CHANGE UP
HGB BLD-MCNC: 12.3 G/DL — SIGNIFICANT CHANGE UP (ref 11.5–15.5)
IANC: 4.12 K/UL — SIGNIFICANT CHANGE UP (ref 1.5–8.5)
IMM GRANULOCYTES NFR BLD AUTO: 0.3 % — SIGNIFICANT CHANGE UP (ref 0–1.5)
LIPID PNL WITH DIRECT LDL SERPL: 150 MG/DL — HIGH
LYMPHOCYTES # BLD AUTO: 2.49 K/UL — SIGNIFICANT CHANGE UP (ref 1–3.3)
LYMPHOCYTES # BLD AUTO: 34.2 % — SIGNIFICANT CHANGE UP (ref 13–44)
MCHC RBC-ENTMCNC: 27.6 PG — SIGNIFICANT CHANGE UP (ref 27–34)
MCHC RBC-ENTMCNC: 32.5 GM/DL — SIGNIFICANT CHANGE UP (ref 32–36)
MCV RBC AUTO: 84.9 FL — SIGNIFICANT CHANGE UP (ref 80–100)
MONOCYTES # BLD AUTO: 0.45 K/UL — SIGNIFICANT CHANGE UP (ref 0–0.9)
MONOCYTES NFR BLD AUTO: 6.2 % — SIGNIFICANT CHANGE UP (ref 2–14)
NEUTROPHILS # BLD AUTO: 4.12 K/UL — SIGNIFICANT CHANGE UP (ref 1.8–7.4)
NEUTROPHILS NFR BLD AUTO: 56.6 % — SIGNIFICANT CHANGE UP (ref 43–77)
NON HDL CHOLESTEROL: 171 MG/DL — HIGH
NRBC # BLD: 0 /100 WBCS — SIGNIFICANT CHANGE UP
NRBC # FLD: 0 K/UL — SIGNIFICANT CHANGE UP
PLATELET # BLD AUTO: 274 K/UL — SIGNIFICANT CHANGE UP (ref 150–400)
POTASSIUM SERPL-MCNC: 4.6 MMOL/L — SIGNIFICANT CHANGE UP (ref 3.5–5.3)
POTASSIUM SERPL-SCNC: 4.6 MMOL/L — SIGNIFICANT CHANGE UP (ref 3.5–5.3)
PROT SERPL-MCNC: 7.8 G/DL — SIGNIFICANT CHANGE UP (ref 6–8.3)
RBC # BLD: 4.45 M/UL — SIGNIFICANT CHANGE UP (ref 3.8–5.2)
RBC # FLD: 16.3 % — HIGH (ref 10.3–14.5)
SODIUM SERPL-SCNC: 142 MMOL/L — SIGNIFICANT CHANGE UP (ref 135–145)
TRIGL SERPL-MCNC: 106 MG/DL — SIGNIFICANT CHANGE UP
WBC # BLD: 7.28 K/UL — SIGNIFICANT CHANGE UP (ref 3.8–10.5)
WBC # FLD AUTO: 7.28 K/UL — SIGNIFICANT CHANGE UP (ref 3.8–10.5)

## 2021-09-23 PROCEDURE — ZZZZZ: CPT

## 2021-09-23 RX ORDER — IBUPROFEN 200 MG/1
200 TABLET, FILM COATED ORAL
Refills: 0 | Status: ACTIVE | COMMUNITY
Start: 2021-09-23

## 2021-09-23 RX ORDER — LIDOCAINE 40 MG/G
4 PATCH TOPICAL
Qty: 30 | Refills: 3 | Status: ACTIVE | COMMUNITY
Start: 2021-09-23 | End: 1900-01-01

## 2021-09-24 NOTE — END OF VISIT
[] : Resident [FreeTextEntry3] : 40 y/o F hx of endometriosis & UPJ obstruction s/p pyeloplasty presenting for follow up. Notes right upper back/trapezial pain likely related to repetitive lifting at her job-will treat symptomatically. Plan for influenza & Tdap vaccinations, CBC/CMP/Lipid/A1c, Renal US as requested by urology for surveillance imaging & nutrionist referral.

## 2021-09-24 NOTE — HISTORY OF PRESENT ILLNESS
[FreeTextEntry1] : establishment of care [de-identified] : 41 Bengali speaking F with endometriosis, ovarian cysts likely endometriomas hx of congenital L. UPJ obstruction (s/p pyeloplasty and distal ureteral stricture 2/2 ?left adnexal cyst s/p balloon dilation and cyst excision, nephrostomy tube placement) here for establishment of care. Pt notes R. shoulder pain that began 4-6 months. Described as stabbing sensation, worse with movement, no radiation and improves with motrin use. 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 \par \par Endometriosis: pt notes that her abdominal pain and cramping begins and extends into menstrual period. Pt is suffering from infertility likely 2/2 to this; TABATHA procedures are not afforable for her and partner. LMP: 9/12, normal amount and duration. \par \par Congenital L. UPJ obstruction: pt notes she is able to urinate without any difficulties. No hesitancy, no dysuria, no hematuria. Was unable to get Renal US as per urology note. \par \par PMHx:  endometriosis, ovarian cysts likely endometriomas hx of congenital L. UPJ obstruction (s/p pyeloplasty and distal ureteral stricture 2/2 ?left adnexal cyst s/p balloon dilation and cyst excision, nephrostomy tube placement)\par Meds: motrin PRN, multivitamin \par Social Hx: denies cigarette, alcohol, recreational drug use. 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

## 2021-09-24 NOTE — PHYSICAL EXAM
[No Acute Distress] : no acute distress [Well Nourished] : well nourished [Well Developed] : well developed [Normal Sclera/Conjunctiva] : normal sclera/conjunctiva [Well-Appearing] : well-appearing [EOMI] : extraocular movements intact [No JVD] : no jugular venous distention [No Lymphadenopathy] : no lymphadenopathy [Supple] : supple [Thyroid Normal, No Nodules] : the thyroid was normal and there were no nodules present [No Respiratory Distress] : no respiratory distress  [No Accessory Muscle Use] : no accessory muscle use [Clear to Auscultation] : lungs were clear to auscultation bilaterally [Normal Rate] : normal rate  [Regular Rhythm] : with a regular rhythm [Soft] : abdomen soft [Normal S1, S2] : normal S1 and S2 [Non Tender] : non-tender [Non-distended] : non-distended [Normal Bowel Sounds] : normal bowel sounds [Normal Posterior Cervical Nodes] : no posterior cervical lymphadenopathy [Normal Anterior Cervical Nodes] : no anterior cervical lymphadenopathy [No CVA Tenderness] : no CVA  tenderness [No Joint Swelling] : no joint swelling [Grossly Normal Strength/Tone] : grossly normal strength/tone [Coordination Grossly Intact] : coordination grossly intact [No Focal Deficits] : no focal deficits [Normal Affect] : the affect was normal [Normal Insight/Judgement] : insight and judgment were intact [de-identified] : full ROM in B/L shoulders; no rashes noted in the area; pain is noted on palpation of R. scapula, neg empty can test

## 2021-09-24 NOTE — REVIEW OF SYSTEMS
[Fever] : no fever [Chills] : no chills [Fatigue] : no fatigue [Night Sweats] : no night sweats [Recent Change In Weight] : ~T no recent weight change [Pain] : no pain [Redness] : no redness [Vision Problems] : no vision problems [Chest Pain] : no chest pain [Hearing Loss] : no hearing loss [Palpitations] : no palpitations [Leg Claudication] : no leg claudication [Lower Ext Edema] : no lower extremity edema [Shortness Of Breath] : no shortness of breath [Wheezing] : no wheezing [Cough] : no cough [Abdominal Pain] : no abdominal pain [Dyspnea on Exertion] : no dyspnea on exertion [Nausea] : no nausea [Constipation] : no constipation [Diarrhea] : diarrhea [Vomiting] : no vomiting [Dysuria] : no dysuria [Incontinence] : no incontinence [Nocturia] : no nocturia [Hematuria] : no hematuria [Joint Pain] : no joint pain [Joint Stiffness] : no joint stiffness [Joint Swelling] : no joint swelling [Muscle Pain] : no muscle pain [Itching] : no itching [Nail Changes] : no nail changes [Headache] : no headache [Dizziness] : no dizziness [Fainting] : no fainting [Confusion] : no confusion [Unsteady Walking] : no ataxia [Anxiety] : no anxiety [Depression] : no depression [Easy Bleeding] : no easy bleeding [Easy Bruising] : no easy bruising [Swollen Glands] : no swollen glands

## 2021-09-24 NOTE — ASSESSMENT
[FreeTextEntry1] : #HCM\par Pap: last in 2019 (NILM); due in 2022\par Flu: today\par COVID: received; Pfizer\par Tdap: to be given at next visit \par \par Discussed case with Dr. Esquivel\par RTC in 6 months for follow-up \par \par Madeline Aquino, PGY1\par Firm [facility]

## 2021-09-27 ENCOUNTER — APPOINTMENT (OUTPATIENT)
Dept: INTERNAL MEDICINE | Facility: CLINIC | Age: 41
End: 2021-09-27

## 2021-09-27 ENCOUNTER — OUTPATIENT (OUTPATIENT)
Dept: OUTPATIENT SERVICES | Facility: HOSPITAL | Age: 41
LOS: 1 days | End: 2021-09-27

## 2021-09-27 VITALS — TEMPERATURE: 98.3 F

## 2021-09-27 DIAGNOSIS — Z87.448 PERSONAL HISTORY OF OTHER DISEASES OF URINARY SYSTEM: Chronic | ICD-10-CM

## 2021-09-27 DIAGNOSIS — E66.9 OBESITY, UNSPECIFIED: ICD-10-CM

## 2021-09-27 DIAGNOSIS — N13.5 CROSSING VESSEL AND STRICTURE OF URETER WITHOUT HYDRONEPHROSIS: Chronic | ICD-10-CM

## 2021-09-27 DIAGNOSIS — N13.30 UNSPECIFIED HYDRONEPHROSIS: Chronic | ICD-10-CM

## 2021-09-30 ENCOUNTER — OUTPATIENT (OUTPATIENT)
Dept: OUTPATIENT SERVICES | Facility: HOSPITAL | Age: 41
LOS: 1 days | End: 2021-09-30
Payer: COMMERCIAL

## 2021-09-30 ENCOUNTER — APPOINTMENT (OUTPATIENT)
Dept: ULTRASOUND IMAGING | Facility: CLINIC | Age: 41
End: 2021-09-30
Payer: COMMERCIAL

## 2021-09-30 DIAGNOSIS — E66.9 OBESITY, UNSPECIFIED: ICD-10-CM

## 2021-09-30 DIAGNOSIS — N13.5 CROSSING VESSEL AND STRICTURE OF URETER WITHOUT HYDRONEPHROSIS: ICD-10-CM

## 2021-09-30 DIAGNOSIS — N13.5 CROSSING VESSEL AND STRICTURE OF URETER WITHOUT HYDRONEPHROSIS: Chronic | ICD-10-CM

## 2021-09-30 DIAGNOSIS — M25.511 PAIN IN RIGHT SHOULDER: ICD-10-CM

## 2021-09-30 DIAGNOSIS — Z23 ENCOUNTER FOR IMMUNIZATION: ICD-10-CM

## 2021-09-30 DIAGNOSIS — N13.30 UNSPECIFIED HYDRONEPHROSIS: Chronic | ICD-10-CM

## 2021-09-30 DIAGNOSIS — Z87.448 PERSONAL HISTORY OF OTHER DISEASES OF URINARY SYSTEM: Chronic | ICD-10-CM

## 2021-09-30 PROCEDURE — 76775 US EXAM ABDO BACK WALL LIM: CPT

## 2021-09-30 PROCEDURE — 76775 US EXAM ABDO BACK WALL LIM: CPT | Mod: 26

## 2021-10-08 ENCOUNTER — APPOINTMENT (OUTPATIENT)
Dept: UROLOGY | Facility: CLINIC | Age: 41
End: 2021-10-08

## 2021-10-08 ENCOUNTER — OUTPATIENT (OUTPATIENT)
Dept: OUTPATIENT SERVICES | Facility: HOSPITAL | Age: 41
LOS: 1 days | End: 2021-10-08
Payer: SELF-PAY

## 2021-10-08 DIAGNOSIS — Z87.448 PERSONAL HISTORY OF OTHER DISEASES OF URINARY SYSTEM: Chronic | ICD-10-CM

## 2021-10-08 DIAGNOSIS — N13.30 UNSPECIFIED HYDRONEPHROSIS: Chronic | ICD-10-CM

## 2021-10-08 DIAGNOSIS — N13.5 CROSSING VESSEL AND STRICTURE OF URETER WITHOUT HYDRONEPHROSIS: Chronic | ICD-10-CM

## 2021-10-08 PROCEDURE — G0463: CPT

## 2021-10-08 NOTE — HISTORY OF PRESENT ILLNESS
[FreeTextEntry1] : 40 year old female with history of a left UPJ obstruction s/p pyeloplasty (July 2017), complicated by residual distal ureteral stricture s/p ureteroscopy and biopsy w/ failed stent placement and subsequent NT placement (Feb 2018), s/p left ureteroscopy with fulguration/biopsy and ureteral dilation (3/13/2018)\par with successful stent removal. Concern for persistent obstruction 2/2 left adnexal cyst s/p laparoscopic ovarian cyst removal (11/2019), following up for recent hospitalization for urinary tract infection 9/12-/13. Patient was having several days of left lower back pain and hematuria, afebrile, WBC 10.8, SCr 0.69, Urine culture pan sensitive E coli. CT imaging on admission demonstrated unchanged left moderate hydroureteronephrosis to level of distal left ureter without evidence of obstruction and left adnexal cystic lesion similar to 04/09/2019 suggesting benign ovarian neoplasm.\par \par Interval history: \par Patient has felt well. She has no symptoms suggesting obstruction. She denies flank pain, recent urinary infections, difficulty urinating, dysuria, fevers/chills, nausea/vomiting.

## 2021-10-08 NOTE — PHYSICAL EXAM
[General Appearance - Well Developed] : well developed [General Appearance - Well Nourished] : well nourished [Abdomen Soft] : soft [Skin Color & Pigmentation] : normal skin color and pigmentation [Heart Rate And Rhythm] : Heart rate and rhythm were normal [] : no respiratory distress [FreeTextEntry1] : No CVA tenderness

## 2021-10-08 NOTE — ASSESSMENT
[FreeTextEntry1] : 40 year old female with history of UPJ obstruction s/p pyeloplasty and distal ureteral stricture 2/2 ?left adnexal cyst s/p balloon dilation and cyst excision, treated for Ecoli UTI comes in for routine US appointment. \par \par Renal US: mild left hydronephrosis. no stones, masses, cysts. bilateral ureteral jets seen. Patient without symptoms. \par \par --follow up to Urology clinic in 1 year with repeat renal US\par --counseled on recurrent UTI signs and to call/RTC \par

## 2021-10-10 NOTE — ED CDU PROVIDER DISPOSITION NOTE - ATTENDING CONTRIBUTION TO CARE
39 y/o F chronic hydroureteronephrosis sp ureteral stent and nephrostomy in the pain, ovarian cysts sp operative intervention, here c/o dysuria, hematuria, and L flank pain x 3 days. CT with unchanged hydronephrosis. PT placed in CDU for observation over night. States her pain has improved, was seen by urology. Currently states pain in 2-3 /10 tolerated PO. pt told she needs 10 days of antibiotics and will need to f/u with her urology and clinic. cefpodoxime 200mg BID sent to pharmacy.  denies fever, chills, chest pain, , diarrhea, dysuria, syncope, bleeding, new rash,weakness,    ROS  otherwise negative as per HPI  Gen: Awake, Alert, WD, WN, NAD  Head:  NC/AT  Eyes:  PERRL, EOMI, Conjunctiva pink,   ENT:  moist mucus membranes  Neck: supple, nontender, no meningismus, no JVD, trachea midline  Cardiac/CV:  S1 S2, RRR, no M/G/R  Respiratory/Pulm:  CTAB, good air movement, normal resp effort, no wheezes/stridor/retractions/rales/rhonchi  Gastrointestinal/Abdomen:  Soft, minimal suprapubic tenderness, nondistended, +BS, no rebound/guarding  Back:  no CVAT, no MLT  Ext:  warm, well perfused, moving all extremities spontaneously, no peripheral edema, distal pulses intact  Skin: intact, no rash  Neuro:  AAOx3, sensation intact, motor 5/5 x 4 extremities, normal gait, speech clear  CDU plan of care:  39 y/o F chronic hydroureteronephrosis sp ureteral stent and nephrostomy in the pain, ovarian cysts sp operative intervention, here c/o dysuria, hematuria, and L flank pain x 3 days. CT with unchanged hydronephrosis seen by urology tolerating PO, will send on cefpodoxime and f/u w/ urology and clinic, culture results pending . No deformities present (2) assistive person

## 2021-10-25 DIAGNOSIS — N13.30 UNSPECIFIED HYDRONEPHROSIS: ICD-10-CM

## 2021-10-25 DIAGNOSIS — N13.5 CROSSING VESSEL AND STRICTURE OF URETER WITHOUT HYDRONEPHROSIS: ICD-10-CM

## 2022-02-18 NOTE — ED CDU PROVIDER SUBSEQUENT DAY NOTE - MUSCULOSKELETAL, MLM
denies pain/discomfort Spine appears normal, range of motion is not limited, no muscle or joint tenderness

## 2022-04-18 ENCOUNTER — OUTPATIENT (OUTPATIENT)
Dept: OUTPATIENT SERVICES | Facility: HOSPITAL | Age: 42
LOS: 1 days | End: 2022-04-18

## 2022-04-18 ENCOUNTER — APPOINTMENT (OUTPATIENT)
Dept: INTERNAL MEDICINE | Facility: CLINIC | Age: 42
End: 2022-04-18
Payer: COMMERCIAL

## 2022-04-18 VITALS
HEART RATE: 71 BPM | WEIGHT: 185.6 LBS | TEMPERATURE: 98 F | DIASTOLIC BLOOD PRESSURE: 80 MMHG | BODY MASS INDEX: 31.69 KG/M2 | SYSTOLIC BLOOD PRESSURE: 140 MMHG | HEIGHT: 64 IN | OXYGEN SATURATION: 99 %

## 2022-04-18 VITALS — DIASTOLIC BLOOD PRESSURE: 70 MMHG | SYSTOLIC BLOOD PRESSURE: 115 MMHG

## 2022-04-18 DIAGNOSIS — M25.511 PAIN IN RIGHT SHOULDER: ICD-10-CM

## 2022-04-18 DIAGNOSIS — N80.9 ENDOMETRIOSIS, UNSPECIFIED: ICD-10-CM

## 2022-04-18 DIAGNOSIS — N13.30 UNSPECIFIED HYDRONEPHROSIS: ICD-10-CM

## 2022-04-18 DIAGNOSIS — N13.5 CROSSING VESSEL AND STRICTURE OF URETER W/OUT HYDRONEPHROSIS: ICD-10-CM

## 2022-04-18 DIAGNOSIS — N13.5 CROSSING VESSEL AND STRICTURE OF URETER WITHOUT HYDRONEPHROSIS: ICD-10-CM

## 2022-04-18 DIAGNOSIS — Z87.448 PERSONAL HISTORY OF OTHER DISEASES OF URINARY SYSTEM: Chronic | ICD-10-CM

## 2022-04-18 DIAGNOSIS — Z00.00 ENCOUNTER FOR GENERAL ADULT MEDICAL EXAMINATION W/OUT ABNORMAL FINDINGS: ICD-10-CM

## 2022-04-18 DIAGNOSIS — N13.5 CROSSING VESSEL AND STRICTURE OF URETER WITHOUT HYDRONEPHROSIS: Chronic | ICD-10-CM

## 2022-04-18 DIAGNOSIS — N13.30 UNSPECIFIED HYDRONEPHROSIS: Chronic | ICD-10-CM

## 2022-04-18 DIAGNOSIS — Z23 ENCOUNTER FOR IMMUNIZATION: ICD-10-CM

## 2022-04-18 DIAGNOSIS — Z00.00 ENCOUNTER FOR GENERAL ADULT MEDICAL EXAMINATION WITHOUT ABNORMAL FINDINGS: ICD-10-CM

## 2022-04-18 PROCEDURE — ZZZZZ: CPT | Mod: GE

## 2022-04-18 NOTE — HISTORY OF PRESENT ILLNESS
[de-identified] : 42 Arabic speaking F with endometriosis, ovarian cysts likely endometriomas hx of congenital L. UPJ obstruction (s/p pyeloplasty and distal ureteral stricture 2/2 ?left adnexal cyst s/p balloon dilation and cyst excision, nephrostomy tube placement) here for follow up visit. \par \par R shoulder pain that began 4-6 months ago as per last visit. Described as stabbing sensation, worse with movement, no radiation and improves with motrin use. Lifting makes the pain worse. Patient states pain is resolved and is no longer bothering her. \par \par Endometriosis: pt notes that her abdominal pain and cramping begins and extends into menstrual period. Pt is suffering from infertility likely 2/2 to this; TABATHA procedures are not affordable for her and partner. Menstrual cycle normal amount and duration. \par \par Congenital L. UPJ obstruction: pt notes she is able to urinate without any difficulties. No hesitancy, no dysuria, no hematuria. Patient obtained renal US which shows mild R hydro which is improved from mod hydro. Following w/ uro, NTD for now, f/u for repeat US in 1 year. \par \par HCM: Patient is COVID vaccinated x 3. Obtained Tdap at last visit. Due for pap smear which she will obtain w/ her Gyn.

## 2022-04-18 NOTE — PHYSICAL EXAM
[No Acute Distress] : no acute distress [Well Nourished] : well nourished [Well Developed] : well developed [Normal Sclera/Conjunctiva] : normal sclera/conjunctiva [Normal Outer Ear/Nose] : the outer ears and nose were normal in appearance [Supple] : supple [No Respiratory Distress] : no respiratory distress  [No Accessory Muscle Use] : no accessory muscle use [Normal Rate] : normal rate  [Regular Rhythm] : with a regular rhythm [Normal S1, S2] : normal S1 and S2 [Soft] : abdomen soft [Non Tender] : non-tender [Non-distended] : non-distended [No CVA Tenderness] : no CVA  tenderness [No Rash] : no rash [No Focal Deficits] : no focal deficits

## 2022-04-18 NOTE — PLAN
[FreeTextEntry1] : 41 Vietnamese speaking F with endometriosis, ovarian cysts likely endometriomas hx of congenital L. UPJ obstruction (s/p pyeloplasty and distal ureteral stricture 2/2 ?left adnexal cyst s/p balloon dilation and cyst excision, nephrostomy tube placement) here for f/u. \par \par #R Shoulder pain\par Sharp pain 4-6 months worse w/ movement, improved w/ motrin likely msk\par -Resolved\par \par #Endometriosis \par Hx of endometriosis c/b ovarian cyst\par -F/u Gyn for repeat pap\par -f/u w/ TABATHA \par \par #L UPJ Obstruction\par Renal US showing improvement of hydro w/ no intervention warranted\par - f/u uro; repeat US in 1 year\par - CTM for UTI sx\par \par #HLD\par LDL elevated w/ .6%  ASCVD\par -will ctm\par \par #HCM\par - UTD w/ vaccinations; COVID x 3 and states she got Tdap at last visit\par - Pap smear due; will obtain w/ gyn  \par \par Case discussed w/ Dr. Ace\par RTC in 1 year for annual\par \par Jan Reyes\par EMIM PGY-3

## 2022-04-24 ENCOUNTER — EMERGENCY (EMERGENCY)
Facility: HOSPITAL | Age: 42
LOS: 1 days | Discharge: HOME | End: 2022-04-24
Attending: STUDENT IN AN ORGANIZED HEALTH CARE EDUCATION/TRAINING PROGRAM | Admitting: STUDENT IN AN ORGANIZED HEALTH CARE EDUCATION/TRAINING PROGRAM
Payer: MEDICAID

## 2022-04-24 VITALS
HEIGHT: 64 IN | TEMPERATURE: 98 F | SYSTOLIC BLOOD PRESSURE: 116 MMHG | DIASTOLIC BLOOD PRESSURE: 64 MMHG | OXYGEN SATURATION: 99 % | HEART RATE: 66 BPM | RESPIRATION RATE: 16 BRPM

## 2022-04-24 DIAGNOSIS — Z87.448 PERSONAL HISTORY OF OTHER DISEASES OF URINARY SYSTEM: Chronic | ICD-10-CM

## 2022-04-24 DIAGNOSIS — N13.5 CROSSING VESSEL AND STRICTURE OF URETER WITHOUT HYDRONEPHROSIS: Chronic | ICD-10-CM

## 2022-04-24 DIAGNOSIS — N13.30 UNSPECIFIED HYDRONEPHROSIS: Chronic | ICD-10-CM

## 2022-04-24 LAB
ALBUMIN SERPL ELPH-MCNC: 4.1 G/DL — SIGNIFICANT CHANGE UP (ref 3.3–5)
ALP SERPL-CCNC: 88 U/L — SIGNIFICANT CHANGE UP (ref 40–120)
ALT FLD-CCNC: 19 U/L — SIGNIFICANT CHANGE UP (ref 4–33)
ANION GAP SERPL CALC-SCNC: 11 MMOL/L — SIGNIFICANT CHANGE UP (ref 7–14)
APPEARANCE UR: CLEAR — SIGNIFICANT CHANGE UP
AST SERPL-CCNC: 17 U/L — SIGNIFICANT CHANGE UP (ref 4–32)
BACTERIA # UR AUTO: NEGATIVE — SIGNIFICANT CHANGE UP
BASOPHILS # BLD AUTO: 0.03 K/UL — SIGNIFICANT CHANGE UP (ref 0–0.2)
BASOPHILS NFR BLD AUTO: 0.2 % — SIGNIFICANT CHANGE UP (ref 0–2)
BILIRUB SERPL-MCNC: <0.2 MG/DL — SIGNIFICANT CHANGE UP (ref 0.2–1.2)
BILIRUB UR-MCNC: NEGATIVE — SIGNIFICANT CHANGE UP
BUN SERPL-MCNC: 11 MG/DL — SIGNIFICANT CHANGE UP (ref 7–23)
CALCIUM SERPL-MCNC: 8.9 MG/DL — SIGNIFICANT CHANGE UP (ref 8.4–10.5)
CHLORIDE SERPL-SCNC: 105 MMOL/L — SIGNIFICANT CHANGE UP (ref 98–107)
CO2 SERPL-SCNC: 23 MMOL/L — SIGNIFICANT CHANGE UP (ref 22–31)
COLOR SPEC: YELLOW — SIGNIFICANT CHANGE UP
CREAT SERPL-MCNC: 0.66 MG/DL — SIGNIFICANT CHANGE UP (ref 0.5–1.3)
DIFF PNL FLD: NEGATIVE — SIGNIFICANT CHANGE UP
EGFR: 114 ML/MIN/1.73M2 — SIGNIFICANT CHANGE UP
EOSINOPHIL # BLD AUTO: 0.07 K/UL — SIGNIFICANT CHANGE UP (ref 0–0.5)
EOSINOPHIL NFR BLD AUTO: 0.5 % — SIGNIFICANT CHANGE UP (ref 0–6)
EPI CELLS # UR: 3 /HPF — SIGNIFICANT CHANGE UP (ref 0–5)
GLUCOSE SERPL-MCNC: 99 MG/DL — SIGNIFICANT CHANGE UP (ref 70–99)
GLUCOSE UR QL: NEGATIVE — SIGNIFICANT CHANGE UP
HCT VFR BLD CALC: 38.2 % — SIGNIFICANT CHANGE UP (ref 34.5–45)
HGB BLD-MCNC: 12.2 G/DL — SIGNIFICANT CHANGE UP (ref 11.5–15.5)
HYALINE CASTS # UR AUTO: 1 /LPF — SIGNIFICANT CHANGE UP (ref 0–7)
IANC: 11.45 K/UL — HIGH (ref 1.8–7.4)
IMM GRANULOCYTES NFR BLD AUTO: 0.3 % — SIGNIFICANT CHANGE UP (ref 0–1.5)
KETONES UR-MCNC: NEGATIVE — SIGNIFICANT CHANGE UP
LEUKOCYTE ESTERASE UR-ACNC: NEGATIVE — SIGNIFICANT CHANGE UP
LIDOCAIN IGE QN: 48 U/L — SIGNIFICANT CHANGE UP (ref 7–60)
LYMPHOCYTES # BLD AUTO: 1.36 K/UL — SIGNIFICANT CHANGE UP (ref 1–3.3)
LYMPHOCYTES # BLD AUTO: 10.1 % — LOW (ref 13–44)
MCHC RBC-ENTMCNC: 28.2 PG — SIGNIFICANT CHANGE UP (ref 27–34)
MCHC RBC-ENTMCNC: 31.9 GM/DL — LOW (ref 32–36)
MCV RBC AUTO: 88.2 FL — SIGNIFICANT CHANGE UP (ref 80–100)
MONOCYTES # BLD AUTO: 0.57 K/UL — SIGNIFICANT CHANGE UP (ref 0–0.9)
MONOCYTES NFR BLD AUTO: 4.2 % — SIGNIFICANT CHANGE UP (ref 2–14)
NEUTROPHILS # BLD AUTO: 11.45 K/UL — HIGH (ref 1.8–7.4)
NEUTROPHILS NFR BLD AUTO: 84.7 % — HIGH (ref 43–77)
NITRITE UR-MCNC: NEGATIVE — SIGNIFICANT CHANGE UP
NRBC # BLD: 0 /100 WBCS — SIGNIFICANT CHANGE UP
NRBC # FLD: 0 K/UL — SIGNIFICANT CHANGE UP
PH UR: 6 — SIGNIFICANT CHANGE UP (ref 5–8)
PLATELET # BLD AUTO: 262 K/UL — SIGNIFICANT CHANGE UP (ref 150–400)
POTASSIUM SERPL-MCNC: 3.9 MMOL/L — SIGNIFICANT CHANGE UP (ref 3.5–5.3)
POTASSIUM SERPL-SCNC: 3.9 MMOL/L — SIGNIFICANT CHANGE UP (ref 3.5–5.3)
PROT SERPL-MCNC: 7.1 G/DL — SIGNIFICANT CHANGE UP (ref 6–8.3)
PROT UR-MCNC: ABNORMAL
RBC # BLD: 4.33 M/UL — SIGNIFICANT CHANGE UP (ref 3.8–5.2)
RBC # FLD: 15.7 % — HIGH (ref 10.3–14.5)
RBC CASTS # UR COMP ASSIST: 3 /HPF — SIGNIFICANT CHANGE UP (ref 0–4)
SODIUM SERPL-SCNC: 139 MMOL/L — SIGNIFICANT CHANGE UP (ref 135–145)
SP GR SPEC: 1.02 — SIGNIFICANT CHANGE UP (ref 1–1.05)
UROBILINOGEN FLD QL: SIGNIFICANT CHANGE UP
WBC # BLD: 13.52 K/UL — HIGH (ref 3.8–10.5)
WBC # FLD AUTO: 13.52 K/UL — HIGH (ref 3.8–10.5)
WBC UR QL: 1 /HPF — SIGNIFICANT CHANGE UP (ref 0–5)

## 2022-04-24 PROCEDURE — 74177 CT ABD & PELVIS W/CONTRAST: CPT | Mod: 26,MA

## 2022-04-24 PROCEDURE — 76830 TRANSVAGINAL US NON-OB: CPT | Mod: 26

## 2022-04-24 PROCEDURE — 99284 EMERGENCY DEPT VISIT MOD MDM: CPT

## 2022-04-24 RX ORDER — KETOROLAC TROMETHAMINE 30 MG/ML
30 SYRINGE (ML) INJECTION ONCE
Refills: 0 | Status: DISCONTINUED | OUTPATIENT
Start: 2022-04-24 | End: 2022-04-24

## 2022-04-24 RX ORDER — ONDANSETRON 8 MG/1
4 TABLET, FILM COATED ORAL ONCE
Refills: 0 | Status: COMPLETED | OUTPATIENT
Start: 2022-04-24 | End: 2022-04-24

## 2022-04-24 RX ORDER — KETOROLAC TROMETHAMINE 30 MG/ML
15 SYRINGE (ML) INJECTION ONCE
Refills: 0 | Status: DISCONTINUED | OUTPATIENT
Start: 2022-04-24 | End: 2022-04-24

## 2022-04-24 RX ORDER — MORPHINE SULFATE 50 MG/1
2 CAPSULE, EXTENDED RELEASE ORAL ONCE
Refills: 0 | Status: DISCONTINUED | OUTPATIENT
Start: 2022-04-24 | End: 2022-04-24

## 2022-04-24 RX ADMIN — Medication 30 MILLIGRAM(S): at 15:18

## 2022-04-24 RX ADMIN — MORPHINE SULFATE 2 MILLIGRAM(S): 50 CAPSULE, EXTENDED RELEASE ORAL at 09:34

## 2022-04-24 RX ADMIN — ONDANSETRON 4 MILLIGRAM(S): 8 TABLET, FILM COATED ORAL at 09:34

## 2022-04-24 NOTE — ED ADULT NURSE NOTE - OBJECTIVE STATEMENT
43 y/o female presenting to intake room 4. Patient AAOX4, ambulatory at baseline. Patient coming to ER complaining of left and right lower quadrant abdominal pain. Patient denies chest pain, headache and dizziness. Translation used by NOEMI Meneses. Patient breathing even and unlabored. No pallor or diaphoresis noted at this time. Abdomen soft and tender upon palpation to both lower left and right quadrants. Bowel sounds present in all four quadrants. Pelvic examination done by NOEMI Meneses. Chaperoned as RN. Denies chest pain, headache and dizziness. #20g placed to LAC. Labs drawn and sent as per MD order. Medications administered as per MD order. Awaiting CT.

## 2022-04-24 NOTE — ED PROVIDER NOTE - PROGRESS NOTE DETAILS
PA Negra: pt w/ significantly improved abd pain. CTAP and us showing multiple cysts, hydrosalpinx. will d/c w/ f/u with gyn. Pt given return precautions on worsening pain. Stable for d/c Andre Omer: pt seen and eval by gyn at bedside. no other work up needed. Recommended f/u in 1-2 weeks. stable for d/c PA Negra: pt w/ significantly improved abd pain. CTAP and us showing multiple cysts, hydrosalpinx. pt still w/ pain will consult gyn for any further intervention

## 2022-04-24 NOTE — ED ADULT TRIAGE NOTE - INTERPRETATION SERVICES DECLINED
Progress Note    Date of Service: 4/16/2021    Assessment & Plan      1. Acute respiratory failure  2. Hypercapnia on venous blood gases  3. A. fib with RVR  4. WAQAS  5. DM  6. HTN     Plan and discussion:  Respiratory status seems to be stable on room air  Patient still in A. fib with heart rate in the 90s to 100s, cardiology on the case  Continue Eliquis  Prn Ipratropium  Case was discussed with RN     Subjective   As above    Allergies:                ALLERGIES:  Patient has no known allergies.    Home Meds:  Medications Prior to Admission   Medication Sig Dispense Refill   • metFORMIN (GLUCOPHAGE) 500 MG tablet Take 500 mg by mouth 2 times daily (with meals).     • citalopram (CeleXA) 20 MG tablet Take 20 mg by mouth daily.     • pravastatin (PRAVACHOL) 20 MG tablet Take 20 mg by mouth daily.     • apixaBAN (Eliquis) 5 MG Tab Take by mouth every 12 hours.     • metoPROLOL tartrate (LOPRESSOR) 25 MG tablet Take 25 mg by mouth every 12 hours.     • torsemide (DEMADEX) 20 MG tablet Take 20 mg by mouth daily.     • losartan (COZAAR) 25 MG tablet Take 25 mg by mouth 2 times daily.     • amitriptyline (ELAVIL) 10 MG tablet Take 10 mg by mouth nightly.       Immunizations & Other History:    There is no immunization history on file for this patient.    Social History     Socioeconomic History   • Marital status:      Spouse name: Not on file   • Number of children: Not on file   • Years of education: Not on file   • Highest education level: Not on file   Occupational History   • Not on file   Tobacco Use   • Smoking status: Never Smoker   • Smokeless tobacco: Never Used   Substance and Sexual Activity   • Alcohol use: Never   • Drug use: Never   • Sexual activity: Not on file   Other Topics Concern   • Not on file   Social History Narrative   • Not on file     Social Determinants of Health     Financial Resource Strain: Not on file   Food Insecurity: Not on file   Transportation Needs:    • Lack of Transportation  (Medical):    • Lack of Transportation (Non-Medical):    Physical Activity:    • Days of Exercise per Week:    • Minutes of Exercise per Session:    Stress: Not on file   Social Connections:    • Social Determinants: Social Connections:    Intimate Partner Violence: Not At Risk   • Social Determinants: Intimate Partner Violence Past Fear: 1   • Social Determinants: Intimate Partner Violence Current Fear: 1     History reviewed. No pertinent family history.    Review of Systems  GENERAL: feels well otherwise   SKIN: denies any unusual skin lesions   EYES: denies blurred vision or double vision   HEENT: denies nasal congestion,   RESPIRATORY:no hemoptysis   CARDIOVASCULAR: denies chest pain on exertion   GI: denies abdominal pain, denies heartburn   : denies nocturia or changes in stream   MUSCULOSKELETAL: denies back pain   NEURO: denies headaches   PSYCHE: denies depression or anxiety   HEMATOLOGIC: denies hx of anemia   ENDOCRINE: denies thyroid history    Objective   Vitals Ranges and Last Value:  Temp:  [96.8 °F (36 °C)-99 °F (37.2 °C)] 96.8 °F (36 °C)  Heart Rate:  [] 86  Resp:  [10-34] 18  BP: ()/() 127/75  Weight change:       Hemodynamics:     CVP     SVO2     ScVO2     PA Systolic/Diastolic     PCWP     Cardiac output     Cardiac index         Vent Settings Last Value   O2 2 L/min   Mode     Rate     Tidal Volume     Pressure Support     PEEP/CPAC/EPAP     FiO2 30 %   Peak Inspiratory Pressure     Plateau Pressure         Exam:  General:  Alert, cooperative, no distress, appears stated age.   Head:  Normocephalic, without obvious abnormality, atraumatic.   Eyes:  Conjunctivae/corneas clear.   Neck: Supple, symmetrical, trachea midline         Lungs:   Clear to auscultation bilaterally.         Heart:   Irregularly irregular, S1, S2 normal   Abdomen:   Soft, non-tender. Non distended   Extremities: Extremities normal, no clubbing, no cyanosis or edema.   Skin:  No rashes or lesions    Neurologic:  no focal deficits           Intake/Output last 3 shifts:  I/O last 3 completed shifts:  In: -   Out: 650 [Urine:650]  Intake/Output this shift:  No intake/output data recorded.     Medications:  Current Facility-Administered Medications   Medication Dose Route Frequency Provider Last Rate Last Admin   • sodium chloride 0.9 % flush bag 25 mL  25 mL Intravenous PRN Indio Porras MD       • Potassium Standard Replacement Protocol   Does not apply See Admin Instructions Indio Porras MD       • apixaBAN (ELIQUIS) tablet 2.5 mg  2.5 mg Oral 2 times per day Albert Milligan MD   2.5 mg at 04/15/21 2003   • metoPROLOL tartrate (LOPRESSOR) tablet 50 mg  50 mg Oral QHS Albert Milligan MD   50 mg at 04/15/21 2003   • metoPROLOL tartrate (LOPRESSOR) tablet 25 mg  25 mg Oral Daily Albert Milligan MD       • ipratropium (ATROVENT) 0.02 % nebulizer solution 0.5 mg  0.5 mg Nebulization 4x Daily Resp PRN Paula Vaca MD       • torsemide (DEMADEX) tablet 20 mg  20 mg Oral Daily Indio Porras MD   20 mg at 04/15/21 0920   • pravastatin (PRAVACHOL) tablet 20 mg  20 mg Oral Daily Indio Porras MD   20 mg at 04/15/21 0920   • citalopram (CeleXA) tablet 20 mg  20 mg Oral Daily Indio Porras MD   20 mg at 04/15/21 0920   • dextrose 50 % injection 25 g  25 g Intravenous PRN Indio Porras MD       • dextrose 50 % injection 12.5 g  12.5 g Intravenous PRN Indio Porras MD       • glucagon (GLUCAGEN) injection 1 mg  1 mg Intramuscular PRN Indio Porras MD       • dextrose (GLUTOSE) 40 % gel 15 g  15 g Oral PRN Indio Porras MD       • dextrose (GLUTOSE) 40 % gel 30 g  30 g Oral PRN Indio Porras MD       • insulin lispro (ADMELOG,HumaLOG) scheduled AND correction dose   Subcutaneous TID WC Indio Porras MD       • sodium chloride 0.9 % flush bag 25 mL  25 mL Intravenous PRN Indio Porras MD       • sodium chloride (PF) 0.9 % injection 2 mL  2 mL Intracatheter 2 times per  day Indio Porras MD   2 mL at 04/15/21 2004       Results:  Recent Results (from the past 24 hour(s))   Basic Metabolic Panel    Collection Time: 04/15/21  6:20 AM   Result Value Ref Range    Fasting Status      Sodium 140 135 - 145 mmol/L    Potassium 3.7 3.4 - 5.1 mmol/L    Chloride 102 98 - 107 mmol/L    Carbon Dioxide 34 (H) 21 - 32 mmol/L    Anion Gap 8 (L) 10 - 20 mmol/L    Glucose 96 65 - 99 mg/dL    BUN 41 (H) 6 - 20 mg/dL    Creatinine 1.45 (H) 0.51 - 0.95 mg/dL    Glomerular Filtration Rate 32 (L) >90 mL/min/1.73m2    BUN/ Creatinine Ratio 28 (H) 7 - 25    Calcium 8.9 8.4 - 10.2 mg/dL   Lipid Panel Without Reflex    Collection Time: 04/15/21  6:20 AM   Result Value Ref Range    Fasting Status      Cholesterol 116 <=199 mg/dL    Triglycerides 82 <=149 mg/dL    HDL 56 >=50 mg/dL    LDL 44 <=129 mg/dL    Non-HDL Cholesterol 60 mg/dL    Cholesterol/ HDL Ratio 2.1 <=4.4   CBC with Automated Differential (performable only)    Collection Time: 04/15/21  6:20 AM   Result Value Ref Range    WBC 8.0 4.2 - 11.0 K/mcL    RBC 3.35 (L) 4.00 - 5.20 mil/mcL    HGB 10.1 (L) 12.0 - 15.5 g/dL    HCT 32.2 (L) 36.0 - 46.5 %    MCV 96.1 78.0 - 100.0 fl    MCH 30.1 26.0 - 34.0 pg    MCHC 31.4 (L) 32.0 - 36.5 g/dL    RDW-CV 13.9 11.0 - 15.0 %    RDW-SD 49.3 39.0 - 50.0 fL     (L) 140 - 450 K/mcL    NRBC 0 <=0 /100 WBC    Neutrophil, Percent 64 %    Lymphocytes, Percent 23 %    Mono, Percent 10 %    Eosinophils, Percent 3 %    Basophils, Percent 0 %    Immature Granulocytes 0 %    Absolute Neutrophils 5.1 1.8 - 7.7 K/mcL    Absolute Lymphocytes 1.9 1.0 - 4.0 K/mcL    Absolute Monocytes 0.8 0.3 - 0.9 K/mcL    Absolute Eosinophils  0.3 0.0 - 0.5 K/mcL    Absolute Basophils 0.0 0.0 - 0.3 K/mcL    Absolute Immmature Granulocytes 0.0 0.0 - 0.2 K/mcL   D Dimer, Quantitative    Collection Time: 04/15/21  6:20 AM   Result Value Ref Range    D Dimer, Quantitative 0.38 <0.57 mg/L (FEU)   GLUCOSE, BEDSIDE - POINT OF CARE     Collection Time: 04/15/21  7:55 AM   Result Value Ref Range    GLUCOSE, BEDSIDE - POINT OF CARE 98 70 - 99 mg/dL   GLUCOSE, BEDSIDE - POINT OF CARE    Collection Time: 04/15/21 11:47 AM   Result Value Ref Range    GLUCOSE, BEDSIDE - POINT OF CARE 107 (H) 70 - 99 mg/dL   GLUCOSE, BEDSIDE - POINT OF CARE    Collection Time: 04/15/21  4:22 PM   Result Value Ref Range    GLUCOSE, BEDSIDE - POINT OF CARE 105 (H) 70 - 99 mg/dL       Imaging:  Reviewed    Paula Vaca MD  Pulmonary/Critical Care/Sleep Medicine  4/16/2021                             Patient/Caregiver requests family/friend to interpret.

## 2022-04-24 NOTE — ED PROVIDER NOTE - PATIENT PORTAL LINK FT
You can access the FollowMyHealth Patient Portal offered by [facility] by registering at the following website: [email or web address]. By joining [employer]’s FollowMyHealth portal, you will also be able to view your health information using other applications (apps) compatible with our system. You can access the FollowMyHealth Patient Portal offered by [facility] by registering at the following website: [email or web address]. By joining [employer]’s FollowMyHealth portal, you will also be able to view your health information using other applications (apps) compatible with our system.

## 2022-04-24 NOTE — ED PROVIDER NOTE - NS ED ATTENDING STATEMENT MOD
This was a shared visit with the CARL. I reviewed and verified the documentation and independently performed the documented:

## 2022-04-24 NOTE — ED PROVIDER NOTE - CLINICAL SUMMARY MEDICAL DECISION MAKING FREE TEXT BOX
42F with pmh ureteral stricture, hydro of L kidney bib  today for diffuse lower and mid abd pain since yesterday.    +lower abd tenderness. Pelvic exam without any adnexal tenderness.     Plan:  - cbc cmp lipase  - zofran, morphine  - ctap  - ucg

## 2022-04-24 NOTE — ED PROVIDER NOTE - NSFOLLOWUPINSTRUCTIONS_ED_ALL_ED_FT
Usted fue visto en la tanner de emergencias por dolor abdominal. Tiene múltiples quistes en el área de la pelvis. Por favor, arnold un seguimiento con el ginecólogo. Usted fue visto en la tanner de emergencias por dolor abdominal. Tiene múltiples quistes en el área de la pelvis. Por favor, arnold un seguimiento con el ginecólogo.    [facility]  Ambulatory Care Unit  Oncology [facility], Level C  [address] [place] [address]  [phone]

## 2022-04-24 NOTE — ED PROVIDER NOTE - OBJECTIVE STATEMENT
42F with pmh ureteral stricture, hydro of L kidney bib  today for diffuse lower and mid abd pain since yesterday. Pain is non radiating, constant, not post prandial. No suspicious food intake. +nausea and 1 episode of nbnb emesis this am. Currently w/ nausea. No prior hx of similar abd pain. Denies any urinary symptoms, f/c, cp sob, pelvic pain. LMP this month. 42F with pmh ureteral stricture, hydro of L kidney bib  today for diffuse lower and mid abd pain since yesterday. Pain is non radiating, constant, not post prandial. No suspicious food intake. +nausea and 1 episode of nbnb emesis this am. Currently w/ nausea. No prior hx of similar abd pain. Denies any urinary symptoms, f/c, cp sob, pelvic pain. LMP this month.     [id number]

## 2022-04-24 NOTE — ED ADULT TRIAGE NOTE - CHIEF COMPLAINT QUOTE
Pt c/o abd pain beginning today associated with n/v. Pt denies fever,  symptoms. PMHx: hydronephrosis, ureteral stricture.

## 2022-04-24 NOTE — ED PROVIDER NOTE - ATTENDING APP SHARED VISIT CONTRIBUTION OF CARE
41 yo m past medical history left ureterohydronephrosis (unclear cause, no stone or uti identified) presents with diffuse mid/low abd pain since yesterday. did have n/ +nbnb vomiting at onset this AM. 41 yo m past medical history left ureterohydronephrosis (unclear cause, no stone or uti identified) presents with diffuse mid/low abd pain since yesterday. did have n/ +nbnb vomiting at onset this AM. denies fever chills, cp, sob, back pain, diarrhea, vag bleeding or discharged. exam as above. Ddx includes, but not limited to,, uti, diverticulitis, appendicitis. plan: labs, symptom relief prn, ct, reassess

## 2022-04-24 NOTE — ED PROVIDER NOTE - CARE PLAN
1 Principal Discharge DX:	Abdominal pain   Principal Discharge DX:	Abdominal pain  Secondary Diagnosis:	Endometriosis

## 2022-04-24 NOTE — CONSULT NOTE ADULT - ASSESSMENT
43yo G0 LMP 2 weeks ago with lsc dx endometriosis and hx of recurrent ovarian cysts (endometriomas) presenting to ED with new onset diffuse pelvic pain. VSS, patient afebrile. Physical exam with diffuse lower abdominal pain, no rebound or guarding, bilateral adnexal fullness without distinct palpable mass. Labs significant for mild leukocytosis, otherwise unremarkable. US demonstrating bilateral cystic masses on ovaries, hydrosalpinx, and loculated free fluid in pelvis. Imaging findings consistent with past imaging and with diagnosis of endometriosis. Pain improved in ED following 2mg morphine. At time of eval, patient with no additional pain meds x5 hours. Operative report from past laparoscopy reviewed and notable for significant pelvic adhesions, in this setting torsion unlikely. Furthermore, diffuse pelvic tenderness more consistent with cyst rupture in setting of ovulation vs endometriosis.     - no acute gyn intervention   - would continue pain management with motrin, tylenol   - patient instructed to continue 600mg motrin q6h until pain resolves  - discussed with patient that treatment with OCPs to decrease endometriosis would be recommended dependent on reproductive goals at this time (ie continued trying to conceive)   - patient to follow up in [facility] clinic for continue management, recommend appt in next 1-2 weeks   [facility]  Ambulatory Care Unit  Oncology Building, Level C  [address] [place] [address]  [phone]     Michelle Shore MD PGY2   d/w Dr. Dee

## 2022-04-24 NOTE — CONSULT NOTE ADULT - SUBJECTIVE AND OBJECTIVE BOX
JAMIE OLIVEIRA  42y  Female [id number]    HPI:  43yo G0 LMP 2 weeks ago with  hx of endometriosis, recurrent bilateral endometriomas (s/p lsc L ovarian cystectomy in 2018) presenting to ED with new onset pelvic pain. Patient states that pain began acutely this AM and was 10/10 in severity, prompting her to come to the ED. She did not take anything at home for the pain. She had some nausea and one episode of emesis associated with pain. Denies fevers, chills, CP, SOB, constipation, diarrhea, abnormal vaginal bleeding, vaginal discharge. Patient has been followed in [facility] clinic for recurrent cysts and endometriosis, at time of last appointment declined treatment of endo with OCPs because she has been trying to conceive.     Name of GYN Physician: [facility]     POB: G0   Pgyn: +bilateral ovarian cysts, most recent imaging 2021 with 2.5cm R ovarian cyst and 3cm L ovarian cyst. Endometriosis dx on [facility] 2019 at which time she also had removal of L ovarian endometrioma. Per operative report, significant pelvic adhesions in setting of endometriosis. Denies fibroids, STI's, Abnormal pap smears   PMHX: Ureteral stricture   PSHx: Lsc ovarian cystectomy (see above), ureteral stent placement and cysto   Meds: None   All: NKDA   Social History:  Denies smoking use, drug use, alcohol use.      Vital Signs Last 24 Hrs  T(C): 36.8 (2022 08:17), Max: 36.8 (2022 08:17)  T(F): 98.2 (2022 08:17), Max: 98.2 (2022 08:17)  HR: 66 (2022 08:17) (66 - 66)  BP: 116/64 (2022 08:17) (116/64 - 116/64)  BP(mean): --  RR: 16 (2022 08:17) (16 - 16)  SpO2: 99% (2022 08:17) (99% - 99%)    Physical Exam:   General: sitting comfortably in bed, NAD   CV: RR S1S2 no m/r/g  Lungs: CTA b/l, good air flow b/l   Abd: Soft, diffuse mild tenderness without rebound or guarding, non-distended.    :  No bleeding on pad.  External labia wnl.  Bimanual exam with no cervical motion tenderness, uterus wnl. Diffuse tenderness over bilateral adnexae, uterus, bladder. Adnexal fullness bilaterally without distinct masses.   Speculum Exam: Deferred   Ext: non-tender b/l, no edema     LABS:                              12.2   13.52 )-----------( 262      ( 2022 10:02 )             38.2         139  |  105  |  11  ----------------------------<  99  3.9   |  23  |  0.66    Ca    8.9      2022 10:02    TPro  7.1  /  Alb  4.1  /  TBili  <0.2  /  DBili  x   /  AST  17  /  ALT  19  /  AlkPhos  88      I&O's Detail      Urinalysis Basic - ( 2022 10:02 )    Color: Yellow / Appearance: Clear / S.021 / pH: x  Gluc: x / Ketone: Negative  / Bili: Negative / Urobili: <2 mg/dL   Blood: x / Protein: Trace / Nitrite: Negative   Leuk Esterase: Negative / RBC: 3 /HPF / WBC 1 /HPF   Sq Epi: x / Non Sq Epi: 3 /HPF / Bacteria: Negative        RADIOLOGY & ADDITIONAL STUDIES:  < from: US Transvaginal (22 @ 12:15) >  ACC: [id number] EXAM:  US TRANSVAGINAL                          PROCEDURE DATE:  2022          INTERPRETATION:  CLINICAL INFORMATION: 42-year-old female with pelvic   pain and possible endometriosis    LMP: 2 weeks prior    COMPARISON: Ultrasound 2021. CT scan 2022    TECHNIQUE:  Endovaginal pelvic sonogram only.    FINDINGS:  Uterus: 7.0 cm x 4.5 cm x 4.1 cm. Posterior 1.4 cm fundal leiomyoma  Endometrium: 6 mm. Within normal limits.    Right ovary: 5.9 cm x 3.7 cm x 2.9 cm. Multiple cysts with low-level   internal echoes suggestive of endometriosis, largest 3.8 x 4.4 x 3.2 cm.   Progression since 2021.  Left ovary: 3.3 cm x 2.8 cm x 2.7 cm. Several cysts measuring up to 3 cm   which may also be complex. Loculated left pelvic fluid versus   hydrosalpinx as on prior CT scan.    Fluid: None.    IMPRESSION:  Bilateral complex adnexal masses likely secondary to endometriosis with   question of left hydrosalpinx. Recommend follow-up examination with MRI   of the pelvis        --- End of Report ---            AMANDA LERMA MD; Attending Radiologist  This document has been electronically signed. 2022  1:16PM    < end of copied text >

## 2022-04-24 NOTE — ED PROVIDER NOTE - PHYSICAL EXAMINATION
constitutional: well appearing no acute distress, sitting comfortably   HEENT: head- normocephalic, atraumatic               eyes- no scleral icterus  Cardiac: regular rate and rhythm, normal S1 S2 no murmurs rubs or gallops  Respiratory: able to speak in full sentences, no wheezing rales or rhonchi, lungs CTA b/l   Abd: Soft , +RLQ, LLQ tenderness, non distended, no guarding, no palpable hernias or masses   :- suprapubic tenderness, no adnexal tenderness b/l, cervical os closed, normovaginal discharge, no bleeding. no cmt  Msk: no LE edema, no tenderness b/l   Neuro: A x O x 4

## 2022-04-25 ENCOUNTER — NON-APPOINTMENT (OUTPATIENT)
Age: 42
End: 2022-04-25

## 2022-04-27 ENCOUNTER — APPOINTMENT (OUTPATIENT)
Dept: OBGYN | Facility: HOSPITAL | Age: 42
End: 2022-04-27
Payer: COMMERCIAL

## 2022-04-27 ENCOUNTER — OUTPATIENT (OUTPATIENT)
Dept: OUTPATIENT SERVICES | Facility: HOSPITAL | Age: 42
LOS: 1 days | End: 2022-04-27

## 2022-04-27 ENCOUNTER — RESULT REVIEW (OUTPATIENT)
Age: 42
End: 2022-04-27

## 2022-04-27 VITALS
WEIGHT: 186 LBS | HEIGHT: 61.02 IN | BODY MASS INDEX: 35.12 KG/M2 | HEART RATE: 66 BPM | DIASTOLIC BLOOD PRESSURE: 67 MMHG | SYSTOLIC BLOOD PRESSURE: 103 MMHG | TEMPERATURE: 97.4 F

## 2022-04-27 DIAGNOSIS — N13.5 CROSSING VESSEL AND STRICTURE OF URETER WITHOUT HYDRONEPHROSIS: Chronic | ICD-10-CM

## 2022-04-27 DIAGNOSIS — Z87.448 PERSONAL HISTORY OF OTHER DISEASES OF URINARY SYSTEM: Chronic | ICD-10-CM

## 2022-04-27 DIAGNOSIS — N13.30 UNSPECIFIED HYDRONEPHROSIS: Chronic | ICD-10-CM

## 2022-04-27 PROCEDURE — 99213 OFFICE O/P EST LOW 20 MIN: CPT | Mod: GC

## 2022-04-28 DIAGNOSIS — N83.202 UNSPECIFIED OVARIAN CYST, LEFT SIDE: ICD-10-CM

## 2022-04-28 DIAGNOSIS — R10.9 UNSPECIFIED ABDOMINAL PAIN: ICD-10-CM

## 2022-04-28 LAB — HPV HIGH+LOW RISK DNA PNL CVX: SIGNIFICANT CHANGE UP

## 2022-04-29 DIAGNOSIS — N80.9 ENDOMETRIOSIS, UNSPECIFIED: ICD-10-CM

## 2022-04-30 LAB — CYTOLOGY SPEC DOC CYTO: SIGNIFICANT CHANGE UP

## 2022-05-04 NOTE — PLAN
[FreeTextEntry1] : 41 yo P0 w/ hx of endometriosis w/ recent ED visit due to pelvic pain assumed to be related to endometriosis.  Pain likely an acute flare or due to leakage of fluid from cyst as pain has overall been well managed without frequent visits to clinic or ED.  New findings on repeat imaging concerning for hydrosalpinx not previously seen.  Coincidingly patient also experiencing infertility with attempt to spontaneously conceive for several years without success.\par \par We discussed the role of surgery in relation to her endometriosis and infertility.  Patient has not previously tried medical management for endometriosis outside of the setting of NSAIDs.  We discussed hormonal options including OCPs, GnRH agonists, and IUDs.  She is not interested in hormonal manipulation as she desires pregnancy.  We also discussed the role of surgery on the basis of her pain symptoms.  Given that pain overall is manageable, at this time surgical intervention is not warranted.  However, if pain persists, changes in natures, or becomes worse she may sooner become a surgical candidate.  We discussed evaluation by reproductive endocrinology prior to initiating any type of surgery for a better assessment and determination of extent of surgery necessary.  \par \par Plan as follows:\par \par 1. Endometriosis\par -c/w NSAIDs for pain management\par -patient offered OCPs while she initiates consultation w/ TABATHA- declined\par -patient counseled to return to clinic sooner if pain worsening or progressing\par \par 2. Infertility\par -most recent TVUS with concern for hydrosalpinx- referral for MRI given to better evaluate if hydrosalpinx is present to determine if repeat HSG is needed or if salpingectomy should be included in surgical considerations\par -patient plans to continue to attempt spontaneous conception\par -patient to return on cycle day 2-3 for repeat AMH, FSH, LH, E2\par -last HSG from 2019 wnl\par -obtain outside semen analysis results\par -TABATHA consultation\par -reviewed with patient that she has multiple factors that make conception more difficult including age, endometriosis, low AMH, and deficits seen on semen analysis.  Patient informed that her AMH is low and that may not be a candidate for reproductive technology outside of gestational carrier with such a value however TABATHA eval is warranted\par \par 3. Health Maintenance\par -last pap from 2019 NILM, repeat pap w/ HPV performed today\par -f/u results\par \par Pt seen w/ Dr. Diaz\par MGroopa PGY3

## 2022-05-04 NOTE — PHYSICAL EXAM
[Chaperone Present] : A chaperone was present in the examining room during all aspects of the physical examination [Appropriately responsive] : appropriately responsive [Soft] : soft [Non-tender] : non-tender [No Mass] : no mass [Oriented x3] : oriented x3 [Scant] : There was scant vaginal bleeding [Normal] : normal [FreeTextEntry6] : fullness in pelvis without discrete mass

## 2022-05-06 NOTE — ED PROVIDER NOTE - NEURO NEGATIVE STATEMENT, MLM
no loss of consciousness, no gait abnormality, no headache, no sensory deficits, and no weakness.
Patient

## 2022-05-16 ENCOUNTER — RESULT REVIEW (OUTPATIENT)
Age: 42
End: 2022-05-16

## 2022-05-16 LAB
ESTRADIOL FREE SERPL-MCNC: 15 PG/ML — SIGNIFICANT CHANGE UP
FSH SERPL-MCNC: 25.5 IU/L — SIGNIFICANT CHANGE UP
LH SERPL-ACNC: 15.3 IU/L — SIGNIFICANT CHANGE UP
PROGEST SERPL-MCNC: 0.2 NG/ML — SIGNIFICANT CHANGE UP

## 2022-05-20 LAB — ANTI-MULLERIAN HORMONE: 0.02 NG/ML — SIGNIFICANT CHANGE UP

## 2022-05-23 ENCOUNTER — OUTPATIENT (OUTPATIENT)
Dept: OUTPATIENT SERVICES | Facility: HOSPITAL | Age: 42
LOS: 1 days | End: 2022-05-23
Payer: SELF-PAY

## 2022-05-23 ENCOUNTER — APPOINTMENT (OUTPATIENT)
Dept: MRI IMAGING | Facility: CLINIC | Age: 42
End: 2022-05-23
Payer: COMMERCIAL

## 2022-05-23 DIAGNOSIS — N13.30 UNSPECIFIED HYDRONEPHROSIS: Chronic | ICD-10-CM

## 2022-05-23 DIAGNOSIS — Z87.448 PERSONAL HISTORY OF OTHER DISEASES OF URINARY SYSTEM: Chronic | ICD-10-CM

## 2022-05-23 DIAGNOSIS — N97.9 FEMALE INFERTILITY, UNSPECIFIED: ICD-10-CM

## 2022-05-23 DIAGNOSIS — N13.5 CROSSING VESSEL AND STRICTURE OF URETER WITHOUT HYDRONEPHROSIS: Chronic | ICD-10-CM

## 2022-05-23 PROCEDURE — 72197 MRI PELVIS W/O & W/DYE: CPT | Mod: 26

## 2022-05-23 PROCEDURE — A9585: CPT

## 2022-05-23 PROCEDURE — 72197 MRI PELVIS W/O & W/DYE: CPT

## 2022-06-29 ENCOUNTER — APPOINTMENT (OUTPATIENT)
Dept: OBGYN | Facility: HOSPITAL | Age: 42
End: 2022-06-29

## 2022-06-29 ENCOUNTER — OUTPATIENT (OUTPATIENT)
Dept: OUTPATIENT SERVICES | Facility: HOSPITAL | Age: 42
LOS: 1 days | End: 2022-06-29

## 2022-06-29 VITALS
TEMPERATURE: 97.9 F | BODY MASS INDEX: 35.12 KG/M2 | WEIGHT: 186 LBS | DIASTOLIC BLOOD PRESSURE: 53 MMHG | HEIGHT: 61 IN | SYSTOLIC BLOOD PRESSURE: 114 MMHG | HEART RATE: 65 BPM

## 2022-06-29 DIAGNOSIS — Z87.448 PERSONAL HISTORY OF OTHER DISEASES OF URINARY SYSTEM: Chronic | ICD-10-CM

## 2022-06-29 DIAGNOSIS — N13.30 UNSPECIFIED HYDRONEPHROSIS: Chronic | ICD-10-CM

## 2022-06-29 DIAGNOSIS — N13.5 CROSSING VESSEL AND STRICTURE OF URETER WITHOUT HYDRONEPHROSIS: Chronic | ICD-10-CM

## 2022-06-29 PROCEDURE — 99213 OFFICE O/P EST LOW 20 MIN: CPT | Mod: GC

## 2022-06-29 NOTE — PHYSICAL EXAM
[Appropriately responsive] : appropriately responsive [Alert] : alert [No Acute Distress] : no acute distress [Regular Rate Rhythm] : regular rate rhythm [Clear to Auscultation B/L] : clear to auscultation bilaterally [Soft] : soft [Non-tender] : non-tender [Non-distended] : non-distended

## 2022-06-30 DIAGNOSIS — N80.9 ENDOMETRIOSIS, UNSPECIFIED: ICD-10-CM

## 2022-06-30 DIAGNOSIS — N97.9 FEMALE INFERTILITY, UNSPECIFIED: ICD-10-CM

## 2022-07-27 ENCOUNTER — APPOINTMENT (OUTPATIENT)
Dept: OBGYN | Facility: HOSPITAL | Age: 42
End: 2022-07-27

## 2022-10-29 NOTE — H&P PST ADULT - NEGATIVE SKIN SYMPTOMS
After evaluating the patient it has been determined they are at risk for postpartum hemorrhage.
no change in size/color of mole/no itching/no dryness/no rash

## 2023-01-28 ENCOUNTER — RESULT REVIEW (OUTPATIENT)
Age: 43
End: 2023-01-28

## 2023-01-28 ENCOUNTER — OUTPATIENT (OUTPATIENT)
Dept: OUTPATIENT SERVICES | Facility: HOSPITAL | Age: 43
LOS: 1 days | End: 2023-01-28
Payer: MEDICAID

## 2023-01-28 ENCOUNTER — APPOINTMENT (OUTPATIENT)
Dept: MAMMOGRAPHY | Facility: CLINIC | Age: 43
End: 2023-01-28
Payer: MEDICAID

## 2023-01-28 DIAGNOSIS — N60.12 DIFFUSE CYSTIC MASTOPATHY OF LEFT BREAST: ICD-10-CM

## 2023-01-28 DIAGNOSIS — N13.30 UNSPECIFIED HYDRONEPHROSIS: Chronic | ICD-10-CM

## 2023-01-28 DIAGNOSIS — N60.11 DIFFUSE CYSTIC MASTOPATHY OF RIGHT BREAST: ICD-10-CM

## 2023-01-28 PROCEDURE — 77067 SCR MAMMO BI INCL CAD: CPT

## 2023-01-28 PROCEDURE — 77067 SCR MAMMO BI INCL CAD: CPT | Mod: 26

## 2023-01-28 PROCEDURE — 77063 BREAST TOMOSYNTHESIS BI: CPT

## 2023-01-28 PROCEDURE — 77063 BREAST TOMOSYNTHESIS BI: CPT | Mod: 26

## 2023-02-04 ENCOUNTER — RESULT REVIEW (OUTPATIENT)
Age: 43
End: 2023-02-04

## 2023-02-04 ENCOUNTER — APPOINTMENT (OUTPATIENT)
Dept: ULTRASOUND IMAGING | Facility: CLINIC | Age: 43
End: 2023-02-04
Payer: MEDICAID

## 2023-02-04 ENCOUNTER — OUTPATIENT (OUTPATIENT)
Dept: OUTPATIENT SERVICES | Facility: HOSPITAL | Age: 43
LOS: 1 days | End: 2023-02-04
Payer: MEDICAID

## 2023-02-04 DIAGNOSIS — N13.5 CROSSING VESSEL AND STRICTURE OF URETER WITHOUT HYDRONEPHROSIS: Chronic | ICD-10-CM

## 2023-02-04 DIAGNOSIS — Z00.8 ENCOUNTER FOR OTHER GENERAL EXAMINATION: ICD-10-CM

## 2023-02-04 DIAGNOSIS — N13.30 UNSPECIFIED HYDRONEPHROSIS: Chronic | ICD-10-CM

## 2023-02-04 DIAGNOSIS — N83.201 UNSPECIFIED OVARIAN CYST, RIGHT SIDE: ICD-10-CM

## 2023-02-04 DIAGNOSIS — Z87.448 PERSONAL HISTORY OF OTHER DISEASES OF URINARY SYSTEM: Chronic | ICD-10-CM

## 2023-02-04 PROCEDURE — 76830 TRANSVAGINAL US NON-OB: CPT

## 2023-02-04 PROCEDURE — 76856 US EXAM PELVIC COMPLETE: CPT | Mod: 26,59

## 2023-02-04 PROCEDURE — 76856 US EXAM PELVIC COMPLETE: CPT

## 2023-02-04 PROCEDURE — 76830 TRANSVAGINAL US NON-OB: CPT | Mod: 26

## 2023-02-05 NOTE — ED PROVIDER NOTE - TOBACCO USE
Pt admitted for feeling sick, tired and hypoglycemia. Pt had missed friday HD session due to feeling sick at home. Pt underwent HD with dramatic improvement in symptoms. Pt instructed to see endocrinologist for further diabetes management as outpt.
Never smoker

## 2023-02-15 ENCOUNTER — RESULT REVIEW (OUTPATIENT)
Age: 43
End: 2023-02-15

## 2023-02-15 ENCOUNTER — OUTPATIENT (OUTPATIENT)
Dept: OUTPATIENT SERVICES | Facility: HOSPITAL | Age: 43
LOS: 1 days | End: 2023-02-15
Payer: MEDICAID

## 2023-02-15 ENCOUNTER — APPOINTMENT (OUTPATIENT)
Dept: ULTRASOUND IMAGING | Facility: CLINIC | Age: 43
End: 2023-02-15
Payer: MEDICAID

## 2023-02-15 DIAGNOSIS — Z00.8 ENCOUNTER FOR OTHER GENERAL EXAMINATION: ICD-10-CM

## 2023-02-15 DIAGNOSIS — N13.5 CROSSING VESSEL AND STRICTURE OF URETER WITHOUT HYDRONEPHROSIS: Chronic | ICD-10-CM

## 2023-02-15 DIAGNOSIS — Z87.448 PERSONAL HISTORY OF OTHER DISEASES OF URINARY SYSTEM: Chronic | ICD-10-CM

## 2023-02-15 DIAGNOSIS — N13.30 UNSPECIFIED HYDRONEPHROSIS: Chronic | ICD-10-CM

## 2023-02-15 PROCEDURE — 76641 ULTRASOUND BREAST COMPLETE: CPT | Mod: 26,50

## 2023-02-15 PROCEDURE — 76641 ULTRASOUND BREAST COMPLETE: CPT

## 2023-03-17 ENCOUNTER — APPOINTMENT (OUTPATIENT)
Dept: HUMAN REPRODUCTION | Facility: CLINIC | Age: 43
End: 2023-03-17

## 2023-03-24 ENCOUNTER — APPOINTMENT (OUTPATIENT)
Dept: HUMAN REPRODUCTION | Facility: CLINIC | Age: 43
End: 2023-03-24

## 2023-04-25 ENCOUNTER — APPOINTMENT (OUTPATIENT)
Dept: OBGYN | Facility: CLINIC | Age: 43
End: 2023-04-25
Payer: MEDICAID

## 2023-04-25 VITALS
HEART RATE: 66 BPM | SYSTOLIC BLOOD PRESSURE: 116 MMHG | DIASTOLIC BLOOD PRESSURE: 77 MMHG | WEIGHT: 186 LBS | BODY MASS INDEX: 35.12 KG/M2 | HEIGHT: 61 IN

## 2023-04-25 PROCEDURE — 99213 OFFICE O/P EST LOW 20 MIN: CPT

## 2023-04-25 NOTE — PLAN
[FreeTextEntry1] : 44y/o presents for consultation regarding bilateral endometriomas. Pt with long history of endometriosis, s/p L ovarian cystectomy in 2019. Pt no longer pursuing pregnancy. \par \par #Endometriosis\par -Sonogram from Feb 2023 reviewed, R endometrioma 5.1cm, left endometrioma 4.0cm \par -Pt now considering surgical removal, [facility], pt to return to clinic vs Dr. Tompkins, pending insurance\par -Torsion precautions discussed with pt and partner\par -Recommended suppressive treatment for endometriosis, OCPs vs [place] after surgery \par -Pt to return for routine GYN care\par \par denisa CABELLO

## 2023-05-18 ENCOUNTER — APPOINTMENT (OUTPATIENT)
Dept: OBGYN | Facility: CLINIC | Age: 43
End: 2023-05-18
Payer: MEDICAID

## 2023-05-18 ENCOUNTER — NON-APPOINTMENT (OUTPATIENT)
Age: 43
End: 2023-05-18

## 2023-05-18 VITALS
SYSTOLIC BLOOD PRESSURE: 95 MMHG | WEIGHT: 187 LBS | BODY MASS INDEX: 35.3 KG/M2 | DIASTOLIC BLOOD PRESSURE: 62 MMHG | HEART RATE: 61 BPM | HEIGHT: 61 IN

## 2023-05-18 PROCEDURE — 99215 OFFICE O/P EST HI 40 MIN: CPT

## 2023-05-20 NOTE — HISTORY OF PRESENT ILLNESS
[FreeTextEntry1] : 44 yo Nigerien speaking P0 here for eval of known endometriosis.\par 11/2019 - surgery.\par No sig pain, but she does have HMB --> anemia (hgb ~8).\par USG 2/2023 - b/l ov cyst ~ 7 cm.\par Possible left hydrosalpinx on previous MRI.\par \par Interested in future fertility, but understands that it may not happen. Previous w/u shows AMH 0.02 and FSH 25.5\par Pt curious about need for surgery vs continuing expectant management.\par Also curious about what to do about the HMB.\par \par H/o left kidney surgery secondary to hydroureter(?). \par From urology notes - 9/18/20: "..history of a left UPJ obstruction s/p pyeloplasty (July 2017), complicated by residual distal ureteral stricture s/p ureteroscopy and biopsy w/ failed stent placement and subsequent NT placement (Feb 2018), s/p left ureteroscopy with fulguration/biopsy and ureteral dilation (3/13/2018)\par with successful stent removal. Concern for persistent obstruction 2/2 left adnexal cyst s/p laparoscopic ovarian cyst removal (11/2019), following up for recent hospitalization for urinary tract infection 9/12-/13. Patient was having several days of left lower back pain and hematuria, afebrile, WBC 10.8, SCr 0.69, Urine culture pan sensitive E coli. CT imaging on admission demonstrated unchanged left moderate hydroureteronephrosis to level of distal left ureter without evidence of obstruction and left adnexal cystic lesion similar to 04/09/2019 suggesting benign ovarian neoplasm."\par \par Pt here w/her .

## 2023-05-20 NOTE — DISCUSSION/SUMMARY
[FreeTextEntry1] : 44 yo Puerto Rican speaking P0 w/known b/l endometriomas w/h/o lsc LO cystectomy 11/29/19.\par Her main issue is HMB - no sig pain.\par Interested in future fertility by ovarian fxn testing not reassuring (AMH 0.02, FSH 25.5).\par Also w/complex urology history including pyeloplasty 7/2017 prior to additional procedures.\par Multiple questions answered.\par Urology history reviewed.\par \par Plan                                                                                                                                                                   \par RTO for EMbx\par Consider plan for ov cysts\par   If surgery indicated, would coordinate w/Urology\par \par Letter to Dr. Aashish Honeycutt

## 2023-06-13 NOTE — PACU DISCHARGE NOTE - MENTAL STATUS: PATIENT PARTICIPATION
Awake Include Z78.9 (Other Specified Conditions Influencing Health Status) As An Associated Diagnosis?: No

## 2023-07-03 ENCOUNTER — APPOINTMENT (OUTPATIENT)
Dept: OBGYN | Facility: CLINIC | Age: 43
End: 2023-07-03
Payer: MEDICAID

## 2023-07-03 VITALS
DIASTOLIC BLOOD PRESSURE: 76 MMHG | WEIGHT: 164 LBS | HEART RATE: 62 BPM | SYSTOLIC BLOOD PRESSURE: 115 MMHG | BODY MASS INDEX: 30.96 KG/M2 | HEIGHT: 61 IN

## 2023-07-03 DIAGNOSIS — N92.0 EXCESSIVE AND FREQUENT MENSTRUATION WITH REGULAR CYCLE: ICD-10-CM

## 2023-07-03 PROCEDURE — 58100 BIOPSY OF UTERUS LINING: CPT | Mod: 53

## 2023-07-03 PROCEDURE — 99212 OFFICE O/P EST SF 10 MIN: CPT | Mod: 25

## 2023-07-03 NOTE — PROCEDURE
[Endometrial Biopsy] : Endometrial biopsy [Irregular Bleeding] : irregular uterine bleeding [Risks] : risks [Infection] : infection [None] : none [Tenaculum] : Tenaculum [de-identified] : Cervical stenosis. Unable to palpate pipelle or Os finder [de-identified] : Unable to pass pipelle. Procedure aborted secondary to pt discomfort.

## 2023-07-03 NOTE — DISCUSSION/SUMMARY
[FreeTextEntry1] : 44 yo P0 w/b/l ov cyst and HMB. Pt here for EMbx but unable to complete procedure secondary to cervical stenosis. Pt not interested in repeat attempt w/cytotec or in OR. \par Pt also not interested in surgical intervention for ov cysts now. States that pain is not significantly affecting her QOL.\par \par Plan\par Repeat USG 9/2023 - to check EMS and ov cyst.\par Will consider intervention at that time.

## 2023-07-03 NOTE — HISTORY OF PRESENT ILLNESS
[FreeTextEntry1] : 44 yo Tajik speaking P0 w/known b/l endometriomas w/h/o lsc LO cystectomy 11/29/19.\par Here today for EM bx. LMP 6/27/23\par \par Her main issue is HMB - no sig pain.\par Interested in future fertility by ovarian fxn testing not reassuring (AMH 0.02, FSH 25.5).\par Also w/complex urology history including pyeloplasty 7/2017 prior to additional procedures.\par Multiple questions answered.\par Urology history reviewed.\par \par Pt does not have significant pain secondary to  154.94

## 2023-07-05 LAB
HCG UR QL: NEGATIVE
QUALITY CONTROL: YES

## 2023-08-24 ENCOUNTER — APPOINTMENT (OUTPATIENT)
Dept: ULTRASOUND IMAGING | Facility: CLINIC | Age: 43
End: 2023-08-24
Payer: MEDICAID

## 2023-08-24 ENCOUNTER — OUTPATIENT (OUTPATIENT)
Dept: OUTPATIENT SERVICES | Facility: HOSPITAL | Age: 43
LOS: 1 days | End: 2023-08-24
Payer: MEDICAID

## 2023-08-24 DIAGNOSIS — N13.30 UNSPECIFIED HYDRONEPHROSIS: Chronic | ICD-10-CM

## 2023-08-24 DIAGNOSIS — Z87.448 PERSONAL HISTORY OF OTHER DISEASES OF URINARY SYSTEM: Chronic | ICD-10-CM

## 2023-08-24 DIAGNOSIS — N13.5 CROSSING VESSEL AND STRICTURE OF URETER WITHOUT HYDRONEPHROSIS: Chronic | ICD-10-CM

## 2023-08-24 DIAGNOSIS — Z00.8 ENCOUNTER FOR OTHER GENERAL EXAMINATION: ICD-10-CM

## 2023-08-24 PROCEDURE — 76830 TRANSVAGINAL US NON-OB: CPT

## 2023-08-24 PROCEDURE — 76830 TRANSVAGINAL US NON-OB: CPT | Mod: 26

## 2023-09-20 NOTE — H&P PST ADULT - ASSESSMENT
40 yo female with bilateral ovarian cysts Rhombic Flap Text: The defect edges were debeveled with a #15 scalpel blade.  Given the location of the defect and the proximity to free margins a rhombic flap was deemed most appropriate.  Using a sterile surgical marker, an appropriate rhombic flap was drawn incorporating the defect.    The area thus outlined was incised deep to adipose tissue with a #15 scalpel blade.  The skin margins were undermined to an appropriate distance in all directions utilizing iris scissors.

## 2023-09-27 ENCOUNTER — APPOINTMENT (OUTPATIENT)
Dept: OBGYN | Facility: CLINIC | Age: 43
End: 2023-09-27
Payer: MEDICAID

## 2023-09-27 PROCEDURE — 99214 OFFICE O/P EST MOD 30 MIN: CPT | Mod: 95

## 2023-10-05 NOTE — PATIENT PROFILE ADULT. - PATIENT/CAREGIVER ACCEPTED INTERPRETER SERVICES
Problem: Pain  Goal: #Acceptable pain level achieved/maintained at rest using NRS/Faces  Description: This goal is used for patients who can self-report.  Acceptable means the level is at or below the identified comfort/function goal.  Outcome: Outcome Not Met, Continue to Monitor  Goal: # Acceptable pain level achieved/maintained at rest using NRS/Faces without oversedation (opioid naive or PCA/Epidural infusion)  Description: This goal is used if Opioid-naïve or on PCA/Epidural Infusion.  Outcome: Outcome Not Met, Continue to Monitor  Goal: # Verbalizes understanding of pain management  Description: Documented in Patient Education Activity  Outcome: Outcome Not Met, Continue to Monitor      yes

## 2023-11-01 NOTE — ED ADULT TRIAGE NOTE - TEMPERATURE IN FAHRENHEIT (DEGREES F)
98
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 Pain located in the mid sternal, described as pressure, radiating to the back, occurred while driving. He was able to pull over, bystander called EMS who gave Aspirin, noted pain resolved after taken it. He had no shortness of breath, diaphoresis, palpitation, nausea, vomiting, abdominal pain, change in bowel/urinary habit, fever, chills, cough, sick contact, recent travel. EKG NSR with ST changes in lead V2-V6. Had elevation of trops overnight 0.17->0.35. Underwent LHC on 11/1/2023.  Cath showed no significant disease.  Patient is medically stable to be discharged home. Holding Lasix till restarted by Dr. Wallace because patient came with hypotension to ER. Patient to be d/c d on Entresto, Toprol, Ranexa. Patient to f/u with Dr Wallace in 1 week

## 2023-11-16 ENCOUNTER — APPOINTMENT (OUTPATIENT)
Dept: OBGYN | Facility: CLINIC | Age: 43
End: 2023-11-16

## 2023-11-20 ENCOUNTER — APPOINTMENT (OUTPATIENT)
Dept: OBGYN | Facility: HOSPITAL | Age: 43
End: 2023-11-20

## 2023-11-21 ENCOUNTER — APPOINTMENT (OUTPATIENT)
Dept: OBGYN | Facility: HOSPITAL | Age: 43
End: 2023-11-21

## 2023-11-22 ENCOUNTER — RESULT REVIEW (OUTPATIENT)
Age: 43
End: 2023-11-22

## 2023-11-22 ENCOUNTER — APPOINTMENT (OUTPATIENT)
Dept: OBGYN | Facility: HOSPITAL | Age: 43
End: 2023-11-22
Payer: MEDICAID

## 2023-11-22 ENCOUNTER — OUTPATIENT (OUTPATIENT)
Dept: OUTPATIENT SERVICES | Facility: HOSPITAL | Age: 43
LOS: 1 days | End: 2023-11-22

## 2023-11-22 VITALS
HEART RATE: 64 BPM | WEIGHT: 193 LBS | HEIGHT: 61 IN | BODY MASS INDEX: 36.44 KG/M2 | DIASTOLIC BLOOD PRESSURE: 67 MMHG | SYSTOLIC BLOOD PRESSURE: 116 MMHG | TEMPERATURE: 98 F

## 2023-11-22 DIAGNOSIS — R30.0 DYSURIA: ICD-10-CM

## 2023-11-22 DIAGNOSIS — Z87.448 PERSONAL HISTORY OF OTHER DISEASES OF URINARY SYSTEM: Chronic | ICD-10-CM

## 2023-11-22 DIAGNOSIS — N13.5 CROSSING VESSEL AND STRICTURE OF URETER WITHOUT HYDRONEPHROSIS: Chronic | ICD-10-CM

## 2023-11-22 DIAGNOSIS — N83.202 UNSPECIFIED OVARIAN CYST, RIGHT SIDE: ICD-10-CM

## 2023-11-22 DIAGNOSIS — N83.201 UNSPECIFIED OVARIAN CYST, RIGHT SIDE: ICD-10-CM

## 2023-11-22 DIAGNOSIS — N13.30 UNSPECIFIED HYDRONEPHROSIS: Chronic | ICD-10-CM

## 2023-11-22 LAB
APPEARANCE UR: CLEAR — SIGNIFICANT CHANGE UP
APPEARANCE UR: CLEAR — SIGNIFICANT CHANGE UP
BILIRUB UR-MCNC: NEGATIVE — SIGNIFICANT CHANGE UP
BILIRUB UR-MCNC: NEGATIVE — SIGNIFICANT CHANGE UP
COLOR SPEC: YELLOW — SIGNIFICANT CHANGE UP
COLOR SPEC: YELLOW — SIGNIFICANT CHANGE UP
DIFF PNL FLD: NEGATIVE — SIGNIFICANT CHANGE UP
DIFF PNL FLD: NEGATIVE — SIGNIFICANT CHANGE UP
GLUCOSE UR QL: NEGATIVE MG/DL — SIGNIFICANT CHANGE UP
GLUCOSE UR QL: NEGATIVE MG/DL — SIGNIFICANT CHANGE UP
KETONES UR-MCNC: NEGATIVE MG/DL — SIGNIFICANT CHANGE UP
KETONES UR-MCNC: NEGATIVE MG/DL — SIGNIFICANT CHANGE UP
LEUKOCYTE ESTERASE UR-ACNC: NEGATIVE — SIGNIFICANT CHANGE UP
LEUKOCYTE ESTERASE UR-ACNC: NEGATIVE — SIGNIFICANT CHANGE UP
NITRITE UR-MCNC: NEGATIVE — SIGNIFICANT CHANGE UP
NITRITE UR-MCNC: NEGATIVE — SIGNIFICANT CHANGE UP
PH UR: 6 — SIGNIFICANT CHANGE UP (ref 5–8)
PH UR: 6 — SIGNIFICANT CHANGE UP (ref 5–8)
PROT UR-MCNC: NEGATIVE MG/DL — SIGNIFICANT CHANGE UP
PROT UR-MCNC: NEGATIVE MG/DL — SIGNIFICANT CHANGE UP
SP GR SPEC: 1.01 — SIGNIFICANT CHANGE UP (ref 1–1.03)
SP GR SPEC: 1.01 — SIGNIFICANT CHANGE UP (ref 1–1.03)
UROBILINOGEN FLD QL: 0.2 MG/DL — SIGNIFICANT CHANGE UP (ref 0.2–1)
UROBILINOGEN FLD QL: 0.2 MG/DL — SIGNIFICANT CHANGE UP (ref 0.2–1)

## 2023-11-22 PROCEDURE — 99214 OFFICE O/P EST MOD 30 MIN: CPT | Mod: GC

## 2023-11-22 RX ORDER — SULFAMETHOXAZOLE AND TRIMETHOPRIM 800; 160 MG/1; MG/1
800-160 TABLET ORAL DAILY
Qty: 3 | Refills: 0 | Status: ACTIVE | COMMUNITY
Start: 2023-11-22 | End: 1900-01-01

## 2023-11-23 LAB
CULTURE RESULTS: SIGNIFICANT CHANGE UP
CULTURE RESULTS: SIGNIFICANT CHANGE UP
SPECIMEN SOURCE: SIGNIFICANT CHANGE UP
SPECIMEN SOURCE: SIGNIFICANT CHANGE UP

## 2023-11-28 DIAGNOSIS — R30.0 DYSURIA: ICD-10-CM

## 2023-11-28 DIAGNOSIS — N80.9 ENDOMETRIOSIS, UNSPECIFIED: ICD-10-CM

## 2023-11-28 DIAGNOSIS — N83.201 UNSPECIFIED OVARIAN CYST, RIGHT SIDE: ICD-10-CM

## 2023-12-08 ENCOUNTER — OUTPATIENT (OUTPATIENT)
Dept: OUTPATIENT SERVICES | Facility: HOSPITAL | Age: 43
LOS: 1 days | End: 2023-12-08

## 2023-12-08 VITALS
TEMPERATURE: 98 F | SYSTOLIC BLOOD PRESSURE: 97 MMHG | HEIGHT: 62.5 IN | RESPIRATION RATE: 16 BRPM | WEIGHT: 184.09 LBS | DIASTOLIC BLOOD PRESSURE: 65 MMHG | OXYGEN SATURATION: 98 % | HEART RATE: 67 BPM

## 2023-12-08 DIAGNOSIS — Z87.448 PERSONAL HISTORY OF OTHER DISEASES OF URINARY SYSTEM: Chronic | ICD-10-CM

## 2023-12-08 DIAGNOSIS — Z98.890 OTHER SPECIFIED POSTPROCEDURAL STATES: Chronic | ICD-10-CM

## 2023-12-08 DIAGNOSIS — N13.30 UNSPECIFIED HYDRONEPHROSIS: Chronic | ICD-10-CM

## 2023-12-08 DIAGNOSIS — N80.00 ENDOMETRIOSIS OF THE UTERUS, UNSPECIFIED: ICD-10-CM

## 2023-12-08 DIAGNOSIS — N80.9 ENDOMETRIOSIS, UNSPECIFIED: ICD-10-CM

## 2023-12-08 DIAGNOSIS — N13.5 CROSSING VESSEL AND STRICTURE OF URETER WITHOUT HYDRONEPHROSIS: Chronic | ICD-10-CM

## 2023-12-08 LAB
A1C WITH ESTIMATED AVERAGE GLUCOSE RESULT: 5.2 % — SIGNIFICANT CHANGE UP (ref 4–5.6)
A1C WITH ESTIMATED AVERAGE GLUCOSE RESULT: 5.2 % — SIGNIFICANT CHANGE UP (ref 4–5.6)
ANION GAP SERPL CALC-SCNC: 10 MMOL/L — SIGNIFICANT CHANGE UP (ref 7–14)
ANION GAP SERPL CALC-SCNC: 10 MMOL/L — SIGNIFICANT CHANGE UP (ref 7–14)
BLD GP AB SCN SERPL QL: NEGATIVE — SIGNIFICANT CHANGE UP
BLD GP AB SCN SERPL QL: NEGATIVE — SIGNIFICANT CHANGE UP
BUN SERPL-MCNC: 13 MG/DL — SIGNIFICANT CHANGE UP (ref 7–23)
BUN SERPL-MCNC: 13 MG/DL — SIGNIFICANT CHANGE UP (ref 7–23)
CALCIUM SERPL-MCNC: 8.5 MG/DL — SIGNIFICANT CHANGE UP (ref 8.4–10.5)
CALCIUM SERPL-MCNC: 8.5 MG/DL — SIGNIFICANT CHANGE UP (ref 8.4–10.5)
CHLORIDE SERPL-SCNC: 106 MMOL/L — SIGNIFICANT CHANGE UP (ref 98–107)
CHLORIDE SERPL-SCNC: 106 MMOL/L — SIGNIFICANT CHANGE UP (ref 98–107)
CO2 SERPL-SCNC: 25 MMOL/L — SIGNIFICANT CHANGE UP (ref 22–31)
CO2 SERPL-SCNC: 25 MMOL/L — SIGNIFICANT CHANGE UP (ref 22–31)
CREAT SERPL-MCNC: 0.61 MG/DL — SIGNIFICANT CHANGE UP (ref 0.5–1.3)
CREAT SERPL-MCNC: 0.61 MG/DL — SIGNIFICANT CHANGE UP (ref 0.5–1.3)
EGFR: 115 ML/MIN/1.73M2 — SIGNIFICANT CHANGE UP
EGFR: 115 ML/MIN/1.73M2 — SIGNIFICANT CHANGE UP
ESTIMATED AVERAGE GLUCOSE: 103 — SIGNIFICANT CHANGE UP
ESTIMATED AVERAGE GLUCOSE: 103 — SIGNIFICANT CHANGE UP
GLUCOSE SERPL-MCNC: 109 MG/DL — HIGH (ref 70–99)
GLUCOSE SERPL-MCNC: 109 MG/DL — HIGH (ref 70–99)
HCG SERPL-ACNC: <1 MIU/ML — SIGNIFICANT CHANGE UP
HCG SERPL-ACNC: <1 MIU/ML — SIGNIFICANT CHANGE UP
HCT VFR BLD CALC: 34 % — LOW (ref 34.5–45)
HCT VFR BLD CALC: 34 % — LOW (ref 34.5–45)
HGB BLD-MCNC: 10.8 G/DL — LOW (ref 11.5–15.5)
HGB BLD-MCNC: 10.8 G/DL — LOW (ref 11.5–15.5)
MCHC RBC-ENTMCNC: 26.7 PG — LOW (ref 27–34)
MCHC RBC-ENTMCNC: 26.7 PG — LOW (ref 27–34)
MCHC RBC-ENTMCNC: 31.8 GM/DL — LOW (ref 32–36)
MCHC RBC-ENTMCNC: 31.8 GM/DL — LOW (ref 32–36)
MCV RBC AUTO: 84 FL — SIGNIFICANT CHANGE UP (ref 80–100)
MCV RBC AUTO: 84 FL — SIGNIFICANT CHANGE UP (ref 80–100)
NRBC # BLD: 0 /100 WBCS — SIGNIFICANT CHANGE UP (ref 0–0)
NRBC # BLD: 0 /100 WBCS — SIGNIFICANT CHANGE UP (ref 0–0)
NRBC # FLD: 0 K/UL — SIGNIFICANT CHANGE UP (ref 0–0)
NRBC # FLD: 0 K/UL — SIGNIFICANT CHANGE UP (ref 0–0)
PLATELET # BLD AUTO: 266 K/UL — SIGNIFICANT CHANGE UP (ref 150–400)
PLATELET # BLD AUTO: 266 K/UL — SIGNIFICANT CHANGE UP (ref 150–400)
POTASSIUM SERPL-MCNC: 3.3 MMOL/L — LOW (ref 3.5–5.3)
POTASSIUM SERPL-MCNC: 3.3 MMOL/L — LOW (ref 3.5–5.3)
POTASSIUM SERPL-SCNC: 3.3 MMOL/L — LOW (ref 3.5–5.3)
POTASSIUM SERPL-SCNC: 3.3 MMOL/L — LOW (ref 3.5–5.3)
RBC # BLD: 4.05 M/UL — SIGNIFICANT CHANGE UP (ref 3.8–5.2)
RBC # BLD: 4.05 M/UL — SIGNIFICANT CHANGE UP (ref 3.8–5.2)
RBC # FLD: 15.7 % — HIGH (ref 10.3–14.5)
RBC # FLD: 15.7 % — HIGH (ref 10.3–14.5)
RH IG SCN BLD-IMP: POSITIVE — SIGNIFICANT CHANGE UP
RH IG SCN BLD-IMP: POSITIVE — SIGNIFICANT CHANGE UP
SODIUM SERPL-SCNC: 141 MMOL/L — SIGNIFICANT CHANGE UP (ref 135–145)
SODIUM SERPL-SCNC: 141 MMOL/L — SIGNIFICANT CHANGE UP (ref 135–145)
WBC # BLD: 6.3 K/UL — SIGNIFICANT CHANGE UP (ref 3.8–10.5)
WBC # BLD: 6.3 K/UL — SIGNIFICANT CHANGE UP (ref 3.8–10.5)
WBC # FLD AUTO: 6.3 K/UL — SIGNIFICANT CHANGE UP (ref 3.8–10.5)
WBC # FLD AUTO: 6.3 K/UL — SIGNIFICANT CHANGE UP (ref 3.8–10.5)

## 2023-12-08 RX ORDER — ACETAMINOPHEN 500 MG
2 TABLET ORAL
Qty: 0 | Refills: 0 | DISCHARGE

## 2023-12-08 RX ORDER — SODIUM CHLORIDE 9 MG/ML
3 INJECTION INTRAMUSCULAR; INTRAVENOUS; SUBCUTANEOUS EVERY 8 HOURS
Refills: 0 | Status: DISCONTINUED | OUTPATIENT
Start: 2023-12-14 | End: 2023-12-28

## 2023-12-08 RX ORDER — IBUPROFEN 200 MG
1 TABLET ORAL
Qty: 0 | Refills: 0 | DISCHARGE

## 2023-12-08 RX ORDER — CHLORHEXIDINE GLUCONATE 213 G/1000ML
1 SOLUTION TOPICAL DAILY
Refills: 0 | Status: DISCONTINUED | OUTPATIENT
Start: 2023-12-14 | End: 2023-12-28

## 2023-12-08 NOTE — H&P PST ADULT - MUSCULOSKELETAL
negative ROM intact/no joint swelling/no joint erythema/no joint warmth/no calf tenderness/strength 5/5 bilateral upper extremities/strength 5/5 bilateral lower extremities

## 2023-12-08 NOTE — H&P PST ADULT - GENITOURINARY COMMENTS
Constant bilateral pelvic pain with occasional distention with heavy lifting and banding over at work

## 2023-12-08 NOTE — H&P PST ADULT - NSICDXPASTMEDICALHX_GEN_ALL_CORE_FT
PAST MEDICAL HISTORY:  Bilateral ovarian cysts     Chronic salpingitis     Endometriosis     Hydronephrosis, left     Ovarian cyst     Seasonal allergies     Ureteral stricture (left percutaneous nephrostomy and ureteral stent placed 2/21/18)     PAST MEDICAL HISTORY:  Bilateral ovarian cysts     Chronic salpingitis     Endometriosis     Hydronephrosis, left     Ovarian cyst     Seasonal allergies     Ureteral stricture (left percutaneous nephrostomy and ureteral stent placed 2/21/18)    UTI (urinary tract infection)

## 2023-12-08 NOTE — H&P PST ADULT - PROBLEM SELECTOR PLAN 1
Patient scheduled for surgery on: 12/14/23  Provided with verbal and written presurgical instructions  Verbalized understanding  with teach back on the following: Famotidine for GI prophylaxis and chlorhexidine wash    Lab specimen drawn at PST today: cbc, bmp, hga1c, type, abo  In chart-UA and urine cx Prednisone Counseling:  I discussed with the patient the risks of prolonged use of prednisone including but not limited to weight gain, insomnia, osteoporosis, mood changes, diabetes, susceptibility to infection, glaucoma and high blood pressure.  In cases where prednisone use is prolonged, patients should be monitored with blood pressure checks, serum glucose levels and an eye exam.  Additionally, the patient may need to be placed on GI prophylaxis, PCP prophylaxis, and calcium and vitamin D supplementation and/or a bisphosphonate.  The patient verbalized understanding of the proper use and the possible adverse effects of prednisone.  All of the patient's questions and concerns were addressed.

## 2023-12-08 NOTE — H&P PST ADULT - NSICDXPASTSURGICALHX_GEN_ALL_CORE_FT
PAST SURGICAL HISTORY:  Acquired ureteral stricture     H/O ovarian cystectomy     History of nephrostomy (Left percutaneous nephrostomy and ureteral stent placed 2/21/18)

## 2023-12-08 NOTE — H&P PST ADULT - HISTORY OF PRESENT ILLNESS
43 yr old female presents with history of endometriosis, pelvic pain, menorrhagia, ovarian cyst,  s/p ovarian cystectomy in 2019. Endometrial biopsy unsuccessful due to cervical stenosis, Reports dysuria and hematuria, [place] antibiotics [place]from [place] country- unable to recall dates she took medication; reports resolve of lower urinary tract symptoms) recent urine culture and UA negative- , Scheduled for total laparoscopic hysterectomy, excision of endometriosis, bilateral ovarian cystectomy, bilateral salpingectomy ureteral catheter cystoscopy

## 2023-12-08 NOTE — H&P PST ADULT - ATTENDING COMMENTS
44 yo P0 w/endo, b/l ov cysts, CPP, and endo.  Pt on call to OR for EUA TLH BS cysto, possible Ucath placement, possible oophorectomy

## 2023-12-08 NOTE — H&P PST ADULT - GENERAL GENITOURINARY SYMPTOMS
Patients reports resolve of symptoms, recent UA and urine culture in chart/hematuria/dysuria/increased urinary frequency

## 2023-12-09 LAB
RH IG SCN BLD-IMP: POSITIVE — SIGNIFICANT CHANGE UP
RH IG SCN BLD-IMP: POSITIVE — SIGNIFICANT CHANGE UP

## 2023-12-13 ENCOUNTER — TRANSCRIPTION ENCOUNTER (OUTPATIENT)
Age: 43
End: 2023-12-13

## 2023-12-13 NOTE — ASU PATIENT PROFILE, ADULT - FALL HARM RISK - UNIVERSAL INTERVENTIONS
Bed in lowest position, wheels locked, appropriate side rails in place/Call bell, personal items and telephone in reach/Instruct patient to call for assistance before getting out of bed or chair/Non-slip footwear when patient is out of bed/[place] to call system/Physically safe environment - no spills, clutter or unnecessary equipment/Purposeful Proactive Rounding/Room/bathroom lighting operational, light cord in reach Bed in lowest position, wheels locked, appropriate side rails in place/Call bell, personal items and telephone in reach/Instruct patient to call for assistance before getting out of bed or chair/Non-slip footwear when patient is out of bed/[place] to call system/Physically safe environment - no spills, clutter or unnecessary equipment/Purposeful Proactive Rounding/Room/bathroom lighting operational, light cord in reach

## 2023-12-13 NOTE — ASU PATIENT PROFILE, ADULT - NSICDXPASTMEDICALHX_GEN_ALL_CORE_FT
PAST MEDICAL HISTORY:  Bilateral ovarian cysts     Chronic salpingitis     Endometriosis     Hydronephrosis, left     Ovarian cyst     Seasonal allergies     Ureteral stricture (left percutaneous nephrostomy and ureteral stent placed 2/21/18)    UTI (urinary tract infection)

## 2023-12-14 ENCOUNTER — APPOINTMENT (OUTPATIENT)
Dept: OBGYN | Facility: HOSPITAL | Age: 43
End: 2023-12-14

## 2023-12-14 ENCOUNTER — TRANSCRIPTION ENCOUNTER (OUTPATIENT)
Age: 43
End: 2023-12-14

## 2023-12-14 ENCOUNTER — OUTPATIENT (OUTPATIENT)
Dept: OUTPATIENT SERVICES | Facility: HOSPITAL | Age: 43
LOS: 1 days | Discharge: HOME | End: 2023-12-14
Payer: COMMERCIAL

## 2023-12-14 ENCOUNTER — RESULT REVIEW (OUTPATIENT)
Age: 43
End: 2023-12-14

## 2023-12-14 VITALS
SYSTOLIC BLOOD PRESSURE: 108 MMHG | OXYGEN SATURATION: 100 % | HEIGHT: 62.5 IN | WEIGHT: 184.09 LBS | TEMPERATURE: 99 F | RESPIRATION RATE: 16 BRPM | DIASTOLIC BLOOD PRESSURE: 68 MMHG | HEART RATE: 66 BPM

## 2023-12-14 VITALS
OXYGEN SATURATION: 98 % | SYSTOLIC BLOOD PRESSURE: 107 MMHG | RESPIRATION RATE: 16 BRPM | HEART RATE: 60 BPM | DIASTOLIC BLOOD PRESSURE: 59 MMHG

## 2023-12-14 DIAGNOSIS — N13.5 CROSSING VESSEL AND STRICTURE OF URETER WITHOUT HYDRONEPHROSIS: Chronic | ICD-10-CM

## 2023-12-14 DIAGNOSIS — Z98.890 OTHER SPECIFIED POSTPROCEDURAL STATES: Chronic | ICD-10-CM

## 2023-12-14 DIAGNOSIS — Z87.448 PERSONAL HISTORY OF OTHER DISEASES OF URINARY SYSTEM: Chronic | ICD-10-CM

## 2023-12-14 DIAGNOSIS — N70.11 CHRONIC SALPINGITIS: ICD-10-CM

## 2023-12-14 LAB
HCG UR QL: NEGATIVE — SIGNIFICANT CHANGE UP
HCG UR QL: NEGATIVE — SIGNIFICANT CHANGE UP
POTASSIUM BLDV-SCNC: 4 MMOL/L — SIGNIFICANT CHANGE UP (ref 3.5–5.1)
POTASSIUM BLDV-SCNC: 4 MMOL/L — SIGNIFICANT CHANGE UP (ref 3.5–5.1)

## 2023-12-14 PROCEDURE — 58573 TLH W/T/O UTERUS OVER 250 G: CPT | Mod: 22

## 2023-12-14 PROCEDURE — 88305 TISSUE EXAM BY PATHOLOGIST: CPT | Mod: 26

## 2023-12-14 PROCEDURE — 58662 LAPAROSCOPY EXCISE LESIONS: CPT

## 2023-12-14 PROCEDURE — 88307 TISSUE EXAM BY PATHOLOGIST: CPT | Mod: 26

## 2023-12-14 DEVICE — SURGIFLO MATRIX WITH THROMBIN KIT
Type: IMPLANTABLE DEVICE | Status: NON-FUNCTIONAL
Removed: 2023-12-14

## 2023-12-14 RX ORDER — SODIUM CHLORIDE 9 MG/ML
1000 INJECTION INTRAMUSCULAR; INTRAVENOUS; SUBCUTANEOUS
Refills: 0 | Status: DISCONTINUED | OUTPATIENT
Start: 2023-12-14 | End: 2023-12-28

## 2023-12-14 RX ORDER — SODIUM CHLORIDE 9 MG/ML
1000 INJECTION, SOLUTION INTRAVENOUS
Refills: 0 | Status: DISCONTINUED | OUTPATIENT
Start: 2023-12-14 | End: 2023-12-14

## 2023-12-14 RX ORDER — OXYCODONE HYDROCHLORIDE 5 MG/1
1 TABLET ORAL
Qty: 8 | Refills: 0
Start: 2023-12-14 | End: 2023-12-15

## 2023-12-14 RX ORDER — ACETAMINOPHEN 500 MG
3 TABLET ORAL
Qty: 0 | Refills: 0 | DISCHARGE

## 2023-12-14 RX ORDER — POTASSIUM CHLORIDE 20 MEQ
10 PACKET (EA) ORAL
Refills: 0 | Status: DISCONTINUED | OUTPATIENT
Start: 2023-12-14 | End: 2023-12-14

## 2023-12-14 RX ORDER — ONDANSETRON 8 MG/1
4 TABLET, FILM COATED ORAL ONCE
Refills: 0 | Status: DISCONTINUED | OUTPATIENT
Start: 2023-12-14 | End: 2023-12-28

## 2023-12-14 RX ORDER — ACETAMINOPHEN 500 MG
1000 TABLET ORAL ONCE
Refills: 0 | Status: COMPLETED | OUTPATIENT
Start: 2023-12-14 | End: 2023-12-14

## 2023-12-14 RX ORDER — SODIUM CHLORIDE 9 MG/ML
1000 INJECTION, SOLUTION INTRAVENOUS
Refills: 0 | Status: DISCONTINUED | OUTPATIENT
Start: 2023-12-14 | End: 2023-12-28

## 2023-12-14 RX ORDER — FENTANYL CITRATE 50 UG/ML
50 INJECTION INTRAVENOUS
Refills: 0 | Status: DISCONTINUED | OUTPATIENT
Start: 2023-12-14 | End: 2023-12-14

## 2023-12-14 RX ORDER — GABAPENTIN 400 MG/1
600 CAPSULE ORAL ONCE
Refills: 0 | Status: COMPLETED | OUTPATIENT
Start: 2023-12-14 | End: 2023-12-14

## 2023-12-14 RX ORDER — CELECOXIB 200 MG/1
400 CAPSULE ORAL ONCE
Refills: 0 | Status: COMPLETED | OUTPATIENT
Start: 2023-12-14 | End: 2023-12-14

## 2023-12-14 RX ORDER — HYDROMORPHONE HYDROCHLORIDE 2 MG/ML
0.5 INJECTION INTRAMUSCULAR; INTRAVENOUS; SUBCUTANEOUS ONCE
Refills: 0 | Status: DISCONTINUED | OUTPATIENT
Start: 2023-12-14 | End: 2023-12-14

## 2023-12-14 RX ORDER — PHENAZOPYRIDINE HCL 100 MG
100 TABLET ORAL ONCE
Refills: 0 | Status: DISCONTINUED | OUTPATIENT
Start: 2023-12-14 | End: 2023-12-14

## 2023-12-14 RX ORDER — PHENAZOPYRIDINE HCL 100 MG
200 TABLET ORAL ONCE
Refills: 0 | Status: COMPLETED | OUTPATIENT
Start: 2023-12-14 | End: 2023-12-14

## 2023-12-14 RX ORDER — IBUPROFEN 200 MG
3 TABLET ORAL
Qty: 0 | Refills: 0 | DISCHARGE

## 2023-12-14 RX ADMIN — Medication 200 MILLIGRAM(S): at 10:28

## 2023-12-14 RX ADMIN — GABAPENTIN 600 MILLIGRAM(S): 400 CAPSULE ORAL at 10:18

## 2023-12-14 RX ADMIN — HYDROMORPHONE HYDROCHLORIDE 0.5 MILLIGRAM(S): 2 INJECTION INTRAMUSCULAR; INTRAVENOUS; SUBCUTANEOUS at 19:30

## 2023-12-14 RX ADMIN — CELECOXIB 400 MILLIGRAM(S): 200 CAPSULE ORAL at 10:18

## 2023-12-14 RX ADMIN — SODIUM CHLORIDE 125 MILLILITER(S): 9 INJECTION, SOLUTION INTRAVENOUS at 19:07

## 2023-12-14 RX ADMIN — HYDROMORPHONE HYDROCHLORIDE 0.5 MILLIGRAM(S): 2 INJECTION INTRAMUSCULAR; INTRAVENOUS; SUBCUTANEOUS at 19:07

## 2023-12-14 RX ADMIN — SODIUM CHLORIDE 30 MILLILITER(S): 9 INJECTION, SOLUTION INTRAVENOUS at 10:18

## 2023-12-14 RX ADMIN — Medication 1000 MILLIGRAM(S): at 10:28

## 2023-12-14 RX ADMIN — CHLORHEXIDINE GLUCONATE 1 APPLICATION(S): 213 SOLUTION TOPICAL at 08:37

## 2023-12-14 NOTE — ASU DISCHARGE PLAN (ADULT/PEDIATRIC) - PROVIDER TOKENS
FREE:[LAST:[[facility]],PHONE:[phone]],FAX:[(   )    -],ADDRESS:[Ambulatory Care Unit, Oncology [facility]  [address] [address] [place] [address]],FOLLOWUP:[2 weeks]] FREE:[LAST:[[facility]],PHONE:[phone]],FAX:[(   )    -],ADDRESS:[Ambulatory Care Unit, Oncology [facility]  [address] [address] [place] [address]],FOLLOWUP:[2 weeks]]

## 2023-12-14 NOTE — BRIEF OPERATIVE NOTE - NSICDXBRIEFPOSTOP_GEN_ALL_CORE_FT
POST-OP DIAGNOSIS:  Endometriosis 14-Dec-2023 18:11:44  Marietta Harrell  Abnormal uterine bleeding 14-Dec-2023 18:11:38  Marietta Harrell  Female infertility 14-Dec-2023 18:11:33  Marietta Harrell  Pain pelvic 14-Dec-2023 18:11:28  Marietta Harrell

## 2023-12-14 NOTE — ASU DISCHARGE PLAN (ADULT/PEDIATRIC) - NURSING INSTRUCTIONS
You were given 1000mg IV Tylenol for pain management.  Please DO NOT take any Tylenol containing products, such as  Vicodin, Percocet, Excedrin, many cold preparations for the next 6 hours (until  _10:30__ PM).  DO NOT EXCEED 4000MG OF TYLENOL OVER 24 HOURS.     DO NOT take any Ibuprofen ,Advil or Aleeve until after  __11:30__ PM . You received Toradol during your operation and it can cause damage if too much is taken within a 24 hour time period.

## 2023-12-14 NOTE — ASU DISCHARGE PLAN (ADULT/PEDIATRIC) - NS MD DC FALL RISK RISK
For information on Fall & Injury Prevention, visit: [email or web address]-mobility OR  [email or web address]/[email or web address]-[email or web address]-tips-[email or web address]-avoid-injury OR  https://www.cdc.gov/steadi/patient.html For information on Fall & Injury Prevention, visit: [email or web address]://www.[email or web address]/[email or web address]-prevention-[email or web address]-[email or web address]-health-[email or web address]-mobility OR  https://www.[email or web address]/news/fall-prevention-tips-to-avoid-injury OR  https://www.cdc.gov/steadi/patient.html

## 2023-12-14 NOTE — ASU DISCHARGE PLAN (ADULT/PEDIATRIC) - CARE PROVIDER_API CALL
[facility],   Ambulatory Care Unit, Oncology Basement  [phone] [place] [address]  Phone: [phone]  Fax: (   )    -  Follow Up Time: 2 weeks   [facility],   Ambulatory Care Unit, Oncology Basement  [address] [place] [address]  Phone: [phone]  Fax: (   )    -  Follow Up Time: 2 weeks

## 2023-12-14 NOTE — BRIEF OPERATIVE NOTE - NSICDXBRIEFPROCEDURE_GEN_ALL_CORE_FT
PROCEDURES:  Laparoscopic supracervical hysterectomy with salpingo-oophorectomy and cystoscopy 14-Dec-2023 18:10:24 LSO Marietta Harrell  Right salpingectomy 14-Dec-2023 18:10:36  Marietta Harrell  Laparoscopic right ovarian cystectomy 14-Dec-2023 18:10:45  Marietta Harrell  Laparoscopic lysis of adhesions with salpingo-oophorectomy 14-Dec-2023 18:10:58  Marietta Harrell

## 2023-12-14 NOTE — BRIEF OPERATIVE NOTE - OPERATION/FINDINGS
EUA: grossly normal external genitalia. 10-12w minimally mobile uterus. Adnexa nonpalpable bl. Well healed lsc incisions  Lsc: Entry site atraumatic. Upper abdomen grossly normal. Uterus approx 10w, retroflexed 2/2 adhesive disease to posterior cul de sac and posterior uterus tethered to sigmoid. Bl endometriomas >6cm. Minimal normal ovarian tissue noted. L fallopian tube with hydrosalpinx, dilated and tortuous. Bladder adherent to JAMES and with adhesive band to L round ligament. Appendix grossly normal. No other evidence of peritoneal disease. Bowel otherwise normal.  Cystoscopy: mucosa grossly normal. No lesions noted. Bl UO with vigorous efflux  AAGL Score 471

## 2023-12-14 NOTE — ASU PREOP CHECKLIST - COMMENTS
NAZARIO Smith returning writer's call.    Informed her per Юлия Martinez PA-C: \"If nursing facility is comfortable with removing incisional wound vac, ok to remove vac on 8/5 and then apply dry dressing. Patient should then follow up the following week for wound check and possibly suture removal. If they are unable to remove vac, schedule 8/4.\"    She states they will remove vac on 8/5 and apply dry dressing.  She states wound vac was taken off on admission and put back on. Per NAZARIO Smith states that incision is already closed. Informed her to continue wound vac until 8/5/20.    Patient scheduled to see Dr. Prajapati on 8/11/20 at 9 am.    Office address/suite number provided to her.    She verbalized understanding and appreciative of call.     pepcid taken this am with a sip of water

## 2023-12-14 NOTE — CHART NOTE - NSCHARTNOTEFT_GEN_A_CORE
Patient seen and examined at bedside, recently post-op. No acute complaints at this time. Denies CP, SOB, N/V, fevers, and chills.    Vital Signs Last 24 Hours  T(C): 36.5 (12-14-23 @ 20:15), Max: 37.1 (12-14-23 @ 08:05)  HR: 60 (12-14-23 @ 20:30) (58 - 66)  BP: 107/59 (12-14-23 @ 20:30) (98/68 - 126/67)  RR: 16 (12-14-23 @ 20:30) (12 - 16)  SpO2: 98% (12-14-23 @ 20:30) (94% - 100%)    I&O's Summary    14 Dec 2023 07:01  -  14 Dec 2023 21:12  --------------------------------------------------------  IN: 350 mL / OUT: 350 mL / NET: 0 mL        Physical Exam:  General: NAD  Lungs: breathing comfortably on RA  Abdomen: Soft, appropriately tender, non-distended  Incision: 5 port site incisions CDI  : NO VB on pad  Ext: No pain or swelling    Labs:      MEDICATIONS  (STANDING):  chlorhexidine 2% Cloths 1 Application(s) Topical daily  lactated ringers. 1000 milliLiter(s) (125 mL/Hr) IV Continuous <Continuous>  sodium chloride 0.9% lock flush 3 milliLiter(s) IV Push every 8 hours  sodium chloride 0.9%. 1000 milliLiter(s) (30 mL/Hr) IV Continuous <Continuous>    MEDICATIONS  (PRN):  fentaNYL    Injectable 50 MICROGram(s) IV Push every 5 minutes PRN Severe Pain (7 - 10)  ondansetron Injectable 4 milliGRAM(s) IV Push once PRN Nausea and/or Vomiting      43yyo s/p Lsc OMA, BS, LO, RO cystectomy, SHERLY, cysto recovering well in acute post-operative state.    Neuro: PO pain meds   CV: Hemodynamically stable  Pulm: Encourage oob/ambulation, incentive spirometer at bedside  GI: Regular Diet   : voiding spontaneously   Heme: early ambulation   ID: afebrile  Endo: no active issues  Dispo: continue routine post-op care, pt stable for dc    Caden Lorenzana PGY 2

## 2023-12-14 NOTE — BRIEF OPERATIVE NOTE - NSICDXBRIEFPREOP_GEN_ALL_CORE_FT
PRE-OP DIAGNOSIS:  Pain pelvic 14-Dec-2023 18:11:25  Marietta Harrell  Female infertility 14-Dec-2023 18:11:13  Marietta Harrell  Abnormal uterine bleeding 14-Dec-2023 18:11:21  Marietta Harrell  Endometriosis 14-Dec-2023 18:11:08  Marietta Harrell

## 2023-12-14 NOTE — ASU DISCHARGE PLAN (ADULT/PEDIATRIC) - PROCEDURE
total laparoscopic hysterectomy, bilateral salpingectomy, excision of endometriosis, bilateral ovarian cystectomy, ureteral catheter placement, cystoscopy

## 2023-12-14 NOTE — ASU DISCHARGE PLAN (ADULT/PEDIATRIC) - FOLLOW UP APPOINTMENTS
[phone] May also call Recovery Room (PACU) 24/7 @ [phone]/[facility] May also call Recovery Room (PACU) 24/7 @ [phone]/[facility]

## 2023-12-14 NOTE — BRIEF OPERATIVE NOTE - COMMENTS
Dictation#[id number]  [facility] applied to operative sites Dictation#[id number]  [facility] applied to operative sites

## 2023-12-14 NOTE — ASU DISCHARGE PLAN (ADULT/PEDIATRIC) - ASU DC SPECIAL INSTRUCTIONSFT
Discharge Instructions:  1. Diet: advance as tolerated    2. Activity limited by:   - no running, heavy lifting, strenuous exercise  - nothing in vagina (bath, swim, intercourse, douching, tampons) until cleared by your doctor    3. Medications:   - Senna to prevent constipation (please hold for loose stools)  - Ibuprofen 600mg every 6 hours as needed for pain  - Acetaminophen 975mg every 6 hours as needed for pain  - Oxycodone 5mg every 6 hours for severe pain     4. Follow-up appointment - 2 weeks. Please call the office upon discharge to schedule your appointment if you do not have one already.     5. Precautions:  - Call the office or go to the ED if you have any of the followin) Fever >100.4 that does not resolve  2) Intractable pain  3) Heavy bleeding

## 2023-12-15 LAB
GLUCOSE BLDC GLUCOMTR-MCNC: 83 MG/DL — SIGNIFICANT CHANGE UP (ref 70–99)
GLUCOSE BLDC GLUCOMTR-MCNC: 83 MG/DL — SIGNIFICANT CHANGE UP (ref 70–99)

## 2023-12-15 NOTE — CHART NOTE - NSCHARTNOTEFT_GEN_A_CORE
Called and spoke with patient today. She reports feeling well overall with minimal pain. She picked up the oxycodone prescription but has not needed to use it. She is ambulating, voiding and eating without issue. Patient is aware of her follow up appointment on 1/3/24 at 1:40pm.     ALISSA Menon, PGY3

## 2024-01-03 ENCOUNTER — OUTPATIENT (OUTPATIENT)
Dept: OUTPATIENT SERVICES | Facility: HOSPITAL | Age: 44
LOS: 1 days | End: 2024-01-03

## 2024-01-03 ENCOUNTER — APPOINTMENT (OUTPATIENT)
Dept: OBGYN | Facility: HOSPITAL | Age: 44
End: 2024-01-03
Payer: COMMERCIAL

## 2024-01-03 VITALS
DIASTOLIC BLOOD PRESSURE: 54 MMHG | BODY MASS INDEX: 35.87 KG/M2 | HEART RATE: 66 BPM | SYSTOLIC BLOOD PRESSURE: 97 MMHG | WEIGHT: 190 LBS | HEIGHT: 61 IN | TEMPERATURE: 97.9 F

## 2024-01-03 DIAGNOSIS — Z98.890 OTHER SPECIFIED POSTPROCEDURAL STATES: Chronic | ICD-10-CM

## 2024-01-03 DIAGNOSIS — N80.9 ENDOMETRIOSIS, UNSPECIFIED: ICD-10-CM

## 2024-01-03 DIAGNOSIS — N13.5 CROSSING VESSEL AND STRICTURE OF URETER WITHOUT HYDRONEPHROSIS: Chronic | ICD-10-CM

## 2024-01-03 DIAGNOSIS — N97.9 FEMALE INFERTILITY, UNSPECIFIED: ICD-10-CM

## 2024-01-03 DIAGNOSIS — Z87.448 PERSONAL HISTORY OF OTHER DISEASES OF URINARY SYSTEM: Chronic | ICD-10-CM

## 2024-01-03 PROBLEM — N83.209 UNSPECIFIED OVARIAN CYST, UNSPECIFIED SIDE: Chronic | Status: ACTIVE | Noted: 2023-12-08

## 2024-01-03 PROBLEM — N39.0 URINARY TRACT INFECTION, SITE NOT SPECIFIED: Chronic | Status: ACTIVE | Noted: 2023-12-08

## 2024-01-03 PROBLEM — N70.11 CHRONIC SALPINGITIS: Chronic | Status: ACTIVE | Noted: 2023-12-08

## 2024-01-03 PROCEDURE — 99024 POSTOP FOLLOW-UP VISIT: CPT

## 2024-01-04 LAB
SURGICAL PATHOLOGY STUDY: SIGNIFICANT CHANGE UP
SURGICAL PATHOLOGY STUDY: SIGNIFICANT CHANGE UP

## 2024-01-05 PROBLEM — N80.9 ENDOMETRIOSIS: Status: ACTIVE | Noted: 2021-06-02

## 2024-01-05 PROBLEM — N97.9 FEMALE INFERTILITY: Status: ACTIVE | Noted: 2019-05-29

## 2024-01-07 ENCOUNTER — NON-APPOINTMENT (OUTPATIENT)
Age: 44
End: 2024-01-07

## 2024-01-08 DIAGNOSIS — N97.9 FEMALE INFERTILITY, UNSPECIFIED: ICD-10-CM

## 2024-01-08 DIAGNOSIS — N80.9 ENDOMETRIOSIS, UNSPECIFIED: ICD-10-CM

## 2024-01-21 NOTE — HISTORY OF PRESENT ILLNESS
[Pain is well-controlled] : pain is well-controlled [Clean/Dry/Intact] : clean, dry and intact [Fever] : no fever [Chills] : no chills [Nausea] : no nausea [Diarrhea] : no diarrhea [Vomiting] : no vomiting [Vaginal Bleeding] : no vaginal bleeding [Pelvic Pressure] : no pelvic pressure [Dysuria] : no dysuria [Vaginal Discharge] : no vaginal discharge [Erythema] : not erythematous [Constipation] : no constipation [Swelling] : not swollen [Dehiscence] : not dehisced [de-identified] : Pt is POD#20 s/p LSC OMA, BS, LO, R ovarian cystectomy, lysis of adhesions, cystoscopy for endometriosis.  Pt is meeting all post operative milestones. Reports taking Motrin 1-2 x per day as needed (only takes 1 pill at a time).  [de-identified] : LSC port sites x4 and suprapubic mini laparotomy healing well

## 2024-01-21 NOTE — PLAN
[FreeTextEntry1] : Pt presents for post operative visit s/p LSC, OMA, BS, LO, R cystectomy, SHERLY, cysto. Pt doing well, meeting all post operative milestones. Pathology is pending.  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  Reviewed recommendation for no sexual intercourse 6-8 weeks as well. All questions/concerns addressed.   Seen and d/w Dr Sal S Greenberg[place]Gaviria   [facility]  Patient seen doing very well with no complaints. Port site incisions healing well with no tenderness on palpation. Minimal OTC medication used. Meeting all postop milestones. Encouraged to refrain from any heavy lifting for at least 4-6wks postop. Will review pathology once resulted otherwise patient to follow up for routine GYN care. All questions and concerns addressed. D/W Dr. Leda Sal, FMIGS-2

## 2024-02-28 ENCOUNTER — APPOINTMENT (OUTPATIENT)
Dept: OBGYN | Facility: CLINIC | Age: 44
End: 2024-02-28

## 2024-02-29 NOTE — DISCHARGE NOTE ADULT - LAUNCH MEDICATION RECONCILIATION
Click here for Discharge Medication Review

## 2024-03-12 ENCOUNTER — APPOINTMENT (OUTPATIENT)
Dept: OBGYN | Facility: HOSPITAL | Age: 44
End: 2024-03-12

## 2024-03-28 ENCOUNTER — APPOINTMENT (OUTPATIENT)
Dept: OBGYN | Facility: CLINIC | Age: 44
End: 2024-03-28

## 2024-03-29 ENCOUNTER — NON-APPOINTMENT (OUTPATIENT)
Age: 44
End: 2024-03-29

## 2024-06-10 NOTE — H&P PST ADULT - BMI (KG/M2)
Labs redrawn and sent   
Pt presents to ED with c/o abd pain and emesis. Patient stated she was seen a few days ago and was told she was miscarrying. Patient stated the pain is not getting any better and the medication prescribed gives no relief. Pt denies any cp, sob, or dizziness. Denies any other complaints at this time.   
Pt returned from Ultrasound via stretcher and transport  
Pt to US  
Urine sent to the lab  Blood sent to the lab  
30.8

## 2024-11-26 NOTE — DISCHARGE NOTE ADULT - NSFTFHOMEHTHYNRD_GEN_ALL_CORE
Routine Prenatal Visit     Subjective  Angeline Wynn is a 33 y.o.  at 36w0d here for her routine OB visit.   She is taking her prenatal vitamins.Reports no loss of fluid or vaginal bleeding. Patient doing well.     Pregnancy complicated by:     Patient Active Problem List   Diagnosis    Supervision of other normal pregnancy, antepartum    Obesity in pregnancy, antepartum    Anxiety and depression    H/O  section    Morbid obesity with BMI of 40.0-44.9, adult         OB History    Para Term  AB Living   2 1 1     1   SAB IAB Ectopic Molar Multiple Live Births             1      # Outcome Date GA Lbr Dax/2nd Weight Sex Type Anes PTL Lv   2 Current            1 Term 2020 40w0d   F CS-LTranv   MAYRA           ROS:  General ROS: negative for - chills or fatigue  Genito-Urinary ROS: negative for  change in urinary stream, vaginal discharge     Objective  Physical Exam:   Vitals:    24 0823   BP: 132/85       Uterine Size: size equals dates  FHT: 110-160 BPM     General appearance - alert, well appearing, and in no distress  Abdomen- Soft, Gravid uterus, non-tender to palpation  Extremeties: negative swelling   GBS RV swab performed today    Assessment/Plan:   Diagnoses and all orders for this visit:    1. Supervision of other normal pregnancy, antepartum (Primary)  -     Group B Strep (Molecular) - Swab, Vaginal/Rectum; Future  -     POC Urinalysis Dipstick  -     Group B Strep (Molecular) - Swab, Vaginal/Rectum    2. Obesity in pregnancy, antepartum    3. H/O  section            Counseling:   OB precautions, leaking, VB, marilee andujar vs PTL/Labor  FK  Round Ligament Pain:  The uterus has several ligaments which provide support and keep the uterus in place. As the  uterus grows these ligaments are pulled and stretched which often causes sharp stabbing like pain in the inguinal area.   You may find a pregnancy support band helpful. Changing positions may also help. Yoga is a  great way to cope with round ligament and low back pain in pregnancy.    Massage may also help with low back pain   Things to Consider at this Point in your Pregnancy:  Some women experience swelling in their feet during pregnancy. Compression stockings may help  Drink plenty of water and stay active   Make sure you are eating frequent small meals, nuts are a wonderful snack to keep with you            Return in about 1 week (around 12/10/2024) for Routine OB visit.      We have gone over prenatal care to include the timing and content of visits. I informed her how to contact the office and/or on call person in the event of any problems and encouraged her to do so when she feels it is necessary.  We then spent time answering her questions which she indicated were answered to her satisfaction.    Val Cash,   12/3/2024 08:48 EST   No

## 2024-12-17 NOTE — ASU PREOPERATIVE ASSESSMENT, ADULT ([facility] ONLY) - SMOKING
\"Have you been to the ER, urgent care clinic since your last visit?  Hospitalized since your last visit?\"    NO    “Have you seen or consulted any other health care providers outside of [facility] since your last visit?”    NO            Click Here for Release of Records Request    no

## 2024-12-23 ENCOUNTER — APPOINTMENT (OUTPATIENT)
Dept: ULTRASOUND IMAGING | Facility: CLINIC | Age: 44
End: 2024-12-23
Payer: COMMERCIAL

## 2024-12-23 ENCOUNTER — OUTPATIENT (OUTPATIENT)
Dept: OUTPATIENT SERVICES | Facility: HOSPITAL | Age: 44
LOS: 1 days | End: 2024-12-23
Payer: COMMERCIAL

## 2024-12-23 ENCOUNTER — APPOINTMENT (OUTPATIENT)
Dept: MAMMOGRAPHY | Facility: CLINIC | Age: 44
End: 2024-12-23
Payer: COMMERCIAL

## 2024-12-23 DIAGNOSIS — Z87.448 PERSONAL HISTORY OF OTHER DISEASES OF URINARY SYSTEM: Chronic | ICD-10-CM

## 2024-12-23 DIAGNOSIS — Z98.890 OTHER SPECIFIED POSTPROCEDURAL STATES: Chronic | ICD-10-CM

## 2024-12-23 DIAGNOSIS — N13.5 CROSSING VESSEL AND STRICTURE OF URETER WITHOUT HYDRONEPHROSIS: Chronic | ICD-10-CM

## 2024-12-23 DIAGNOSIS — Z00.00 ENCOUNTER FOR GENERAL ADULT MEDICAL EXAMINATION WITHOUT ABNORMAL FINDINGS: ICD-10-CM

## 2024-12-23 PROCEDURE — 77063 BREAST TOMOSYNTHESIS BI: CPT | Mod: 26

## 2024-12-23 PROCEDURE — 77067 SCR MAMMO BI INCL CAD: CPT | Mod: 26

## 2024-12-23 PROCEDURE — 77067 SCR MAMMO BI INCL CAD: CPT

## 2024-12-23 PROCEDURE — 77063 BREAST TOMOSYNTHESIS BI: CPT

## 2025-01-06 ENCOUNTER — TRANSCRIPTION ENCOUNTER (OUTPATIENT)
Age: 45
End: 2025-01-06

## 2025-01-06 ENCOUNTER — OUTPATIENT (OUTPATIENT)
Dept: OUTPATIENT SERVICES | Facility: HOSPITAL | Age: 45
LOS: 1 days | End: 2025-01-06
Payer: COMMERCIAL

## 2025-01-06 ENCOUNTER — APPOINTMENT (OUTPATIENT)
Dept: ULTRASOUND IMAGING | Facility: CLINIC | Age: 45
End: 2025-01-06
Payer: COMMERCIAL

## 2025-01-06 DIAGNOSIS — Z87.448 PERSONAL HISTORY OF OTHER DISEASES OF URINARY SYSTEM: Chronic | ICD-10-CM

## 2025-01-06 DIAGNOSIS — R92.8 OTHER ABNORMAL AND INCONCLUSIVE FINDINGS ON DIAGNOSTIC IMAGING OF BREAST: ICD-10-CM

## 2025-01-06 DIAGNOSIS — N13.5 CROSSING VESSEL AND STRICTURE OF URETER WITHOUT HYDRONEPHROSIS: Chronic | ICD-10-CM

## 2025-01-06 DIAGNOSIS — Z98.890 OTHER SPECIFIED POSTPROCEDURAL STATES: Chronic | ICD-10-CM

## 2025-01-06 PROCEDURE — 76642 ULTRASOUND BREAST LIMITED: CPT

## 2025-01-06 PROCEDURE — 76642 ULTRASOUND BREAST LIMITED: CPT | Mod: 26,LT

## 2025-01-22 NOTE — CHIEF COMPLAINT
Pt transferred to room 318 on hospice. Family at bedside.    [Follow Up] : follow up GYN visit [FreeTextEntry1] : results

## 2025-03-04 NOTE — DISCHARGE NOTE ADULT - DISCHARGE TO
Patient called office to reschedule procedure originally scheduled for 03/17/2025 at 1320.     Procedure was rescheduled for 03/10/2025 at 1440, arrival time of 1410.     Updated patient instructions sent via [employer] at patient's request..     [facility] updated, case message sent, referral message sent with new D.O.S.     Patient had no further questions at time of call.                    Home

## (undated) DEVICE — SCOPE WARMER SEAL DISP

## (undated) DEVICE — ELCTR GROUNDING PAD ADULT COVIDIEN

## (undated) DEVICE — SHEARS HARMONIC HD 1000I 5MM X 36CM CURVED TIP

## (undated) DEVICE — POSITIONER PINK PAD PIGAZZI SYSTEM

## (undated) DEVICE — LIGASURE MARYLAND 37CM

## (undated) DEVICE — STAPLER SKIN MULTI DIRECTION W35

## (undated) DEVICE — TUBING HYDRO-SURG PLUS IRRIGATOR W SMOKEVAC & PROBE

## (undated) DEVICE — TROCAR COVIDIEN VERSAONE FIXATION CANNULA 5MM

## (undated) DEVICE — ENDOCATCH II 15MM

## (undated) DEVICE — FOLEY TRAY 16FR 5CC LF UMETER CLOSED

## (undated) DEVICE — TROCAR COVIDIEN VERSAONE BLADELESS FIXATION 5MM STANDARD

## (undated) DEVICE — SUT MONOCRYL 4-0 27" PS-2 UNDYED

## (undated) DEVICE — TROCAR COVIDIEN VERSAPORT BLADELESS OPTICAL 5MM STANDARD

## (undated) DEVICE — ELCTR BOVIE TIP BLADE INSULATED 6.5" EDGE

## (undated) DEVICE — Device

## (undated) DEVICE — DRSG TAPE ADHESIVE .25X36"

## (undated) DEVICE — WARMING BLANKET FULL ADULT

## (undated) DEVICE — BASIN SET SINGLE

## (undated) DEVICE — BLADE SURGICAL #15 CARBON

## (undated) DEVICE — D HELP - CLEARVIEW CLEARIFY SYSTEM

## (undated) DEVICE — PACK MAJOR ABDOMINAL WITH LAP

## (undated) DEVICE — LABELS BLANK W PEN

## (undated) DEVICE — DRSG TEGADERM 4X4.75"

## (undated) DEVICE — SUT PLAIN GUT 2-0 27" CT-1

## (undated) DEVICE — TUBING INSUFFLATION LAP FILTER 10FT

## (undated) DEVICE — DRAPE GENERAL ENDOSCOPY

## (undated) DEVICE — GOWN LG

## (undated) DEVICE — TRAP SPECIMEN SPUTUM 40CC

## (undated) DEVICE — TIP METZENBAUM SCISSOR MONOPOLAR ENDOCUT (ORANGE)

## (undated) DEVICE — PACK PERI GYN

## (undated) DEVICE — ENDOCATCH 10MM SPECIMEN POUCH

## (undated) DEVICE — VENODYNE/SCD SLEEVE CALF MEDIUM

## (undated) DEVICE — DRSG TEGADERM 2.5X3"

## (undated) DEVICE — POSITIONER STRAP ARMBOARD VELCRO TS-30

## (undated) DEVICE — SOL IRR BAG NS 0.9% 3000ML

## (undated) DEVICE — LIGASURE BLUNT TIP 37CM

## (undated) DEVICE — DRAPE LAPAROTOMY W VELCRO CORD TABS

## (undated) DEVICE — SUT VICRYL 0 18" CT-1 UNDYED (POP-OFF)

## (undated) DEVICE — PACK D&C

## (undated) DEVICE — SUT VICRYL 0 27" UR-6

## (undated) DEVICE — PREP BETADINE KIT

## (undated) DEVICE — APPLICATOR ENDOSCOPIC FOR SUGIFLO

## (undated) DEVICE — DRSG TELFA 3 X 8

## (undated) DEVICE — INSUFFLATION NDL COVIDIEN SURGINEEDLE VERESS 120MM

## (undated) DEVICE — DRSG CURITY GAUZE SPONGE 4 X 4" 12-PLY

## (undated) DEVICE — PACK GENERAL LAPAROSCOPY

## (undated) DEVICE — SUT VICRYL 0 18" TIES UNDYED

## (undated) DEVICE — ANESTHESIA CIRCUIT ADULT HMEF

## (undated) DEVICE — SUT VICRYL 0 27" CT-1 UNDYED

## (undated) DEVICE — TUBING OLYMPUS INSUFFLATION

## (undated) DEVICE — LIGASURE IMPACT

## (undated) DEVICE — DRSG STERISTRIPS 0.5 X 4"

## (undated) DEVICE — POSITIONER PURPLE ARM ONE STEP (LARGE)

## (undated) DEVICE — DRAPE LAPAROTOMY TRANSVERSE

## (undated) DEVICE — ELCTR BOVIE PENCIL SMOKE EVACUATION

## (undated) DEVICE — SYR LUER LOK 10CC

## (undated) DEVICE — CANISTER DISPOSABLE THIN WALL 3000CC